# Patient Record
Sex: FEMALE | Race: BLACK OR AFRICAN AMERICAN | NOT HISPANIC OR LATINO | URBAN - METROPOLITAN AREA
[De-identification: names, ages, dates, MRNs, and addresses within clinical notes are randomized per-mention and may not be internally consistent; named-entity substitution may affect disease eponyms.]

---

## 2024-04-23 ENCOUNTER — INPATIENT (INPATIENT)
Facility: HOSPITAL | Age: 89
LOS: 8 days | Discharge: HOSPICE | End: 2024-05-02
Attending: STUDENT IN AN ORGANIZED HEALTH CARE EDUCATION/TRAINING PROGRAM | Admitting: STUDENT IN AN ORGANIZED HEALTH CARE EDUCATION/TRAINING PROGRAM
Payer: MEDICARE

## 2024-04-23 VITALS
SYSTOLIC BLOOD PRESSURE: 156 MMHG | TEMPERATURE: 98 F | WEIGHT: 147.05 LBS | OXYGEN SATURATION: 97 % | HEART RATE: 76 BPM | RESPIRATION RATE: 20 BRPM | DIASTOLIC BLOOD PRESSURE: 77 MMHG

## 2024-04-23 DIAGNOSIS — C25.9 MALIGNANT NEOPLASM OF PANCREAS, UNSPECIFIED: ICD-10-CM

## 2024-04-23 LAB
ADD ON TEST-SPECIMEN IN LAB: SIGNIFICANT CHANGE UP
ADD ON TEST-SPECIMEN IN LAB: SIGNIFICANT CHANGE UP
ALBUMIN SERPL ELPH-MCNC: 3.5 G/DL — SIGNIFICANT CHANGE UP (ref 3.3–5)
ALP SERPL-CCNC: 363 U/L — HIGH (ref 40–120)
ALT FLD-CCNC: 32 U/L — SIGNIFICANT CHANGE UP (ref 4–33)
ANION GAP SERPL CALC-SCNC: 14 MMOL/L — SIGNIFICANT CHANGE UP (ref 7–14)
AST SERPL-CCNC: 54 U/L — HIGH (ref 4–32)
BASE EXCESS BLDV CALC-SCNC: 5.8 MMOL/L — HIGH (ref -2–3)
BASOPHILS # BLD AUTO: 0.06 K/UL — SIGNIFICANT CHANGE UP (ref 0–0.2)
BASOPHILS NFR BLD AUTO: 1.1 % — SIGNIFICANT CHANGE UP (ref 0–2)
BILIRUB SERPL-MCNC: 21.1 MG/DL — HIGH (ref 0.2–1.2)
BLOOD GAS VENOUS COMPREHENSIVE RESULT: SIGNIFICANT CHANGE UP
BUN SERPL-MCNC: 12 MG/DL — SIGNIFICANT CHANGE UP (ref 7–23)
CALCIUM SERPL-MCNC: 9.2 MG/DL — SIGNIFICANT CHANGE UP (ref 8.4–10.5)
CHLORIDE BLDV-SCNC: 92 MMOL/L — LOW (ref 96–108)
CHLORIDE SERPL-SCNC: 89 MMOL/L — LOW (ref 98–107)
CO2 BLDV-SCNC: 33.2 MMOL/L — HIGH (ref 22–26)
CO2 SERPL-SCNC: 26 MMOL/L — SIGNIFICANT CHANGE UP (ref 22–31)
CREAT SERPL-MCNC: 0.47 MG/DL — LOW (ref 0.5–1.3)
EGFR: 89 ML/MIN/1.73M2 — SIGNIFICANT CHANGE UP
EOSINOPHIL # BLD AUTO: 0.09 K/UL — SIGNIFICANT CHANGE UP (ref 0–0.5)
EOSINOPHIL NFR BLD AUTO: 1.6 % — SIGNIFICANT CHANGE UP (ref 0–6)
GAS PNL BLDV: 127 MMOL/L — LOW (ref 136–145)
GLUCOSE BLDV-MCNC: 191 MG/DL — HIGH (ref 70–99)
GLUCOSE SERPL-MCNC: 187 MG/DL — HIGH (ref 70–99)
HCO3 BLDV-SCNC: 32 MMOL/L — HIGH (ref 22–29)
HCT VFR BLD CALC: 27.9 % — LOW (ref 34.5–45)
HCT VFR BLDA CALC: 32 % — LOW (ref 34.5–46.5)
HGB BLD CALC-MCNC: 10.8 G/DL — LOW (ref 11.7–16.1)
HGB BLD-MCNC: 10.2 G/DL — LOW (ref 11.5–15.5)
IANC: 3.12 K/UL — SIGNIFICANT CHANGE UP (ref 1.8–7.4)
IMM GRANULOCYTES NFR BLD AUTO: 0.5 % — SIGNIFICANT CHANGE UP (ref 0–0.9)
LACTATE BLDV-MCNC: 0.8 MMOL/L — SIGNIFICANT CHANGE UP (ref 0.5–2)
LIDOCAIN IGE QN: 371 U/L — HIGH (ref 7–60)
LYMPHOCYTES # BLD AUTO: 1.67 K/UL — SIGNIFICANT CHANGE UP (ref 1–3.3)
LYMPHOCYTES # BLD AUTO: 30.1 % — SIGNIFICANT CHANGE UP (ref 13–44)
MCHC RBC-ENTMCNC: 29.1 PG — SIGNIFICANT CHANGE UP (ref 27–34)
MCHC RBC-ENTMCNC: 36.6 GM/DL — HIGH (ref 32–36)
MCV RBC AUTO: 79.5 FL — LOW (ref 80–100)
MONOCYTES # BLD AUTO: 0.58 K/UL — SIGNIFICANT CHANGE UP (ref 0–0.9)
MONOCYTES NFR BLD AUTO: 10.5 % — SIGNIFICANT CHANGE UP (ref 2–14)
NEUTROPHILS # BLD AUTO: 3.12 K/UL — SIGNIFICANT CHANGE UP (ref 1.8–7.4)
NEUTROPHILS NFR BLD AUTO: 56.2 % — SIGNIFICANT CHANGE UP (ref 43–77)
NRBC # BLD: 0 /100 WBCS — SIGNIFICANT CHANGE UP (ref 0–0)
NRBC # FLD: 0 K/UL — SIGNIFICANT CHANGE UP (ref 0–0)
PCO2 BLDV: 51 MMHG — SIGNIFICANT CHANGE UP (ref 39–52)
PH BLDV: 7.4 — SIGNIFICANT CHANGE UP (ref 7.32–7.43)
PLATELET # BLD AUTO: 275 K/UL — SIGNIFICANT CHANGE UP (ref 150–400)
PO2 BLDV: 24 MMHG — LOW (ref 25–45)
POTASSIUM BLDV-SCNC: 3.3 MMOL/L — LOW (ref 3.5–5.1)
POTASSIUM SERPL-MCNC: 3.2 MMOL/L — LOW (ref 3.5–5.3)
POTASSIUM SERPL-SCNC: 3.2 MMOL/L — LOW (ref 3.5–5.3)
PROT SERPL-MCNC: 7.7 G/DL — SIGNIFICANT CHANGE UP (ref 6–8.3)
RBC # BLD: 3.51 M/UL — LOW (ref 3.8–5.2)
RBC # FLD: 17.5 % — HIGH (ref 10.3–14.5)
SAO2 % BLDV: 27 % — LOW (ref 67–88)
SODIUM SERPL-SCNC: 129 MMOL/L — LOW (ref 135–145)
WBC # BLD: 5.55 K/UL — SIGNIFICANT CHANGE UP (ref 3.8–10.5)
WBC # FLD AUTO: 5.55 K/UL — SIGNIFICANT CHANGE UP (ref 3.8–10.5)

## 2024-04-23 PROCEDURE — 99498 ADVNCD CARE PLAN ADDL 30 MIN: CPT | Mod: 25

## 2024-04-23 PROCEDURE — 99223 1ST HOSP IP/OBS HIGH 75: CPT

## 2024-04-23 PROCEDURE — 99497 ADVNCD CARE PLAN 30 MIN: CPT | Mod: 25

## 2024-04-23 PROCEDURE — 99285 EMERGENCY DEPT VISIT HI MDM: CPT

## 2024-04-23 RX ORDER — LANOLIN ALCOHOL/MO/W.PET/CERES
3 CREAM (GRAM) TOPICAL AT BEDTIME
Refills: 0 | Status: DISCONTINUED | OUTPATIENT
Start: 2024-04-23 | End: 2024-05-02

## 2024-04-23 RX ORDER — DIPHENHYDRAMINE HCL 50 MG
25 CAPSULE ORAL ONCE
Refills: 0 | Status: COMPLETED | OUTPATIENT
Start: 2024-04-23 | End: 2024-04-23

## 2024-04-23 RX ORDER — POTASSIUM CHLORIDE 20 MEQ
40 PACKET (EA) ORAL ONCE
Refills: 0 | Status: COMPLETED | OUTPATIENT
Start: 2024-04-23 | End: 2024-04-23

## 2024-04-23 RX ORDER — SODIUM CHLORIDE 9 MG/ML
500 INJECTION INTRAMUSCULAR; INTRAVENOUS; SUBCUTANEOUS ONCE
Refills: 0 | Status: COMPLETED | OUTPATIENT
Start: 2024-04-23 | End: 2024-04-23

## 2024-04-23 RX ADMIN — SODIUM CHLORIDE 500 MILLILITER(S): 9 INJECTION INTRAMUSCULAR; INTRAVENOUS; SUBCUTANEOUS at 15:56

## 2024-04-23 RX ADMIN — Medication 40 MILLIEQUIVALENT(S): at 15:57

## 2024-04-23 RX ADMIN — Medication 25 MILLIGRAM(S): at 14:18

## 2024-04-23 NOTE — H&P ADULT - PROBLEM SELECTOR PLAN 5
K 3.2 on admission as well as mild hyponatremia on admission, likely iso poor PO intake given poor appetite and weight loss  - cont to trend electrolytes and replete PRN

## 2024-04-23 NOTE — ED PROVIDER NOTE - CLINICAL SUMMARY MEDICAL DECISION MAKING FREE TEXT BOX
[place]on, PGY3 -  89-year-old woman presenting with painless jaundice, abdomen soft and nontender, no nausea no vomiting no diarrhea.  No gallbladder surgeries or diagnoses in the past.  Consider possible pancreatic cancer versus obstructing mass versus hepatitis, labs, hepatitis panel, CT, reassess.   History and exam not consistent with cholecystitis versus choledocholithiasis versus pancreatitis. disposition pending based on workup results. *The above represents an initial assessment/impression. Please refer to progress notes for potential changes in patient clinical course*

## 2024-04-23 NOTE — H&P ADULT - PROBLEM SELECTOR PLAN 2
Presents with pruritis, jaundice, and elevated Bilirubin, Alk Phos, and AST, likely in setting of obstruction/compression by pancreatic mass  - CT imaging findings as above  - R factor 0.3, cholestatic  - No leukocytosis, no abdominal pain nor tenderness, no fevers/chills lately, no diarrhea nor signs/symptoms of possible colitis mentioned on CT  - sequelae of chronic cholecystitis likely iso above CT findings  - f/u hepatitis panel in ED  - f/u GI recs regarding possible stent

## 2024-04-23 NOTE — ED ADULT NURSE NOTE - OBJECTIVE STATEMENT
Pt received to room 23. Pt is a 89 year old female, A&Ox3, Hx of HTN, HLD. Pt presents to ED c/o jaundice. also endorses itchiness. abdomen soft, nontender, nondistended. denies chest pain, SOB, headache, dizziness, abdominal pain, n/v/d, urinary symptoms, fevers/chills, numbness/tingling. breathing is even and unlabored. VS as noted. skin is intact. right AC 20G IV placed, +blood return, flushes without difficulty. labs collected and sent. medications administered as ordered. awaiting imaging. stretcher set in lowest position, call bell within reach, safety maintained. Pt received to room 23. Pt is a 89 year old female, A&Ox3, Hx of HTN, HLD. Pt presents to ED c/o jaundice. also endorses itchiness. abdomen soft, nontender, nondistended. denies chest pain, SOB, headache, dizziness, abdominal pain, n/v/d, urinary symptoms, fevers/chills, numbness/tingling. breathing is even and unlabored. VS as noted. skin is jaundiced. right AC 20G IV placed, +blood return, flushes without difficulty. labs collected and sent. medications administered as ordered. awaiting imaging. stretcher set in lowest position, call bell within reach, safety maintained.

## 2024-04-23 NOTE — H&P ADULT - PROBLEM SELECTOR PLAN 8
DVT ppx: Lovenox  Diet: DASH/TLC DVT ppx: Lovenox  Diet: DASH/TLC, no coffee/fish  PT c/s    GOC: DNR/intubate per discussion overnight (unclear if time of night affected discussion). Was DNR/DNI per family members. Would benefit from re-addressing in the morning regarding code status and updating order and MOLST if changed.

## 2024-04-23 NOTE — H&P ADULT - HISTORY OF PRESENT ILLNESS
Patient is a 89 year-old female with history of HTN, HLD, and peripheral vascular disease presenting to the ED for elevated LFTs over the past 3 weeks with symptoms of painless jaundice and itchiness. Patient recently returned this morning from [facility] after spending around a year outside the country. Prior to her return, she went to a doctor due to general malaise, itchiness, and just overall generally "feeling sick". She was found to have elevated LFTs and bilirubin on those labs (unclear on exact values). She then also noticed jaundice as well as itchiness. Friend at bedside reports that patient appears to have lost weight compared to before. She otherwise denies fevers/chills, headaches, chest pain, difficulty breathing, abdominal pain, constipation/diarrhea, dysuria, recent sick contacts.     In the ED, patient vitals T 98.5F, HR 88, /91, and O2 saturation of 99% on RA with RR 18. Labs notable for elevated lipase, elevated Tbili, elevated Alk Phos and AST, and mild electrolyte abnormalities. CXR with clear lungs. CT abd/pelvis with main findings of biliary dilatation as well as pancreatic mass. Received Benadryl, potassium supplementation, and 500cc NS bolus in the ED.  Patient is a 89 year-old female with history of HTN, HLD, and peripheral vascular disease presenting to the ED for elevated LFTs over the past 3 weeks with symptoms of painless jaundice and itchiness. Patient recently returned this morning from [facility] after spending around a year outside the country. Prior to her return, she went to a doctor due to general malaise, itchiness, and just overall generally "feeling sick". She was found to have elevated LFTs and bilirubin on those labs (unclear on exact values). She then also noticed jaundice as well as itchiness. Friend at bedside reports that patient appears to have lost weight compared to before and patient states she just doesn't feel like eating as much. She otherwise denies fevers/chills, headaches, chest pain, difficulty breathing, abdominal pain, constipation/diarrhea, dysuria, recent sick contacts.     In the ED, patient vitals T 98.5F, HR 88, /91, and O2 saturation of 99% on RA with RR 18. Labs notable for elevated lipase, elevated Tbili, elevated Alk Phos and AST, and mild electrolyte abnormalities. CXR with clear lungs. CT abd/pelvis with main findings of biliary dilatation as well as pancreatic mass. Received Benadryl, potassium supplementation, and 500cc NS bolus in the ED.  Patient is a 89 year-old female with history of HTN, HLD, [place], and peripheral vascular disease presenting to the ED for elevated LFTs over the past 3 weeks with symptoms of painless jaundice and itchiness. Patient recently returned this morning from [facility] after spending around a year outside the country. Prior to her return, she went to a doctor due to general malaise, itchiness, and just overall generally "feeling sick". She was found to have elevated LFTs and bilirubin on those labs (unclear on exact values). She then also noticed jaundice as well as itchiness. Friend at bedside reports that patient appears to have lost weight compared to before and patient states she just doesn't feel like eating as much. She otherwise denies fevers/chills, headaches, chest pain, difficulty breathing, abdominal pain, constipation/diarrhea, dysuria, recent sick contacts.     In the ED, patient vitals T 98.5F, HR 88, /91, and O2 saturation of 99% on RA with RR 18. Labs notable for elevated lipase, elevated Tbili, elevated Alk Phos and AST, and mild electrolyte abnormalities. CXR with clear lungs. CT abd/pelvis with main findings of biliary dilatation as well as pancreatic mass. Received Benadryl, potassium supplementation, and 500cc NS bolus in the ED.

## 2024-04-23 NOTE — ED ADULT TRIAGE NOTE - CHIEF COMPLAINT QUOTE
Patient recently traveled from [facility] last night and was told her liver enzymes were elevated, presents to ED with complains of jaundice of eyes and generalized itchiness. Patient denies any CP or SOB, patient able to speak in clear sentences, respirations equal and unlabored on room air.  phx: HTN, HLD

## 2024-04-23 NOTE — H&P ADULT - PROBLEM SELECTOR PLAN 1
Presents with generalized malaise, weight loss, jaundice and itchiness. Found to have pancreatic  mass on CT imaging with concern for adenocarcinoma  - CT imaging with 1.4 cm mass within uncinate process of pancreas w/ marked pancreatic and biliary ductal dilatation, suspicious for adenocarcinoma. Abuts portosplenic confluence but otherwise no vascular involvement identified. Further evaluation with endoscopic US is recommended. No evidence of metastatic disease  - also mass-like nonspecific fullness of pancreatic head  - patient denies pulmonary symptoms regarding chest involvement  - lipase elevated 371 however patient without significant pain currently. Although CT does show pancreatic nonspecific findings, will be conservative with fluids in setting of extensively calcified aortic valve as well as systolic murmur on exam and patient's lack of symptoms. Unclear if representing pancreatitis/mass on CT.  - GOC initiated with patient and family, amenable to symptom-relief and possible biopsy, family to discuss further in AM  - GI emailed for EUS and possible biopsy, f/u recs Presents with generalized malaise, weight loss, jaundice and itchiness. Found to have pancreatic  mass on CT imaging with concern for adenocarcinoma  - CT imaging with 1.4 cm mass within uncinate process of pancreas w/ marked pancreatic and biliary ductal dilatation, suspicious for adenocarcinoma. Abuts portosplenic confluence but otherwise no vascular involvement identified. Further evaluation with endoscopic US is recommended. No evidence of metastatic disease  - also mass-like nonspecific fullness of pancreatic head  - patient denies pulmonary symptoms regarding chest involvement  - lipase elevated 371 however patient without significant pain currently. Although CT does show pancreatic nonspecific findings, will be conservative with fluids in setting of extensively calcified aortic valve as well as systolic murmur on exam and patient's current lack of abdominal symptoms. Unclear if representing pancreatitis/mass on CT.  - GOC initiated with patient and family, amenable to symptom-relief and possible biopsy, family to discuss further in AM  - GI emailed for EUS and possible biopsy, f/u recs

## 2024-04-23 NOTE — H&P ADULT - NSHPPHYSICALEXAM_GEN_ALL_CORE
VITALS:   T(C): 36.6 (04-24-24 @ 01:35), Max: 36.9 (04-23-24 @ 13:50)  HR: 61 (04-24-24 @ 01:35) (59 - 88)  BP: 148/68 (04-24-24 @ 01:35) (129/73 - 171/85)  RR: 17 (04-24-24 @ 01:35) (17 - 20)  SpO2: 100% (04-24-24 @ 01:35) (97% - 100%)    GENERAL: no acute distress, frail  EYES: EOMI, PERRLA, +scleral icterus  ENMT: +dry oral mucosa  NECK: supple, no palpable masses  RESPIRATORY: lungs clear to ascultation b/l, unlabored respirations  CARDIOVASCULAR: regular rate and rhythm, +systolic murmur  ABDOMEN: soft, normal bowel sounds, nondistended, nontender to palpation  EXTREMITIES: no joint swelling or tenderness to palpation  PSYCH: affect appropriate  NEUROLOGY: AAOx4 ("[place]", "hospital", "2024", "itching and liver tests"), CNs grossly intact  SKIN: no palpable lesions

## 2024-04-23 NOTE — H&P ADULT - PROBLEM SELECTOR PLAN 3
Likely iso cholestasis given above. No rashes or lesions visualized.   - can cont with antihistamine to assess if improvement  - trial Cholestyramine BID

## 2024-04-23 NOTE — ED ADULT NURSE NOTE - NSFALLHARMRISKINTERV_ED_ALL_ED

## 2024-04-23 NOTE — H&P ADULT - PROBLEM SELECTOR PLAN 4
UA with WBC and many bacteria. Endorses burning with urination  - CT abd/pelvis with findings of mild thickening of the bladder base with prominence of the proximal urethra  - Ceftriaxone for simple cystitis  - f/u UCx, tailor antibiotics

## 2024-04-23 NOTE — ED PROVIDER NOTE - ATTENDING CONTRIBUTION TO CARE
DR. FOSS, ATTENDING MD-  I performed a face to face bedside interview with the patient regarding history of present illness, review of symptoms and past medical history. I completed an independent physical exam.  I have discussed the patient's plan of care with the resident.   Documentation as above in the note.    89 y/o female with jaundice, pruritis over past three weeks.  Feels generally fatigued.  No abd pain.  Concern for intra-abd mass with resultant cholestasis.  Obtain cbc cmp lipase ct a/p give ivf bolus antihistamine likely admission.

## 2024-04-23 NOTE — H&P ADULT - NSHPREVIEWOFSYSTEMS_GEN_ALL_CORE
REVIEW OF SYSTEMS:    CONSTITUTIONAL: No fevers. No chills. +Malaise, +Weight loss, +Pruritis.  HEENT: No change in vision. No change in hearing. No sore throat.  CARDIOVASCULAR: No chest pain. No palpitations  RESPIRATORY: No shortness of breath. No cough.  GASTROINTESTINAL: No nausea. No vomiting. No abdominal pain. No change in bowel movements.  GENITOURINARY:  No hematuria. No dysuria. No change in urination.   EXTREMITIES/SKIN: No rashes. No itching. No swelling.  MUSCULOSKELETAL: No muscle aches. No stiffness.  PSYCHIATRIC: No history of depression or anxiety.  NEUROLOGICAL: No headaches, dizziness. No change in bowel or bladder control.  ENDOCRINOLOGIC: No reports of cold or heat intolerance. No polydipsia.  ALLERGIES: +Allergy to fish and coffee (added to system)

## 2024-04-23 NOTE — ED PROVIDER NOTE - NS ED ROS FT
GENERAL: No fever, no chills  EYES: No change in vision  HEENT: No trouble swallowing or speaking  CARDIAC: No chest pain  PULMONARY: No cough, no SOB  GI: No abdominal pain, no nausea, no vomiting, no diarrhea, no constipation  : No changes in urination  SKIN: No rashes, +jaundice   NEURO: No headache, no numbness  MSK: No joint pain  Otherwise as HPI or negative.

## 2024-04-23 NOTE — ED PROVIDER NOTE - OBJECTIVE STATEMENT
89-year-old woman with PMH HTN, HLD, presenting due to elevated LFTs over the last 3 weeks, painless jaundice and itchiness.  [place] and was unable to continue follow-up with the primary care doctor that initially told her about her elevated LFTs.  Denies fevers, chills, nausea, vomiting, chest pain, SOB, abdominal pain, diarrhea.  Denies history of hepatitis or gallbladder issues.  Only surgical history is appendicitis and fibroid removals.

## 2024-04-23 NOTE — H&P ADULT - NSHPLABSRESULTS_GEN_ALL_CORE
LABS:                          10.2   5.55  )-----------( 275      ( 2024 14:16 )             27.9         129<L>  |  89<L>  |  12  ----------------------------<  187<H>  3.2<L>   |  26  |  0.47<L>    Ca    9.2      2024 14:16  Mg     1.70         TPro  7.7  /  Alb  3.5  /  TBili  21.1<H>  /  DBili  16.0<H>  /  AST  54<H>  /  ALT  32  /  AlkPhos  363<H>      LIVER FUNCTIONS - ( 2024 14:16 )  Alb: 3.5 g/dL / Pro: 7.7 g/dL / ALK PHOS: 363 U/L / ALT: 32 U/L / AST: 54 U/L / GGT: x             Urinalysis Basic - ( 2024 01:03 )    Color: Dark Yellow / Appearance: Cloudy / S.023 / pH: x  Gluc: x / Ketone: Negative mg/dL  / Bili: Moderate / Urobili: 0.2 mg/dL   Blood: x / Protein: Negative mg/dL / Nitrite: Negative   Leuk Esterase: Small / RBC: 6 /HPF / WBC 14 /HPF   Sq Epi: x / Non Sq Epi: 0 /HPF / Bacteria: Many /HPF    Lipase 371    VBG pH 7.40 / pCO2 51 / pO2 24 / HCO3 32 / 27.0%  Lactate 0.8      IMAGING:      < from: Xray Chest 1 View- PORTABLE-Urgent (24 @ 14:52) >  FINDINGS:  The heart is not accurately assessed in this AP projection.  The lungs are clear.  There is no pleural effusion.  There is no pneumothorax.  No acute bony abnormality.  IMPRESSION:  Clear lungs.  --- End of Report ---      < from: CT Abdomen and Pelvis w/ IV Cont (24 @ 17:36) >    IMPRESSION:  There is a very subtle approximately 1.4 cm mass within the uncinate   process of the pancreas with marked pancreatic and biliary ductal   dilatation, suspicious for adenocarcinoma. There is approximately 90   degrees abutment of the portosplenic confluence. Otherwise no significant   vascular involvement identified. Further evaluation with endoscopic   ultrasound is recommended.    There is also masslike fullness of the pancreatic head, which is   nonspecific and may simply represent normal residual pancreatic   parenchyma, there also appears to be focal wall thickening of the   adjacent duodenum. This could also be assessed with upper   endoscopy/endoscopic ultrasound recommended above.    No evidence of metastatic disease within the abdomen or pelvis.    Cholelithiasis.    There is extensive diverticulosis of the entirety of the large bowel and   a long segment of wall thickening of the right hemicolon, which could be   due to underdistention or an underlying colitis in the appropriate   clinical setting.    Extensive aortic valve leaflet calcifications are noted. Correlation for   aortic stenosis is suggested.    --- End of Report ---  < end of copied text >

## 2024-04-23 NOTE — H&P ADULT - CONVERSATION DETAILS
Discussed with patient and friend, Galina Waterman, at bedside regarding diagnosis and treatment options. Patient has capacity and understanding for findings and decisions. Explained findings regarding pancreatic mass and biliary ductal dilatation likely due to compression/obstruction from the mass and proposed next steps of EUS with possible biopsy or stent placement or both. Also discussed the risks of treatment regarding surgery or chemotherapy at her age and whether patient would prefer diagnosis and treatment or more of a palliative approach. Patient is not entirely sure on her decision at this stage, but believes she would want at least a biopsy and symptom relief.    Asked patient regarding resuscitation and code status. Patient replies that she is currently the longest-living person in her family and has had a good life. She jokes that "I'm 89. If my heart stopped, what would you be bringing back?" She has discussed this with her family before and would not resuscitation with chest compressions or CPR. However, when asked about intubation, she replies that intubation would be okay. When asked about proxies, she states that her son is her proxy and her niece is secondary decision-maker.    Called son, Ganesh, to update and confirm. Son confirms that he is the healthcare proxy and also states that DNR/DNI would be consistent with her past discussions. Expressed for son to find and bring in his healthcare proxy form.     Also called patient's niece at request to update and confirm. Niece confirms that patient would likely want aggressive interventions, and is not sure if patient would even want a biopsy. Both niece and son to visit tomorrow morning and will discuss with patient.     MOLST form filled out for tonight, will need updating tomorrow after HCP and further discussion. Discussed with patient and friend, Galina Waterman, at bedside regarding diagnosis and treatment options. Patient has capacity and understanding for findings and decisions. Explained findings regarding pancreatic mass and biliary ductal dilatation likely due to compression/obstruction from the mass and proposed next steps of EUS with possible biopsy or stent placement or both. Also discussed the risks of treatment regarding surgery or chemotherapy at her age and whether patient would prefer diagnosis and treatment or more of a palliative approach. Patient is not entirely sure on her decision at this stage, but believes she would want at least a biopsy and symptom relief.    Asked patient regarding resuscitation and code status. Patient replies that she is currently the longest-living person in her family and has had a good life. She jokes that "I'm 89. If my heart stopped, what would you be bringing back?" She has discussed this with her family before and would not resuscitation with chest compressions or CPR. However, when asked about intubation, she replies that intubation would be okay. When asked about proxies, she states that her son is her proxy and her niece is secondary decision-maker.    Called son, Ganesh, to update and confirm. Son confirms that he is the healthcare proxy and also states that DNR/DNI would be consistent with her past discussions. Expressed for son to find and bring in his healthcare proxy form.     Also called patient's niece at request to update and confirm. Niece confirms that patient would likely not want aggressive interventions such as surgery. Both niece and son to visit tomorrow morning and will discuss with patient.     MOLST form filled out for tonight, will need updating tomorrow after HCP and further discussion. Discussed with patient and friend, Galina Waterman, at bedside regarding diagnosis and treatment options. Patient has capacity and understanding for findings and decisions. Explained findings regarding pancreatic mass and biliary ductal dilatation likely due to compression/obstruction from the mass and proposed next steps of EUS with possible biopsy or stent placement or both. Also discussed the risks of treatment regarding surgery or chemotherapy at her age and whether patient would prefer diagnosis and treatment or more of a palliative approach. Patient is not entirely sure on her decision at this stage, but believes she would want at least a biopsy and symptom relief.    Asked patient regarding resuscitation and code status. Patient replies that she is currently the longest-living person in her family and has had a good life. She jokes that "I'm 89. If my heart stopped, what would you be bringing back?" She has discussed this with her family before and would not resuscitation with chest compressions or CPR. However, when asked about intubation, she replies that intubation would be okay. When asked about proxies, she states that her son is her proxy and her niece is secondary decision-maker.    Called son, Ganesh, to update and confirm. Son confirms that he is the healthcare proxy and also states that DNR/DNI would be consistent with her past discussions. Expressed for son to find and bring in his healthcare proxy form.     Also called patient's niece at request to update and confirm. Niece confirms that patient would likely not want aggressive interventions such as surgery. Both niece and son to visit tomorrow morning and will discuss with patient.     MOLST form filled out for tonight, will possibly need updating tomorrow after HCP and further discussion. Discussed with patient and friend, Galina Waterman, at bedside regarding diagnosis and treatment options. Patient has capacity and understanding for findings and decisions. Explained findings regarding pancreatic mass and biliary ductal dilatation likely due to compression/obstruction from the mass and next steps of likely EUS with possible biopsy or stent placement or both. Also discussed the risks of further treatment regarding surgery or chemotherapy at her age and whether patient would prefer diagnosis and treatment or more of a palliative approach. Patient is not entirely sure on her decision at this stage, but believes she would want at least a biopsy and symptom relief.    Asked patient regarding resuscitation and code status. Patient replies that she is currently the longest-living person in her family and has had a good life. She jokes that "I'm 89. If my heart stopped, what would you be bringing back?" She has discussed this with her family before and would not resuscitation with chest compressions or CPR. However, when asked about intubation, she replies that intubation would be okay. When asked about proxies, she states that her son is her proxy and her niece is secondary decision-maker.    Called son, Ganesh, to update and confirm. Son confirms that he is the healthcare proxy and also states that DNR/DNI would be consistent with her past discussions. Expressed for son to find and bring in his healthcare proxy form.     Also called patient's niece at pt's request to update and confirm. Niece confirms that patient would likely not want aggressive interventions such as surgery. Both niece and son to visit tomorrow morning and will discuss with patient.     MOLST form filled out for tonight, will possibly need updating tomorrow after HCP arrives and further discussion.

## 2024-04-23 NOTE — H&P ADULT - ASSESSMENT
89F with PMH of HTN, HLD, and peripheral vascular disease presenting to the ED for elevated LFTs over the past 3 weeks with symptoms of painless jaundice and itchiness, found to have elevated Bilirubin and Alk Phos with findings of CBD dilatation iso pancreatic mass 89F with PMH of HTN, HLD, and peripheral vascular disease presenting to the ED for elevated LFTs over the past 3 weeks with symptoms of painless jaundice and itchiness, found to have elevated Bilirubin and Alk Phos with CT findings of CBD dilatation iso pancreatic mass, admitted for further work-up and management 89F with PMH of HTN, HLD, [place], and peripheral vascular disease presenting to the ED for elevated LFTs over the past 3 weeks with symptoms of painless jaundice and itchiness, found to have elevated Bilirubin and Alk Phos with CT findings of CBD dilatation iso pancreatic mass, admitted for further work-up and management

## 2024-04-23 NOTE — H&P ADULT - NSICDXPASTSURGICALHX_GEN_ALL_CORE_FT
SNF Follow-up Note    Skilled Nursing Facility Discharge Assessment 6/13/2019   Acute Facility Discharged From [facility]   Acute Discharge Date 5/29/2019   Name of the Skilled [facility] Facility? [facility]   Estimated length of stay for the patient? No d/c date set at this time   Who is the insurance provider or payor of patient stay? Medicare   Progression of Patient? Medicare status but potential for LTC placement.       Dania Hughes RN    6/13/2019, 3:20 PM   PAST SURGICAL HISTORY:  History of myomectomy

## 2024-04-23 NOTE — H&P ADULT - NSICDXPASTMEDICALHX_GEN_ALL_CORE_FT
PAST MEDICAL HISTORY:  HLD (hyperlipidemia)     HTN (hypertension)     Peripheral vascular disease

## 2024-04-23 NOTE — ED ADULT NURSE NOTE - CHIEF COMPLAINT QUOTE
Patient recently traveled from [facility] last night and was told her liver enzymes were elevated, presents to ED with complains of jaundice of eyes and generalized itchiness. Patient denies any CP or SOB, patient able to speak in clear sentences, respirations equal and unlabored on room air.  phx: HTN, HLD

## 2024-04-23 NOTE — ED ADULT NURSE REASSESSMENT NOTE - NS ED NURSE REASSESS COMMENT FT1
Report received from break RENARD Lezama. Pt is A&Ox3, appears comfortable in stretcher, offers no new complaints at this time. breathing is even and unlabored. Denies pain or SOB. NSR on cardiac monitor. Plan of care ongoing. Stretcher set in lowest position, call bell within reach, safety maintained.

## 2024-04-23 NOTE — ED ADULT NURSE NOTE - SUICIDE SCREENING DEPRESSION
Negative Aklief Pregnancy And Lactation Text: It is unknown if this medication is safe to use during pregnancy.  It is unknown if this medication is excreted in breast milk.  Breastfeeding women should use the topical cream on the smallest area of the skin for the shortest time needed while breastfeeding.  Do not apply to nipple and areola.

## 2024-04-23 NOTE — ED PROVIDER NOTE - PROGRESS NOTE DETAILS
Isaiah PGY1: received patient at signout.  painless jaundice for 3 weeks; bilib>20 here, hypokalemia repleted; ct abd/pel with pancreatic mass concerning for adenocarcinoma. Isaiah PGY1: patient and family updated on findings of pancreatic mass concerning for adenocarcinoma.  discussed plans for admission for further evaluation and management.  patient and family understand and agree.

## 2024-04-24 DIAGNOSIS — Z98.890 OTHER SPECIFIED POSTPROCEDURAL STATES: Chronic | ICD-10-CM

## 2024-04-24 DIAGNOSIS — L29.9 PRURITUS, UNSPECIFIED: ICD-10-CM

## 2024-04-24 DIAGNOSIS — I10 ESSENTIAL (PRIMARY) HYPERTENSION: ICD-10-CM

## 2024-04-24 DIAGNOSIS — Z29.9 ENCOUNTER FOR PROPHYLACTIC MEASURES, UNSPECIFIED: ICD-10-CM

## 2024-04-24 DIAGNOSIS — E87.6 HYPOKALEMIA: ICD-10-CM

## 2024-04-24 DIAGNOSIS — N39.0 URINARY TRACT INFECTION, SITE NOT SPECIFIED: ICD-10-CM

## 2024-04-24 DIAGNOSIS — I35.0 NONRHEUMATIC AORTIC (VALVE) STENOSIS: ICD-10-CM

## 2024-04-24 DIAGNOSIS — K86.89 OTHER SPECIFIED DISEASES OF PANCREAS: ICD-10-CM

## 2024-04-24 DIAGNOSIS — Z51.5 ENCOUNTER FOR PALLIATIVE CARE: ICD-10-CM

## 2024-04-24 DIAGNOSIS — Z71.89 OTHER SPECIFIED COUNSELING: ICD-10-CM

## 2024-04-24 DIAGNOSIS — K83.1 OBSTRUCTION OF BILE DUCT: ICD-10-CM

## 2024-04-24 LAB
ADD ON TEST-SPECIMEN IN LAB: SIGNIFICANT CHANGE UP
ADD ON TEST-SPECIMEN IN LAB: SIGNIFICANT CHANGE UP
ALBUMIN SERPL ELPH-MCNC: 3.3 G/DL — SIGNIFICANT CHANGE UP (ref 3.3–5)
ALP SERPL-CCNC: 338 U/L — HIGH (ref 40–120)
ALT FLD-CCNC: 29 U/L — SIGNIFICANT CHANGE UP (ref 4–33)
ANION GAP SERPL CALC-SCNC: 14 MMOL/L — SIGNIFICANT CHANGE UP (ref 7–14)
APPEARANCE UR: ABNORMAL
APTT BLD: 34.5 SEC — SIGNIFICANT CHANGE UP (ref 24.5–35.6)
AST SERPL-CCNC: 53 U/L — HIGH (ref 4–32)
BACTERIA # UR AUTO: ABNORMAL /HPF
BASOPHILS # BLD AUTO: 0.06 K/UL — SIGNIFICANT CHANGE UP (ref 0–0.2)
BASOPHILS NFR BLD AUTO: 1 % — SIGNIFICANT CHANGE UP (ref 0–2)
BILIRUB SERPL-MCNC: 19.3 MG/DL — HIGH (ref 0.2–1.2)
BILIRUB UR-MCNC: ABNORMAL
BUN SERPL-MCNC: 9 MG/DL — SIGNIFICANT CHANGE UP (ref 7–23)
CALCIUM SERPL-MCNC: 8.9 MG/DL — SIGNIFICANT CHANGE UP (ref 8.4–10.5)
CANCER AG19-9 SERPL-ACNC: 217 U/ML — HIGH
CAST: 0 /LPF — SIGNIFICANT CHANGE UP (ref 0–4)
CHLORIDE SERPL-SCNC: 93 MMOL/L — LOW (ref 98–107)
CO2 SERPL-SCNC: 20 MMOL/L — LOW (ref 22–31)
COLOR SPEC: SIGNIFICANT CHANGE UP
CREAT SERPL-MCNC: 0.35 MG/DL — LOW (ref 0.5–1.3)
DIFF PNL FLD: ABNORMAL
EGFR: 96 ML/MIN/1.73M2 — SIGNIFICANT CHANGE UP
EOSINOPHIL # BLD AUTO: 0.11 K/UL — SIGNIFICANT CHANGE UP (ref 0–0.5)
EOSINOPHIL NFR BLD AUTO: 1.8 % — SIGNIFICANT CHANGE UP (ref 0–6)
GLUCOSE SERPL-MCNC: 110 MG/DL — HIGH (ref 70–99)
GLUCOSE UR QL: NEGATIVE MG/DL — SIGNIFICANT CHANGE UP
HAV IGM SER-ACNC: SIGNIFICANT CHANGE UP
HBV CORE IGM SER-ACNC: SIGNIFICANT CHANGE UP
HBV SURFACE AG SER-ACNC: SIGNIFICANT CHANGE UP
HCT VFR BLD CALC: 28.1 % — LOW (ref 34.5–45)
HCV AB S/CO SERPL IA: 0.14 S/CO — SIGNIFICANT CHANGE UP (ref 0–0.99)
HCV AB SERPL-IMP: SIGNIFICANT CHANGE UP
HGB BLD-MCNC: 10.4 G/DL — LOW (ref 11.5–15.5)
IANC: 3.82 K/UL — SIGNIFICANT CHANGE UP (ref 1.8–7.4)
IMM GRANULOCYTES NFR BLD AUTO: 0.3 % — SIGNIFICANT CHANGE UP (ref 0–0.9)
INR BLD: 1.33 RATIO — HIGH (ref 0.85–1.18)
KETONES UR-MCNC: NEGATIVE MG/DL — SIGNIFICANT CHANGE UP
LEUKOCYTE ESTERASE UR-ACNC: ABNORMAL
LYMPHOCYTES # BLD AUTO: 1.66 K/UL — SIGNIFICANT CHANGE UP (ref 1–3.3)
LYMPHOCYTES # BLD AUTO: 26.8 % — SIGNIFICANT CHANGE UP (ref 13–44)
MAGNESIUM SERPL-MCNC: 1.7 MG/DL — SIGNIFICANT CHANGE UP (ref 1.6–2.6)
MCHC RBC-ENTMCNC: 29.8 PG — SIGNIFICANT CHANGE UP (ref 27–34)
MCHC RBC-ENTMCNC: 37 GM/DL — HIGH (ref 32–36)
MCV RBC AUTO: 80.5 FL — SIGNIFICANT CHANGE UP (ref 80–100)
MONOCYTES # BLD AUTO: 0.53 K/UL — SIGNIFICANT CHANGE UP (ref 0–0.9)
MONOCYTES NFR BLD AUTO: 8.5 % — SIGNIFICANT CHANGE UP (ref 2–14)
MRSA PCR RESULT.: SIGNIFICANT CHANGE UP
NEUTROPHILS # BLD AUTO: 3.82 K/UL — SIGNIFICANT CHANGE UP (ref 1.8–7.4)
NEUTROPHILS NFR BLD AUTO: 61.6 % — SIGNIFICANT CHANGE UP (ref 43–77)
NITRITE UR-MCNC: NEGATIVE — SIGNIFICANT CHANGE UP
NRBC # BLD: 0 /100 WBCS — SIGNIFICANT CHANGE UP (ref 0–0)
NRBC # FLD: 0 K/UL — SIGNIFICANT CHANGE UP (ref 0–0)
OSMOLALITY SERPL: 272 MOSM/KG — LOW (ref 275–295)
OSMOLALITY UR: 340 MOSM/KG — SIGNIFICANT CHANGE UP (ref 50–1200)
PH UR: 8 — SIGNIFICANT CHANGE UP (ref 5–8)
PHOSPHATE SERPL-MCNC: 2.8 MG/DL — SIGNIFICANT CHANGE UP (ref 2.5–4.5)
PLATELET # BLD AUTO: 297 K/UL — SIGNIFICANT CHANGE UP (ref 150–400)
POTASSIUM SERPL-MCNC: 3.4 MMOL/L — LOW (ref 3.5–5.3)
POTASSIUM SERPL-SCNC: 3.4 MMOL/L — LOW (ref 3.5–5.3)
PROT SERPL-MCNC: 7.3 G/DL — SIGNIFICANT CHANGE UP (ref 6–8.3)
PROT UR-MCNC: NEGATIVE MG/DL — SIGNIFICANT CHANGE UP
PROTHROM AB SERPL-ACNC: 14.9 SEC — HIGH (ref 9.5–13)
RBC # BLD: 3.49 M/UL — LOW (ref 3.8–5.2)
RBC # FLD: 17.7 % — HIGH (ref 10.3–14.5)
RBC CASTS # UR COMP ASSIST: 6 /HPF — HIGH (ref 0–4)
S AUREUS DNA NOSE QL NAA+PROBE: SIGNIFICANT CHANGE UP
SODIUM SERPL-SCNC: 127 MMOL/L — LOW (ref 135–145)
SP GR SPEC: 1.02 — SIGNIFICANT CHANGE UP (ref 1–1.03)
SQUAMOUS # UR AUTO: 0 /HPF — SIGNIFICANT CHANGE UP (ref 0–5)
UROBILINOGEN FLD QL: 0.2 MG/DL — SIGNIFICANT CHANGE UP (ref 0.2–1)
WBC # BLD: 6.2 K/UL — SIGNIFICANT CHANGE UP (ref 3.8–10.5)
WBC # FLD AUTO: 6.2 K/UL — SIGNIFICANT CHANGE UP (ref 3.8–10.5)
WBC UR QL: 14 /HPF — HIGH (ref 0–5)

## 2024-04-24 PROCEDURE — 71275 CT ANGIOGRAPHY CHEST: CPT | Mod: 26

## 2024-04-24 PROCEDURE — 99497 ADVNCD CARE PLAN 30 MIN: CPT | Mod: 25

## 2024-04-24 PROCEDURE — 99223 1ST HOSP IP/OBS HIGH 75: CPT | Mod: GC

## 2024-04-24 PROCEDURE — 99232 SBSQ HOSP IP/OBS MODERATE 35: CPT | Mod: GC

## 2024-04-24 PROCEDURE — 99223 1ST HOSP IP/OBS HIGH 75: CPT

## 2024-04-24 RX ORDER — POTASSIUM CHLORIDE 20 MEQ
40 PACKET (EA) ORAL ONCE
Refills: 0 | Status: COMPLETED | OUTPATIENT
Start: 2024-04-24 | End: 2024-04-24

## 2024-04-24 RX ORDER — CEFTRIAXONE 500 MG/1
1000 INJECTION, POWDER, FOR SOLUTION INTRAMUSCULAR; INTRAVENOUS EVERY 24 HOURS
Refills: 0 | Status: COMPLETED | OUTPATIENT
Start: 2024-04-24 | End: 2024-04-26

## 2024-04-24 RX ORDER — ACETAMINOPHEN 500 MG
650 TABLET ORAL ONCE
Refills: 0 | Status: COMPLETED | OUTPATIENT
Start: 2024-04-24 | End: 2024-04-24

## 2024-04-24 RX ORDER — CHOLESTYRAMINE 4 G/9G
4 POWDER, FOR SUSPENSION ORAL
Refills: 0 | Status: DISCONTINUED | OUTPATIENT
Start: 2024-04-24 | End: 2024-05-02

## 2024-04-24 RX ORDER — VALSARTAN 80 MG/1
160 TABLET ORAL DAILY
Refills: 0 | Status: DISCONTINUED | OUTPATIENT
Start: 2024-04-24 | End: 2024-04-26

## 2024-04-24 RX ORDER — SODIUM CHLORIDE 9 MG/ML
500 INJECTION INTRAMUSCULAR; INTRAVENOUS; SUBCUTANEOUS
Refills: 0 | Status: DISCONTINUED | OUTPATIENT
Start: 2024-04-24 | End: 2024-04-26

## 2024-04-24 RX ORDER — CHLORHEXIDINE GLUCONATE 213 G/1000ML
1 SOLUTION TOPICAL
Refills: 0 | Status: DISCONTINUED | OUTPATIENT
Start: 2024-04-24 | End: 2024-05-02

## 2024-04-24 RX ORDER — DIPHENHYDRAMINE HCL 50 MG
25 CAPSULE ORAL EVERY 6 HOURS
Refills: 0 | Status: DISCONTINUED | OUTPATIENT
Start: 2024-04-24 | End: 2024-04-24

## 2024-04-24 RX ORDER — CALAMINE AND ZINC OXIDE AND PHENOL 160; 10 MG/ML; MG/ML
1 LOTION TOPICAL DAILY
Refills: 0 | Status: DISCONTINUED | OUTPATIENT
Start: 2024-04-24 | End: 2024-05-02

## 2024-04-24 RX ORDER — ENOXAPARIN SODIUM 100 MG/ML
40 INJECTION SUBCUTANEOUS EVERY 24 HOURS
Refills: 0 | Status: DISCONTINUED | OUTPATIENT
Start: 2024-04-24 | End: 2024-04-26

## 2024-04-24 RX ADMIN — ENOXAPARIN SODIUM 40 MILLIGRAM(S): 100 INJECTION SUBCUTANEOUS at 06:27

## 2024-04-24 RX ADMIN — SODIUM CHLORIDE 75 MILLILITER(S): 9 INJECTION INTRAMUSCULAR; INTRAVENOUS; SUBCUTANEOUS at 04:10

## 2024-04-24 RX ADMIN — CALAMINE AND ZINC OXIDE AND PHENOL 1 APPLICATION(S): 160; 10 LOTION TOPICAL at 11:57

## 2024-04-24 RX ADMIN — CEFTRIAXONE 100 MILLIGRAM(S): 500 INJECTION, POWDER, FOR SOLUTION INTRAMUSCULAR; INTRAVENOUS at 06:41

## 2024-04-24 RX ADMIN — Medication 40 MILLIEQUIVALENT(S): at 10:28

## 2024-04-24 RX ADMIN — VALSARTAN 160 MILLIGRAM(S): 80 TABLET ORAL at 06:26

## 2024-04-24 RX ADMIN — CHOLESTYRAMINE 4 GRAM(S): 4 POWDER, FOR SUSPENSION ORAL at 11:55

## 2024-04-24 RX ADMIN — Medication 650 MILLIGRAM(S): at 06:56

## 2024-04-24 RX ADMIN — CHLORHEXIDINE GLUCONATE 1 APPLICATION(S): 213 SOLUTION TOPICAL at 13:55

## 2024-04-24 RX ADMIN — Medication 650 MILLIGRAM(S): at 06:26

## 2024-04-24 NOTE — PATIENT PROFILE ADULT - ..
24-Apr-2024 02:00:56 Azithromycin Pregnancy And Lactation Text: This medication is considered safe during pregnancy and is also secreted in breast milk.

## 2024-04-24 NOTE — CONSULT NOTE ADULT - SUBJECTIVE AND OBJECTIVE BOX
CC: Patient is a 89y old  Female who presents with a chief complaint of Pancreatic mass, abnormal lab-work (24 Apr 2024 10:20)    HPI:  Patient is a 89 year-old female with history of HTN, HLD, [place], and peripheral vascular disease presenting to the ED for elevated LFTs over the past 3 weeks with symptoms of painless jaundice and itchiness. Patient recently returned this morning from [facility] after spending around a year outside the country. Prior to her return, she went to a doctor due to general malaise, itchiness, and just overall generally "feeling sick". She was found to have elevated LFTs and bilirubin on those labs (unclear on exact values). She then also noticed jaundice as well as itchiness. Friend at bedside reports that patient appears to have lost weight compared to before and patient states she just doesn't feel like eating as much. She otherwise denies fevers/chills, headaches, chest pain, difficulty breathing, abdominal pain, constipation/diarrhea, dysuria, recent sick contacts.     In the ED, patient vitals T 98.5F, HR 88, /91, and O2 saturation of 99% on RA with RR 18. Labs notable for elevated lipase, elevated Tbili, elevated Alk Phos and AST, and mild electrolyte abnormalities. CXR with clear lungs. CT abd/pelvis with main findings of biliary dilatation as well as pancreatic mass. Received Benadryl, potassium supplementation, and 500cc NS bolus in the ED.  (23 Apr 2024 22:50)    PAST MEDICAL & SURGICAL HISTORY:  HTN (hypertension)      HLD (hyperlipidemia)      Peripheral vascular disease      History of myomectomy        SOCIAL HISTORY:  Tobacco Usage:  (   ) never smoked   (   ) former smoker   (   ) current smoker  (     ) pack years    Tobacco Quit Date:  Substance Use (Street drugs): (  ) never used  (  ) other:  Alcohol Usage:    Family history reviewed and otherwise non-contributory  ALLERGIES:  fish (Anaphylaxis)  No Known Drug Allergies  coffee (Unknown)    MEDICATIONS:  calamine/zinc oxide Lotion 1 Application(s) Topical daily  cefTRIAXone   IVPB 1000 milliGRAM(s) IV Intermittent every 24 hours  cholestyramine Powder (Sugar-Free) 4 Gram(s) Oral two times a day  diphenhydrAMINE 25 milliGRAM(s) Oral every 6 hours PRN  enoxaparin Injectable 40 milliGRAM(s) SubCutaneous every 24 hours  melatonin 3 milliGRAM(s) Oral at bedtime PRN  sodium chloride 0.9%. 500 milliLiter(s) IV Continuous <Continuous>  valsartan 160 milliGRAM(s) Oral daily      REVIEW OF SYSTEMS:  Negative except as above in HPI.    Vital Signs Last 24 Hrs  T(F): 98 (24 Apr 2024 05:30), Max: 98.5 (23 Apr 2024 13:50)  HR: 61 (24 Apr 2024 05:30) (59 - 88)  BP: 126/69 (24 Apr 2024 05:30) (126/69 - 171/85)  RR: 16 (24 Apr 2024 05:30) (16 - 20)  SpO2: 100% (24 Apr 2024 05:30) (97% - 100%)  I&O's Summary    PHYSICAL EXAM:  GENERAL: NAD, well-groomed  HEAD:  Atraumatic, Normocephalic  EYES: PERRLA, normal conjunctiva, anicteric  ENMT: Moist mucous membranes, no mucositis  NECK: Supple, No JVD  CHEST/LUNG: Clear to auscultation bilaterally; No rales, rhonchi, wheezing, or rubs  HEART: Regular rate and rhythm; S1/S2, No murmurs, rubs, or gallops  ABDOMEN: Soft, Nontender, Nondistended; Bowel sounds present  VASCULAR: Normal pulses, Normal capillary refill  EXTREMITIES:  2+ Peripheral Pulses, No cyanosis, No edema  LYMPH: No lymphadenopathy noted  SKIN: Warm, Intact  PSYCH: Normal mood and affect  NERVOUS SYSTEM:  A/O x3, CN 2-12 intact, No focal deficits    LABS:                        10.4   6.20  )-----------( 297      ( 24 Apr 2024 06:06 )             28.1     04-24    127  |  93  |  9   ----------------------------<  110  3.4   |  20  |  0.35    Ca    8.9      24 Apr 2024 06:06  Phos  2.8     04-24  Mg     1.70     04-24    TPro  7.3  /  Alb  3.3  /  TBili  19.3  /  DBili  x   /  AST  53  /  ALT  29  /  AlkPhos  338  04-24    PT/INR - ( 24 Apr 2024 06:06 )   PT: 14.9 sec;   INR: 1.33 ratio     PTT - ( 24 Apr 2024 06:06 )  PTT:34.5 sec    14:16 - VBG - pH: 7.40  | pCO2: 51    | pO2: 24    | Lactate: 0.8        RADIOLOGY & ADDITIONAL TESTS:  < from: CT Abdomen and Pelvis w/ IV Cont (04.23.24 @ 17:36) >  FINDINGS:  LOWER CHEST: Mild bibasilar atelectasis. Aortic valve calcifications.    LIVER: Within normal limits.  BILE DUCTS: Intrahepatic and extrahepatic biliary dilation. Common bile   duct measures up to 1.4 cm.  GALLBLADDER: Cholelithiasis with a gallstone noted within the contracted   fundus. This could represent sequelae of chronic cholecystitis.  SPLEEN: Within normal limits.  PANCREAS: Poorly defined uncinate process hypodense mass measuring 1.4 cm   (304-45) versus a portion of the dilated pancreatic duct. There is also a   masslike appearance of the pancreatic head measuring approximately 3.2 x   3.4 cm (304-51) with corresponding duodenal wall thickening, though this   area enhances similar to the remainingnormal pancreatic parenchyma. The   low-attenuation mass is confined to the pancreatic parenchyma without   evidence of arterial encasement. There is approximately 90 degree contact   with the superior mesenteric vein near the portal splenic confluence and  ADRENALS: There is mild nodular thickening of the left adrenal gland   centrally. Normal right adrenal gland.  KIDNEYS/URETERS: Within normal limits.    BLADDER: There is mild thickening of the bladder base with prominence of   the proximal urethra. No discrete bladder lesion identified.  REPRODUCTIVE ORGANS: Uterus and adnexa within normal limits.    BOWEL: No bowel obstruction. Appendix is not visualized. No evidence of   inflammation in the pericecal region. Colonic diverticulosis. There is   apparent wall thickening in the right hemicolon, favored to be due to   underdistention.  PERITONEUM: No ascites.  VESSELS: Atherosclerotic changes. The right hepatic artery is replaced   off of the superior mesenteric artery.  RETROPERITONEUM/LYMPH NODES: No lymphadenopathy.  ABDOMINAL WALL: Postsurgical changes.  BONES: Degenerative changes.    IMPRESSION:  There is a very subtle approximately 1.4 cm mass within the uncinate   process of the pancreas with marked pancreatic and biliary ductal   dilatation, suspicious for adenocarcinoma. There is approximately 90   degrees abutment of the portosplenic confluence. Otherwise no significant   vascular involvement identified. Further evaluation with endoscopic   ultrasound is recommended.    There is also masslike fullness of the pancreatic head, which is   nonspecific and may simply represent normal residual pancreatic   parenchyma, there also appears to be focal wall thickening of the   adjacent duodenum. This could also be assessed with upper   endoscopy/endoscopic ultrasound recommended above.    No evidence of metastatic disease within the abdomen or pelvis.    Cholelithiasis.    There is extensive diverticulosis of the entirety of the large bowel and   a long segment of wall thickening of the right hemicolon, which could be   due to underdistention or an underlying colitis in the appropriate   clinical setting.    Extensive aortic valve leaflet calcifications are noted. Correlation for   aortic stenosis is suggested.    < end of copied text >   CC: Patient is a 89y old  Female who presents with a chief complaint of Pancreatic mass, abnormal lab-work (24 Apr 2024 10:20)    HPI:  Patient is a 89 year-old female with history of HTN, HLD, [place], and peripheral vascular disease presenting to the ED for elevated LFTs over the past 3 weeks with symptoms of painless jaundice and itchiness. Patient recently returned this morning from [facility] after spending around a year outside the country. Prior to her return, she went to a doctor due to general malaise, itchiness, and just overall generally "feeling sick". She was found to have elevated LFTs and bilirubin on those labs (unclear on exact values). She then also noticed jaundice as well as itchiness. Patient's son at bedside reports endorses that she has lost weight over the last several months and patient states she just doesn't feel like eating as much. She otherwise denies fevers/chills, headaches, chest pain, difficulty breathing, abdominal pain, constipation/diarrhea, dysuria, recent sick contacts. Endorses stools have been lighter in color.     In the ED, patient vitals T 98.5F, HR 88, /91, and O2 saturation of 99% on RA with RR 18. Labs notable for elevated lipase, elevated Tbili, elevated Alk Phos and AST, and mild electrolyte abnormalities. CXR with clear lungs. CT abd/pelvis with main findings of biliary dilatation as well as pancreatic mass. Received Benadryl, potassium supplementation, and 500cc NS bolus in the ED.     Oncology consulted to help assist with further management as in line with patient's wishes. Patient clearly states she does not care about a cancer diagnosis but does wish for her symptoms to improve. She wishes to make whatever time she has left comfortable and is at peace. She does not want to receive anything that will decrease her ability to care for herself or walk. Also of note, patient is a [employer] day.    PAST MEDICAL & SURGICAL HISTORY:  HTN (hypertension)  HLD (hyperlipidemia)  Peripheral vascular disease  History of myomectomy    SOCIAL HISTORY:  Denies tobacco, EtOH, illicit substances.  Lives with son while in the  but also spends a significant amount of her time at home in [facility].  Ambulates with walker and cane.     Family history reviewed and otherwise non-contributory  ALLERGIES:  fish (Anaphylaxis)  No Known Drug Allergies  coffee (Unknown)    MEDICATIONS:  calamine/zinc oxide Lotion 1 Application(s) Topical daily  cefTRIAXone   IVPB 1000 milliGRAM(s) IV Intermittent every 24 hours  cholestyramine Powder (Sugar-Free) 4 Gram(s) Oral two times a day  diphenhydrAMINE 25 milliGRAM(s) Oral every 6 hours PRN  enoxaparin Injectable 40 milliGRAM(s) SubCutaneous every 24 hours  melatonin 3 milliGRAM(s) Oral at bedtime PRN  sodium chloride 0.9%. 500 milliLiter(s) IV Continuous <Continuous>  valsartan 160 milliGRAM(s) Oral daily      REVIEW OF SYSTEMS:  Negative except as above in HPI.    Vital Signs Last 24 Hrs  T(F): 98 (24 Apr 2024 05:30), Max: 98.5 (23 Apr 2024 13:50)  HR: 61 (24 Apr 2024 05:30) (59 - 88)  BP: 126/69 (24 Apr 2024 05:30) (126/69 - 171/85)  RR: 16 (24 Apr 2024 05:30) (16 - 20)  SpO2: 100% (24 Apr 2024 05:30) (97% - 100%)  I&O's Summary    PHYSICAL EXAM:  GENERAL: NAD, well-groomed  HEAD:  Atraumatic, Normocephalic  EYES: b/l marked scleral icterus  ENMT: Moist mucous membranes  NECK: Supple, No JVD  CHEST/LUNG: Clear to auscultation bilaterally; No rales, rhonchi, wheezing, or rubs  HEART: Regular rate and rhythm; +2 systolic murmur, rubs, or gallops  ABDOMEN: Soft, Nontender, Nondistended; Bowel sounds present  VASCULAR: Normal pulses, Normal capillary refill  EXTREMITIES:  2+ Peripheral Pulses, No cyanosis, No edema  LYMPH: No lymphadenopathy noted  SKIN: dry and with lotion for pruritis  PSYCH: Normal mood and affect  NERVOUS SYSTEM:  A/O x4, No focal deficits    LABS:                        10.4   6.20  )-----------( 297      ( 24 Apr 2024 06:06 )             28.1     04-24    127  |  93  |  9   ----------------------------<  110  3.4   |  20  |  0.35    Ca    8.9      24 Apr 2024 06:06  Phos  2.8     04-24  Mg     1.70     04-24    TPro  7.3  /  Alb  3.3  /  TBili  19.3  /  DBili  x   /  AST  53  /  ALT  29  /  AlkPhos  338  04-24    PT/INR - ( 24 Apr 2024 06:06 )   PT: 14.9 sec;   INR: 1.33 ratio     PTT - ( 24 Apr 2024 06:06 )  PTT:34.5 sec    14:16 - VBG - pH: 7.40  | pCO2: 51    | pO2: 24    | Lactate: 0.8      RADIOLOGY & ADDITIONAL TESTS:  < from: CT Abdomen and Pelvis w/ IV Cont (04.23.24 @ 17:36) >  FINDINGS:  LOWER CHEST: Mild bibasilar atelectasis. Aortic valve calcifications.    LIVER: Within normal limits.  BILE DUCTS: Intrahepatic and extrahepatic biliary dilation. Common bile   duct measures up to 1.4 cm.  GALLBLADDER: Cholelithiasis with a gallstone noted within the contracted   fundus. This could represent sequelae of chronic cholecystitis.  SPLEEN: Within normal limits.  PANCREAS: Poorly defined uncinate process hypodense mass measuring 1.4 cm   (304-45) versus a portion of the dilated pancreatic duct. There is also a   masslike appearance of the pancreatic head measuring approximately 3.2 x   3.4 cm (304-51) with corresponding duodenal wall thickening, though this   area enhances similar to the remainingnormal pancreatic parenchyma. The   low-attenuation mass is confined to the pancreatic parenchyma without   evidence of arterial encasement. There is approximately 90 degree contact   with the superior mesenteric vein near the portal splenic confluence and  ADRENALS: There is mild nodular thickening of the left adrenal gland   centrally. Normal right adrenal gland.  KIDNEYS/URETERS: Within normal limits.    BLADDER: There is mild thickening of the bladder base with prominence of   the proximal urethra. No discrete bladder lesion identified.  REPRODUCTIVE ORGANS: Uterus and adnexa within normal limits.    BOWEL: No bowel obstruction. Appendix is not visualized. No evidence of   inflammation in the pericecal region. Colonic diverticulosis. There is   apparent wall thickening in the right hemicolon, favored to be due to   underdistention.  PERITONEUM: No ascites.  VESSELS: Atherosclerotic changes. The right hepatic artery is replaced   off of the superior mesenteric artery.  RETROPERITONEUM/LYMPH NODES: No lymphadenopathy.  ABDOMINAL WALL: Postsurgical changes.  BONES: Degenerative changes.    IMPRESSION:  There is a very subtle approximately 1.4 cm mass within the uncinate   process of the pancreas with marked pancreatic and biliary ductal   dilatation, suspicious for adenocarcinoma. There is approximately 90   degrees abutment of the portosplenic confluence. Otherwise no significant   vascular involvement identified. Further evaluation with endoscopic   ultrasound is recommended.    There is also masslike fullness of the pancreatic head, which is   nonspecific and may simply represent normal residual pancreatic   parenchyma, there also appears to be focal wall thickening of the   adjacent duodenum. This could also be assessed with upper   endoscopy/endoscopic ultrasound recommended above.    No evidence of metastatic disease within the abdomen or pelvis.    Cholelithiasis.    There is extensive diverticulosis of the entirety of the large bowel and   a long segment of wall thickening of the right hemicolon, which could be   due to underdistention or an underlying colitis in the appropriate   clinical setting.    Extensive aortic valve leaflet calcifications are noted. Correlation for   aortic stenosis is suggested.    < end of copied text >

## 2024-04-24 NOTE — CONSULT NOTE ADULT - CONVERSATION DETAILS
Palliative consulted for complex medical decision making and symptom management in the setting of likely pancreatic malignancy seen on imaging. [facility], patient expressed wanting limited intervention and considering whether or not to pursue diagnostic and therapeautic interventions for presumed malignancy.     Palliative team met with pt and family. Pt was sleepy after benadryl during our initial conversation. Her son Ganesh was present in person and niece Yu joined via telephone. Ganesh defers to Yu. Yu shared that they understand that patient has a pancreatic mass concerning for cancer and that EGD may be one option. The family would like to hear options before making decisions regarding biopsy, treatments etc as they shared that patient's qol important and she would not want aggressive measures. We encouraged them to speak to oncology to find out what options they may have. Discussed that EGD and stenting likely would be beneficial for symptoms relief and may also be an opportunity to get biospy to confirm diagnosis.  Shared that if patient does not wish to undergo diagnostic workup or treatment than home hospice service would be appropriate.   Patient currently lives at home with her son Ganesh and is still functional of ADLs.    Discussed advanced directives including CPR and mechanical ventilation in setting of possible malignancy. Reviewed the MOLST form patient completed yesterday for DNR and trial of intubation. We discussed that intubation in her age with likely malignancy is likely to be more harmful than beneficial. Yu shared that they want to honor pt's wishes at this time and want to speak to her further. They also confirmed that she's a [employer] witness and would not want blood transfusions.    Pt later woke up, was orientated and shared that her rachel was very important to her and that she cares about her overall well being. Family asked for time to speak to her by themselves with the information that was shared with them today.

## 2024-04-24 NOTE — MEDICAL STUDENT PROGRESS NOTE(EDUCATION) - ASSESSMENT
89F with PMH of HTN, HLD, [place], and peripheral vascular disease presenting to the ED for elevated LFTs over the past 3 weeks with symptoms of painless jaundice and itchiness, found to have elevated Bilirubin and Alk Phos with CT findings of CBD dilatation iso pancreatic mass, admitted for further work-up and management       Problem/Plan - 1:  ·  Problem: Pancreatic mass.   ·  Plan: Presents with generalized malaise, weight loss, jaundice and itchiness. Found to have pancreatic  mass on CT imaging with concern for adenocarcinoma  - CT imaging with 1.4 cm mass within uncinate process of pancreas w/ marked pancreatic and biliary ductal dilatation, suspicious for adenocarcinoma. Abuts portosplenic confluence but otherwise no vascular involvement identified. Further evaluation with endoscopic US is recommended. No evidence of metastatic disease  - also mass-like nonspecific fullness of pancreatic head  - patient denies pulmonary symptoms regarding chest involvement  - lipase elevated 371 however patient without significant pain currently. Although CT does show pancreatic nonspecific findings, will be conservative with fluids in setting of extensively calcified aortic valve as well as systolic murmur on exam and patient's current lack of abdominal symptoms. Unclear if representing pancreatitis/mass on CT.  - GOC initiated with patient and family, amenable to symptom-relief and possible biopsy, family to discuss further in AM  - GI emailed for EUS and possible biopsy, f/u recs.     Problem/Plan - 2:  ·  Problem: Cholestasis.   ·  Plan: Presents with pruritis, jaundice, and elevated Bilirubin, Alk Phos, and AST, likely in setting of obstruction/compression by pancreatic mass  - CT imaging findings as above  - R factor 0.3, cholestatic  - No leukocytosis, no abdominal pain nor tenderness, no fevers/chills lately, no diarrhea nor signs/symptoms of possible colitis mentioned on CT  - sequelae of chronic cholecystitis likely iso above CT findings  - f/u hepatitis panel in ED  - f/u GI recs regarding possible stent.     Problem/Plan - 3:  ·  Problem: Pruritus.   ·  Plan: Likely iso cholestasis given above. No rashes or lesions visualized.   - can cont with antihistamine to assess if improvement  - trial Cholestyramine BID.     Problem/Plan - 4:  ·  Problem: UTI (urinary tract infection).   ·  Plan: UA with WBC and many bacteria. Endorses burning with urination  - CT abd/pelvis with findings of mild thickening of the bladder base with prominence of the proximal urethra  - Ceftriaxone for simple cystitis  - f/u UCx, tailor antibiotics.     Problem/Plan - 5:  ·  Problem: Hypokalemia.   ·  Plan: K 3.2 on admission as well as mild hyponatremia on admission, likely iso poor PO intake given poor appetite and weight loss  - cont to trend electrolytes and replete PRN.     Problem/Plan - 6:  ·  Problem: Aortic stenosis.   ·  Plan: CT findings extensive aortic valve leaflet calcifications, systolic murmur appreciated on exam  - denies any significant change or decrease in functional status but baseline functional status is low given her age  - no significant swelling nor SOB at rest  - f/u TTE.     Problem/Plan - 7:  ·  Problem: Hypertension.   ·  Plan: On Diovan and Natrilix at home  - cont with home valsartan.     Problem/Plan - 8:  ·  Problem: Need for prophylactic measure.   ·  Plan: DVT ppx: Lovenox  Diet: DASH/TLC, no coffee/fish  PT c/s     89F with PMH of HTN, HLD, [place], and peripheral vascular disease presenting to the ED for elevated LFTs over the past 3 weeks with symptoms of painless jaundice and itchiness, found to have elevated Bilirubin and Alk Phos with CT findings of CBD dilatation iso pancreatic mass, admitted for further work-up and management       Problem/Plan - 1:  ·  Problem: Pancreatic mass.   ·  Plan: Presents with generalized malaise, weight loss, jaundice and itchiness. Found to have pancreatic  mass on CT imaging with concern for adenocarcinoma  - CT imaging with 1.4 cm mass within uncinate process of pancreas w/ marked pancreatic and biliary ductal dilatation, suspicious for adenocarcinoma. Abuts portosplenic confluence but otherwise no vascular involvement identified. Further evaluation with endoscopic US is recommended. No evidence of metastatic disease  - also mass-like nonspecific fullness of pancreatic head  - patient denies pulmonary symptoms regarding chest involvement  - lipase elevated 371 however patient without significant pain currently. Although CT does show pancreatic nonspecific findings, will be conservative with fluids in setting of extensively calcified aortic valve as well as systolic murmur on exam and patient's current lack of abdominal symptoms. Unclear if representing pancreatitis/mass on CT.  - GOC initiated with patient and family, amenable to symptom-relief and possible biopsy, family to discuss further in AM  - GI emailed for EUS and possible biopsy, f/u recs.     Problem/Plan - 2:  ·  Problem: Cholestasis.   ·  Plan: Presents with pruritis, jaundice, and elevated Bilirubin, Alk Phos, and AST, likely in setting of obstruction/compression by pancreatic mass  - CT imaging findings as above  - R factor 0.3, cholestatic  - No leukocytosis, no abdominal pain nor tenderness, no fevers/chills lately, no diarrhea nor signs/symptoms of possible colitis mentioned on CT  - sequelae of chronic cholecystitis likely iso above CT findings  - f/u hepatitis panel in ED  - f/u GI recs regarding possible stent.     Problem/Plan - 3:  ·  Problem: Pruritus.   ·  Plan: Likely iso cholestasis given above. No rashes or lesions visualized.   - can cont with antihistamine to assess if improvement  - trial Cholestyramine BID.     Problem/Plan - 4:  ·  Problem: UTI (urinary tract infection).   ·  Plan: UA with WBC and many bacteria. Endorses burning with urination  - CT abd/pelvis with findings of mild thickening of the bladder base with prominence of the proximal urethra  - Ceftriaxone for simple cystitis  - f/u UCx, tailor antibiotics.     Problem/Plan - 5:  ·  Problem: Hypokalemia.   ·  Plan: K 3.2 on admission as well as mild hyponatremia on admission, likely iso poor PO intake given poor appetite and weight loss  - cont to trend electrolytes and replete PRN.    Problem/Plan - 6:  ·  Problem: Hyponatremia.   ·  Plan: Na 129 on admission downtrending, likely iso poor PO intake given poor appetite and weight loss  - cont to trend electrolytes.  - Obtain urine osmolarity and Na     Problem/Plan - 7:  ·  Problem: Aortic stenosis.   ·  Plan: CT findings extensive aortic valve leaflet calcifications, systolic murmur appreciated on exam  - denies any significant change or decrease in functional status but baseline functional status is low given her age  - no significant swelling nor SOB at rest  - f/u TTE.     Problem/Plan - 8:  ·  Problem: Hypertension.   ·  Plan: On Diovan and Natrilix at home  - cont with home valsartan.     Problem/Plan - 9:  ·  Problem: Need for prophylactic measure.   ·  Plan: DVT ppx: Lovenox  Diet: DASH/TLC, no coffee/fish  PT c/s     89F with PMH of HTN, HLD, [place], and peripheral vascular disease presenting to the ED for elevated LFTs over the past 3 weeks with symptoms of painless jaundice and itchiness, found to have elevated Bilirubin and Alk Phos with CT findings of CBD dilatation iso pancreatic mass, admitted for further work-up and management       Problem/Plan - 1:  ·  Problem: Pancreatic mass.   ·  Plan: Presents with generalized malaise, weight loss, jaundice and itchiness. Found to have pancreatic  mass on CT imaging with concern for adenocarcinoma  - CT imaging with 1.4 cm mass within uncinate process of pancreas w/ marked pancreatic and biliary ductal dilatation, suspicious for adenocarcinoma. Abuts portosplenic confluence but otherwise no vascular involvement identified. Further evaluation with endoscopic US is recommended. No evidence of metastatic disease  - also mass-like nonspecific fullness of pancreatic head  - patient denies pulmonary symptoms regarding chest involvement  - lipase elevated 371 however patient without significant pain currently. Although CT does show pancreatic nonspecific findings, will be conservative with fluids in setting of extensively calcified aortic valve as well as systolic murmur on exam and patient's current lack of abdominal symptoms. Unclear if representing pancreatitis/mass on CT.  - GOC initiated with patient and family, amenable to symptom-relief and possible biopsy.  - GI following, Biopsy pending family discussion with oncology. Family would like to discuss possible treatment options and outcomes in the event of malignancy prior to biopsy.  - Palliative care following, pending decision by family.     Problem/Plan - 2:  ·  Problem: Cholestasis.   ·  Plan: Presents with pruritis, jaundice, and elevated Bilirubin, Alk Phos, and AST, likely in setting of obstruction/compression by pancreatic mass  - CT imaging findings as above  - R factor 0.3, cholestatic  - No leukocytosis, no abdominal pain nor tenderness, no fevers/chills lately, no diarrhea nor signs/symptoms of possible colitis mentioned on CT  - sequelae of chronic cholecystitis likely iso above CT findings  - f/u hepatitis panel in ED  - f/u GI recs regarding possible stent.     Problem/Plan - 3:  ·  Problem: Pruritus.   ·  Plan: Likely iso cholestasis given above. No rashes or lesions visualized.   - can cont with antihistamine to assess if improvement  - trial Cholestyramine BID.     Problem/Plan - 4:  ·  Problem: UTI (urinary tract infection).   ·  Plan: UA with WBC and many bacteria. Endorses burning with urination  - CT abd/pelvis with findings of mild thickening of the bladder base with prominence of the proximal urethra  - Ceftriaxone for simple cystitis  - f/u UCx, tailor antibiotics.     Problem/Plan - 5:  ·  Problem: Hypokalemia.   ·  Plan: K 3.2 on admission as well as mild hyponatremia on admission, likely iso poor PO intake given poor appetite and weight loss  - cont to trend electrolytes and replete PRN.    Problem/Plan - 6:  ·  Problem: Hyponatremia.   ·  Plan: Na 129 on admission downtrending, likely iso poor PO intake given poor appetite and weight loss  - cont to trend electrolytes.  - Obtain TSH     Problem/Plan - 7:  ·  Problem: Aortic stenosis.   ·  Plan: CT findings extensive aortic valve leaflet calcifications, systolic murmur appreciated on exam  - denies any significant change or decrease in functional status but baseline functional status is low given her age  - no significant swelling nor SOB at rest  - f/u TTE.  - Consider cardiology consult for severe AS prior to possible procedure.     Problem/Plan - 8:  ·  Problem: Hypertension.   ·  Plan: On Diovan and Natrilix at home  - cont with home valsartan.     Problem/Plan - 9:  ·  Problem: Need for prophylactic measure.   ·  Plan: DVT ppx: Lovenox  Diet: DASH/TLC, no coffee/fish  PT c/s

## 2024-04-24 NOTE — PHYSICAL THERAPY INITIAL EVALUATION ADULT - ADDITIONAL COMMENTS
Patient lives with son in apartment, no steps to negotiate. patient was independent in all ADL prior and was using Patient lives with son in apartment, no steps to negotiate. patient was independent in all ADL prior and was using a cane and walker prior to admission for ambulation.

## 2024-04-24 NOTE — DIETITIAN INITIAL EVALUATION ADULT - PERTINENT MEDS FT
MEDICATIONS  (STANDING):  calamine/zinc oxide Lotion 1 Application(s) Topical daily  cefTRIAXone   IVPB 1000 milliGRAM(s) IV Intermittent every 24 hours  chlorhexidine 2% Cloths 1 Application(s) Topical <User Schedule>  cholestyramine Powder (Sugar-Free) 4 Gram(s) Oral two times a day  enoxaparin Injectable 40 milliGRAM(s) SubCutaneous every 24 hours  sodium chloride 0.9%. 500 milliLiter(s) (75 mL/Hr) IV Continuous <Continuous>  valsartan 160 milliGRAM(s) Oral daily    MEDICATIONS  (PRN):  melatonin 3 milliGRAM(s) Oral at bedtime PRN Insomnia

## 2024-04-24 NOTE — PHYSICAL THERAPY INITIAL EVALUATION ADULT - PERTINENT HX OF CURRENT PROBLEM, REHAB EVAL
89 year old female presenting to the ED for elevated LFTs over the past 3 weeks with symptoms of painless jaundice and itchiness, found to have elevated Bilirubin and Alk Phos with CT findings of CBD dilatation iso pancreatic mass, admitted for further work-up and management

## 2024-04-24 NOTE — CONSULT NOTE ADULT - TIME BILLING
Time spent for extensive review of the physical chart, electronic medical record, and documentation to obtain collateral information including but not limited to:  [x ] Inpatient records (ED, H&P, primary team, and consultants if applicable, care coordination)  [x] Inpatient values/results (biomarkers, immunoassays, imaging, and microbiology results)  [x] Current or proposed treatment plans  [x] Discussion with the primary team  [x] Discussion with the patient, surrogate decision maker, or family    Time spent: >75

## 2024-04-24 NOTE — CONSULT NOTE ADULT - SUBJECTIVE AND OBJECTIVE BOX
Chief Complaint:  Patient is a 89y old  Female who presents with a chief complaint of Pancreatic mass, abnormal lab-work (24 Apr 2024 07:53)      HPI:ALYSIA JOY is a 89y Female with HTN, HLD, PVD who initially presented to outside doctor after returning from [facility] with complaints of severe, diffuse itching and yellowing of her eyes w/o abdominal pain x 2-3 weeks, where she was noted to have elevated liver chemistries and instructed to go to the ED, where she was noted to have labs significant for bili  21.1 (direct 16), , AST 54, ALT 32, hgb 10, INR 1.3, Na 1.3, lipase 371 and CTAP revealing intra/extrahep biliary dilatation with a CBD measuring up to 1.4 cm, a poorly defined unicnate process hypodense mass measuring 1.4 cm vs a portion of the dilated PD but suspicious for adenocarcinoma as well as a masslike appearance of the pancreatic head measuring approximately 3.2 x 3.4 cm corresponding with duodenal wall thickening though reporting enhances similarly t the remaining normal pancreatic parenchyma. There was mention of ~90 degree contact with the SMV near the portal splenic confluence and nodular thickening of the L adrenal gland central, cholelithiasis with possible sequelae of chronic cholecystitis, colonic diverticulosis, apparent wall thickening of the rt hemicolon thoug hfavored to be due to underdistention as per report; otherwise no evidence of mets in the abdomen or pelvis; mention of findings suggestive of aortic stenosis. Advanced GI consulted possible EUS of pancreatic mass.    Patient and family at bedside/on the phone; also reporting unknown amount of weight loss, no n/v/d or constipation, no bloody red/black stools. Decreased po intake and feeling weak, but denies abdominal pain f/c. Denies fhx of malignancy. Non smoker, no illicit/IVUD, or alcohol. No prior EGD/colonoscopy.     PMHX/PSHX:  HTN (hypertension)    HLD (hyperlipidemia)    Peripheral vascular disease    History of myomectomy      Allergies:  fish (Anaphylaxis)  No Known Drug Allergies  coffee (Unknown)      Home Medications: reviewed  Hospital Medications:  cefTRIAXone   IVPB 1000 milliGRAM(s) IV Intermittent every 24 hours  cholestyramine Powder (Sugar-Free) 4 Gram(s) Oral two times a day  enoxaparin Injectable 40 milliGRAM(s) SubCutaneous every 24 hours  melatonin 3 milliGRAM(s) Oral at bedtime PRN  potassium chloride    Tablet ER 40 milliEquivalent(s) Oral once  sodium chloride 0.9%. 500 milliLiter(s) IV Continuous <Continuous>  valsartan 160 milliGRAM(s) Oral daily      Social History:   Tob: Denies  EtOH: Denies  Illicit Drugs: Denies    Family history:  Denies fhx of malignancy.    ROS:   Complete and normal except as mentioned above.    PHYSICAL EXAM:   Vital Signs:  Vital Signs Last 24 Hrs  T(C): 36.7 (24 Apr 2024 05:30), Max: 36.9 (23 Apr 2024 13:50)  T(F): 98 (24 Apr 2024 05:30), Max: 98.5 (23 Apr 2024 13:50)  HR: 61 (24 Apr 2024 05:30) (59 - 88)  BP: 126/69 (24 Apr 2024 05:30) (126/69 - 171/85)  BP(mean): --  RR: 16 (24 Apr 2024 05:30) (16 - 20)  SpO2: 100% (24 Apr 2024 05:30) (97% - 100%)    Parameters below as of 24 Apr 2024 05:30  Patient On (Oxygen Delivery Method): room air      Daily Height in cm: 172.72 (24 Apr 2024 01:35)    Daily     GENERAL: no acute distress  NEURO: alert and oriented  HEENT: scleral icterus  CHEST: no respiratory distress, no accessory muscle use  CARDIAC: regular rate   ABDOMEN: soft, non-tender, non-distended, no rebound or guarding  EXTREMITIES: warm, well perfused   SKIN: no lesions noted    LABS:                          10.4   6.20  )-----------( 297      ( 24 Apr 2024 06:06 )             28.1     04-24    127<L>  |  93<L>  |  9   ----------------------------<  110<H>  3.4<L>   |  20<L>  |  0.35<L>    Ca    8.9      24 Apr 2024 06:06  Phos  2.8     04-24  Mg     1.70     04-24    TPro  7.3  /  Alb  3.3  /  TBili  19.3<H>  /  DBili  x   /  AST  53<H>  /  ALT  29  /  AlkPhos  338<H>  04-24    LIVER FUNCTIONS - ( 24 Apr 2024 06:06 )  Alb: 3.3 g/dL / Pro: 7.3 g/dL / ALK PHOS: 338 U/L / ALT: 29 U/L / AST: 53 U/L / GGT: x               Diagnostic Studies: see sunrise for full report   CTAP 4/23 revealing intra/extrahep biliary dilatation with a CBD measuring up to 1.4 cm, a poorly defined unicnate process hypodense mass measuring 1.4 cm vs a portion of the dilated PD but suspicious for adenocarcinoma as well as a masslike appearance of the pancreatic head measuring approximately 3.2 x 3.4 cm corresponding with duodenal wall thickening though reporting enhances similarly t the remaining normal pancreatic parenchyma. There was mention of ~90 degree contact with the SMV near the portal splenic confluence and nodular thickening of the L adrenal gland central, cholelithiasis with possible sequelae of chronic cholecystitis, colonic diverticulosis, apparent wall thickening of the rt hemicolon thoug hfavored to be due to underdistention as per report; otherwise no evidence of mets in the abdomen or pelvis; mention of

## 2024-04-24 NOTE — CONSULT NOTE ADULT - ASSESSMENT
89 year-old female with history of HTN, HLD, [place], and peripheral vascular disease presenting to the ED for elevated LFTs over the past 3 weeks with symptoms of painless jaundice and itchiness. Patient recently returned this morning from [facility] [place] [facility]"" after spending around a year outside the country.  Labs notable for elevated lipase, elevated Tbili, elevated Alk Phos and AST, and mild electrolyte abnormalities. CT abd/pelvis with main findings of biliary dilatation as well as pancreatic mass.   Palliative consulted for complex medical decision making and symptom management in the setting of serious illness. Pt with findings concerning for pancreatic cancer.

## 2024-04-24 NOTE — DIETITIAN INITIAL EVALUATION ADULT - OTHER INFO
Medical Course: Per H&P 4/23 - Patient is a 89 year-old female with history of HTN, HLD, [place], and peripheral vascular disease presenting to the ED for elevated LFTs over the past 3 weeks with symptoms of painless jaundice and itchiness. Patient recently returned this morning from [facility] after spending around a year outside the country. Prior to her return, she went to a doctor due to general malaise, itchiness, and just overall generally "feeling sick". She was found to have elevated LFTs and bilirubin on those labs (unclear on exact values). She then also noticed jaundice as well as itchiness.     Nutrition interview: No recent episodes of nausea, vomiting, diarrhea or constipation. Last BM noted on 4/23 per RN flowsheets. Denies any chewing/swallowing difficulties with food provided, currently receiving soft and bite sized diet. Intake is % per RN flowsheets and per pt. Food preferences explored and noted. Stated UBW: 135 lbs. Stated she feels she lost about 10 pounds in the last year from her condition of pancreatic cancer. No HIE history available. Noted hyponatremia, is receiving sodium chloride IVF. Noted low potassium, is receiving potassium chloride.

## 2024-04-24 NOTE — CONSULT NOTE ADULT - ASSESSMENT
89 year-old female with history of HTN, HLD, [place], and peripheral vascular disease admitted for work up of jaundice, pruritis and pancreatic mass concerning for malignancy. Oncology consulted to help with guidance regarding work up.    #Obstructive pancreatic mass  #Hyperbilirubinemia  - High suspicion of pancreatic cancer based on imaging  - Had in depth discussion with patient regarding goals of care. She clearly stated that she just wants symptom relief at this time and does not care whether or not she has cancer.  - Given that pancreatic lesion causing biliary obstruction, believe that EUS with possible stent placement is in lines with patient's goals of care for symptom relief. It is not unreasonable to obtain a pancreatic mass biopsy at the same time if this does not increase the risk of the procedure.  - In general, pancreatic cancer has a very poor prognosis and given patient's age, though her ECOG is 1-2, she is unlikely to gain any significant benefit from chemotherapy.   - If in line with patient's goals of care, can consider CT Chest to evaluate for metastatic disease  - Recommend palliative care consult for symptom management and to discuss hospice    Rest of care as per primary team.  Thank you for the consult and will continue to follow along.    **Please be aware note/recommendations not finalized until attending addendum complete.**    Helena Winn DO, MPH  Medical Oncology Fellow, PGY-8  *Can be reached via Teams, but please contact the on-call fellow after 5pm-8am on weekdays and on weekends. 89 year-old female with history of HTN, HLD, [place], and peripheral vascular disease admitted for work up of jaundice, pruritis and pancreatic mass concerning for malignancy. Oncology consulted to help with guidance regarding work up.    #Obstructive pancreatic mass  #Hyperbilirubinemia  - High suspicion of pancreatic cancer based on imaging  - Had in depth discussion with patient regarding goals of care. She clearly stated that she just wants symptom relief at this time and does not care whether or not she has cancer.  - Given that pancreatic lesion causing biliary obstruction, believe that EUS with possible stent placement is in lines with patient's goals of care for symptom relief. It is not unreasonable to obtain a pancreatic mass biopsy at the same time if this does not increase the risk of the procedure. The patient has agreed to this and wants to focus on symptom relief but says she is not concerned about the cancer at her age.  - In general, pancreatic cancer has a very poor prognosis and given patient's age, though her ECOG is 1-2, she is unlikely to gain any significant benefit from chemotherapy.   - Recommend palliative care consult for symptom management and to discuss hospice.    Rest of care as per primary team.  Thank you for the consult and will continue to follow along.      Helena Winn DO, MPH  Medical Oncology Fellow, PGY-8  *Can be reached via Teams, but please contact the on-call fellow after 5pm-8am on weekdays and on weekends.

## 2024-04-24 NOTE — CONSULT NOTE ADULT - PROBLEM SELECTOR RECOMMENDATION 2
Pt with it elevated bilirubin >20  CT:  Intrahepatic and extrahepatic biliary dilation. Common bile duct measures up to 1.4 cm.  GI consulted, discussed option for EGD/stenting/biopsy, pt/family requesting speaking to oncology and other providers prior to making a decision

## 2024-04-24 NOTE — PROGRESS NOTE ADULT - ASSESSMENT
89F with PMH of HTN, HLD, [place], and peripheral vascular disease presenting to the ED for elevated LFTs over the past 3 weeks with symptoms of painless jaundice and itchiness, found to have elevated Bilirubin and Alk Phos with CT findings of CBD dilatation iso pancreatic mass, admitted for further work-up and management 89F with PMH of HTN, HLD, [place], and peripheral vascular disease presenting to the ED for elevated LFTs over the past 3 weeks with symptoms of painless jaundice and itchiness, found to have elevated Bilirubin and Alk Phos with CT findings of CBD dilatation iso pancreatic mass, admitted for further work-up and management.

## 2024-04-24 NOTE — DIETITIAN INITIAL EVALUATION ADULT - ORAL INTAKE PTA/DIET HISTORY
Spoke to pt at bedside, friend was present and assisted in diet history. Pt with allergies for fish and coffee. Does not have any food intolerances or follow any specific diet at home. Pt with good intake PTA 2-3 meals a day. Pt did not take any protein supplements PTA. Took Vit D, C, B12 PTA.

## 2024-04-24 NOTE — PROGRESS NOTE ADULT - PROBLEM SELECTOR PLAN 8
DVT ppx: Lovenox  Diet: DASH/TLC, no coffee/fish  PT c/s    GOC: DNR/intubate per discussion overnight (unclear if time of night affected discussion). Was DNR/DNI per family members. Would benefit from re-addressing in the morning regarding code status and updating order and MOLST if changed.

## 2024-04-24 NOTE — CONSULT NOTE ADULT - ATTENDING COMMENTS
Agree with above  Please notify us If EUS-FNB and Endoscopic decompression are within GOC
89 year-old female with history of HTN, HLD, [place], and peripheral vascular disease admitted for work up of jaundice, pruritis and pancreatic mass concerning for malignancy. High suspicion of pancreatic cancer based on imaging. Had in depth discussion with patient regarding goals of care. She clearly stated that she just wants symptom relief at this time so is willing to proceed with EUS and stent placement. She is not concerned about the cancer at her age but cares about quality of life. She will be hospice appropriate after stent placement if she is interested in palliative care.

## 2024-04-24 NOTE — CONSULT NOTE ADULT - PROBLEM SELECTOR RECOMMENDATION 9
Pt presented with jaundice and pruritus found to have elevated bilirubin >20  CT: 1.4 cm mass within the uncinate process of the pancreas with marked pancreatic and biliary ductal   dilatation, suspicious for adenocarcinoma.  Also   Per conversation with patient and family, both on admission and today, they're interested in hearing what options she may have and then make decision about next steps as pt has expressed that given her age she would not like aggressive interventions and family confirms.   Appreciate oncology evaluation: from their conversation, recommend EGD with stents for symptomatic relief and then hospice as she expressed that she would not want to undergo treatment for cancer.

## 2024-04-24 NOTE — PROGRESS NOTE ADULT - PROBLEM SELECTOR PLAN 3
Likely iso cholestasis given above. No rashes or lesions visualized.   - can cont with antihistamine to assess if improvement  - trial Cholestyramine BID Likely iso cholestasis given above. No rashes or lesions visualized.   - can cont with antihistamine to assess if improvement  - trial Cholestyramine BID  - Limit benadryl due to sedating effects

## 2024-04-24 NOTE — CONSULT NOTE ADULT - PROBLEM SELECTOR RECOMMENDATION 5
Thank you for allowing us to participate in your patient's care. We will continue to follow with you. Please page [phone] for any q's or c's. The [facility] and Palliative Medicine service has coverage 24 hours a day/ 7 days a week to provide medical recommendations regarding symptom management needs via telephone.    Paola Ellis MD  Palliative Medicine

## 2024-04-24 NOTE — DIETITIAN INITIAL EVALUATION ADULT - NS FNS DIET ORDER
Diet, Regular:   DASH/TLC {Sodium & Cholesterol Restricted} (DASH)  Soft and Bite Sized ([id number])  No Caffeine  No Fish (04-24-24 @ 02:04)

## 2024-04-24 NOTE — DIETITIAN INITIAL EVALUATION ADULT - ADD RECOMMEND
1) Encourage PO intake and honor food preferences as able.   2) Monitor PO intake, labs, weights, BMs, and skin integrity.

## 2024-04-24 NOTE — CONSULT NOTE ADULT - PROBLEM SELECTOR RECOMMENDATION 4
GOC as documented above: Pt was lethargic after benadryl during our initial conversation. per conversation with pt's niece marco and son Gnaesh, family would like to hear options before making decisions regarding biopsy, treatments etc as they shared that patient's qol important and she would not want aggressive measures. We encouraged them to speak to oncology and shared that if patient does not wish to undergo diagnostic workup or treatment than home hospice service would be appropriate. Discussed that EGD and stenting likely would be beneficial for symptoms relief.     MOLST completed by primary team 4/23: DNR/Intubate -- discussed with niece and son recommending against intubation as patient doesn't want invasive procedures however they prefer to speak to her further before changing anything.  Updated molst today to include no blood transfusion as pt is [place]'s witness

## 2024-04-24 NOTE — DIETITIAN INITIAL EVALUATION ADULT - PERTINENT LABORATORY DATA
04-24    127<L>  |  93<L>  |  9   ----------------------------<  110<H>  3.4<L>   |  20<L>  |  0.35<L>    Ca    8.9      24 Apr 2024 06:06  Phos  2.8     04-24  Mg     1.70     04-24    TPro  7.3  /  Alb  3.3  /  TBili  19.3<H>  /  DBili  x   /  AST  53<H>  /  ALT  29  /  AlkPhos  338<H>  04-24

## 2024-04-24 NOTE — CONSULT NOTE ADULT - SUBJECTIVE AND OBJECTIVE BOX
Date of [id number] @ 19:44  HPI:  Patient is a 89 year-old female with history of HTN, HLD, [place], and peripheral vascular disease presenting to the ED for elevated LFTs over the past 3 weeks with symptoms of painless jaundice and itchiness. Patient recently returned this morning from [facility] after spending around a year outside the country. Prior to her return, she went to a doctor due to general malaise, itchiness, and just overall generally "feeling sick". She was found to have elevated LFTs and bilirubin on those labs (unclear on exact values). She then also noticed jaundice as well as itchiness. Friend at bedside reports that patient appears to have lost weight compared to before and patient states she just doesn't feel like eating as much. She otherwise denies fevers/chills, headaches, chest pain, difficulty breathing, abdominal pain, constipation/diarrhea, dysuria, recent sick contacts.     In the ED, patient vitals T 98.5F, HR 88, /91, and O2 saturation of 99% on RA with RR 18. Labs notable for elevated lipase, elevated Tbili, elevated Alk Phos and AST, and mild electrolyte abnormalities. CXR with clear lungs. CT abd/pelvis with main findings of biliary dilatation as well as pancreatic mass. Received Benadryl, potassium supplementation, and 500cc NS bolus in the ED.  (23 Apr 2024 22:50)      PERTINENT PM/SXH:   HTN (hypertension)    HLD (hyperlipidemia)    Peripheral vascular disease      History of myomectomy      FAMILY HISTORY:  Family history of GI malignancy      Family Hx substance abuse [ ]yes [ ]no    ITEMS NOT CHECKED ARE NOT PRESENT    SOCIAL HISTORY:   Significant other/partner[ ]  Children[ ]  Hoahaoism/Spirituality:  Substance hx:  [ ]   Tobacco hx:  [ ]   Alcohol hx: [ ]   Home Opioid hx:  [ ] I-Stop Reference No:  Living Situation: [ ]Home  [ ]Long term care  [ ]Rehab [ ]Other    ADVANCE DIRECTIVES:    DNR/MOLST  [ ]  Living Will  [ ]   DECISION MAKER(s):  [ ] Health Care Proxy(s)  [ ] Surrogate(s)  [ ] Guardian           Name(s): Phone Number(s):    BASELINE (I)ADL(s) (prior to admission):  [place]: [ ]Total  [ ] Moderate [ ]Dependent    Allergies    fish (Anaphylaxis)  No Known Drug Allergies  coffee (Unknown)    Intolerances    MEDICATIONS  (STANDING):  calamine/zinc oxide Lotion 1 Application(s) Topical daily  cefTRIAXone   IVPB 1000 milliGRAM(s) IV Intermittent every 24 hours  chlorhexidine 2% Cloths 1 Application(s) Topical <User Schedule>  cholestyramine Powder (Sugar-Free) 4 Gram(s) Oral two times a day  enoxaparin Injectable 40 milliGRAM(s) SubCutaneous every 24 hours  sodium chloride 0.9%. 500 milliLiter(s) (75 mL/Hr) IV Continuous <Continuous>  valsartan 160 milliGRAM(s) Oral daily    MEDICATIONS  (PRN):  melatonin 3 milliGRAM(s) Oral at bedtime PRN Insomnia    PRESENT SYMPTOMS: [ ]Unable to self-report  [ ] CPOT [ ] PAINADs [ ] RDOS  Source if other than patient:  [ ]Family   [ ]Team     Pain: [ ]yes [ ]no  QOL impact -   Location -                    Aggravating factors -  Quality -  Radiation -  Timing-  Severity (0-10 scale):  Minimal acceptable level/pain goal (0-10 scale):     CPOT:    https://www.sccm.org/getattachment/vvc66l61-4u6d-0c8j-0q7r-3346b1946g8s/Critical-Care-Pain-Observation-Tool-(CPOT)    PAIN AD Score:   http://geriatrictoolkit.[place].[email or web address]/cog/painad.pdf (press ctrl +  left click to view)    Dyspnea:                           [ ]Mild [ ]Moderate [ ]Severe    RDOS:  0 to 2  minimal or no respiratory distress   3  mild distress  4 to 6 moderate distress  >7 severe distress  https://[email or web address].net/handouts/hen/Respiratory_Distress_Observation_Scale.pdf (Ctrl +  left click to view)     Anxiety:                             [ ]Mild [ ]Moderate [ ]Severe  Fatigue:                             [ ]Mild [ ]Moderate [ ]Severe  Nausea:                             [ ]Mild [ ]Moderate [ ]Severe  Loss of appetite:              [ ]Mild [ ]Moderate [ ]Severe  Constipation:                    [ ]Mild [ ]Moderate [ ]Severe    PCSSQ[Palliative Care Spiritual Screening Question]   Severity (0-10): deferred  Score of 4 or > indicate consideration of Chaplaincy referral.  Chaplaincy Referral: [ ] yes [ ] refused [ ] following [ ] Deferred     Caregiver [place]? : [ ] yes [ ] no [ ] Deferred [ ] Declined             Social work referral [ ] Patient & Family Centered Care Referral [ ]     Anticipatory Grief present?:  [ ] yes [ ] no  [ ] Deferred                  Social work referral [ ] Chaplaincy Referral[ ]    Other Symptoms:  [ x]All other review of systems negative   [ ] Unable to obtain ROS due to poor mental status     Palliative Performance Status Version 2:         %    http://[email or web address].org/files/news/palliative_performance_scale_ppsv2.pdf    PHYSICAL EXAM:  Vital Signs Last 24 Hrs  T(C): 36.5 (24 Apr 2024 14:30), Max: 36.7 (23 Apr 2024 21:45)  T(F): 97.7 (24 Apr 2024 14:30), Max: 98.1 (23 Apr 2024 21:45)  HR: 63 (24 Apr 2024 14:30) (59 - 63)  BP: 143/63 (24 Apr 2024 14:30) (126/69 - 148/68)  BP(mean): --  RR: 18 (24 Apr 2024 14:30) (16 - 18)  SpO2: 100% (24 Apr 2024 14:30) (98% - 100%)    Parameters below as of 24 Apr 2024 14:30  Patient On (Oxygen Delivery Method): room air     I&O's Summary    24 Apr 2024 07:01  -  24 Apr 2024 19:44  --------------------------------------------------------  IN: 980 mL / OUT: 500 mL / NET: 480 mL      GENERAL: [ ]Cachexia    [ x]Alert  [ ]Oriented x   [ ]Lethargic  [ ]Unarousable  [x ]Verbal  [ ]Non-Verbal  Behavioral:   [ ] Anxiety  [ ] Delirium [ ] Agitation [ ] Other  HEENT:  [ ]Normal   [x ]Dry mouth   [ ]ET Tube/Trach  [ ]Oral lesions  PULMONARY:   [ x]Clear [ ]Tachypnea  [ ]Audible excessive secretions   [ ]Rhonchi        [ ]Right [ ]Left [ ]Bilateral  [ ]Crackles        [ ]Right [ ]Left [ ]Bilateral  [ ]Wheezing     [ ]Right [ ]Left [ ]Bilateral  [ ]Diminished breath sounds [ ]right [ ]left [ ]bilateral  CARDIOVASCULAR:    [x ]Regular [ ]Irregular [ ]Tachy  [ ]Serjio [ ]Murmur [ ]Other  GASTROINTESTINAL:  [x ]Soft  [ ]Distended   [ ]+BS  [x ]Non tender [ ]Tender  [ ]Other [ ]PEG [ ]OGT/ NGT  Last BM:  GENITOURINARY:  [ x]Normal [ ] Incontinent   [ ]Oliguria/Anuria   [ ]Conrad  MUSCULOSKELETAL:   [ ]Normal   [ x]Weakness  [ ]Bed/Wheelchair bound [ ]Edema  NEUROLOGIC:   [ ]No focal deficits  [ x]Cognitive impairment  [ ]Dysphagia [ ]Dysarthria [ ]Paresis [ ]Other   SKIN:   [ ]Normal  [ ]Rash  [ ]Other  [ ]Pressure ulcer(s)       Present on admission [ ]y [ ]n      CRITICAL CARE:  [ ] Shock Present  [ ]Septic [ ]Cardiogenic [ ]Neurologic [ ]Hypovolemic  [ ]  Vasopressors [ ]  Inotropes   [ ]Respiratory failure present [ ]Mechanical ventilation [ ]Non-invasive ventilatory support [ ]High flow    [ ]Acute  [ ]Chronic [ ]Hypoxic  [ ]Hypercarbic [ ]Other  [ ]Other organ failure     LABS:                        10.4   6.20  )-----------( 297      ( 24 Apr 2024 06:06 )             28.1   04-24    127<L>  |  93<L>  |  9   ----------------------------<  110<H>  3.4<L>   |  20<L>  |  0.35<L>    Ca    8.9      24 Apr 2024 06:06  Phos  2.8     04-24  Mg     1.70     04-24    TPro  7.3  /  Alb  3.3  /  TBili  19.3<H>  /  DBili  x   /  AST  53<H>  /  ALT  29  /  AlkPhos  338<H>  04-24  PT/INR - ( 24 Apr 2024 06:06 )   PT: 14.9 sec;   INR: 1.33 ratio         PTT - ( 24 Apr 2024 06:06 )  PTT:34.5 sec    Urinalysis Basic - ( 24 Apr 2024 06:06 )    Color: x / Appearance: x / SG: x / pH: x  Gluc: 110 mg/dL / Ketone: x  / Bili: x / Urobili: x   Blood: x / Protein: x / Nitrite: x   Leuk Esterase: x / RBC: x / WBC x   Sq Epi: x / Non Sq Epi: x / Bacteria: x      RADIOLOGY & ADDITIONAL STUDIES:    PROTEIN CALORIE MALNUTRITION PRESENT: [ ]mild [ ]moderate [ ]severe [ ]underweight [ ]morbid obesity  https://www.andeal.org/vault/2440/web/files/ONC/Table_Clinical%20Characteristics%20to%20Document%20Malnutrition-White%20JV%20et%20al%583623.pdf    Height (cm): 172.7 (04-24-24 @ 01:35)  Weight (kg): 60.3 (04-24-24 @ 01:35)  BMI (kg/m2): 20.2 (04-24-24 @ 01:35)    [ ]PPSV2 < or = to 30% [ ]significant weight loss  [ ]poor nutritional intake  [ ]anasarca[ ]Artificial Nutrition      Other REFERRALS:  [ ]Hospice  [ ]Child Life  [ ]Social Work  [ ]Case management [ ]Holistic Therapy     Goals of Care Document:  Date of [id number] @ 19:44  HPI:  Patient is a 89 year-old female with history of HTN, HLD, [place], and peripheral vascular disease presenting to the ED for elevated LFTs over the past 3 weeks with symptoms of painless jaundice and itchiness. Patient recently returned this morning from [facility] after spending around a year outside the country. Prior to her return, she went to a doctor due to general malaise, itchiness, and just overall generally "feeling sick". She was found to have elevated LFTs and bilirubin on those labs (unclear on exact values). She then also noticed jaundice as well as itchiness. Friend at bedside reports that patient appears to have lost weight compared to before and patient states she just doesn't feel like eating as much. She otherwise denies fevers/chills, headaches, chest pain, difficulty breathing, abdominal pain, constipation/diarrhea, dysuria, recent sick contacts.     In the ED, patient vitals T 98.5F, HR 88, /91, and O2 saturation of 99% on RA with RR 18. Labs notable for elevated lipase, elevated Tbili, elevated Alk Phos and AST, and mild electrolyte abnormalities. CXR with clear lungs. CT abd/pelvis with main findings of biliary dilatation as well as pancreatic mass. Received Benadryl, potassium supplementation, and 500cc NS bolus in the ED.  (23 Apr 2024 22:50)    Palliative consulted for complex medical decision making and symptom management in the setting of serious illness. Pt with findings concerning for pancreatic cancer.    PERTINENT PM/SXH:   HTN (hypertension)    HLD (hyperlipidemia)    Peripheral vascular disease      History of myomectomy      FAMILY HISTORY:  Family history of GI malignancy      Family Hx substance abuse [ ]yes [ ]no    ITEMS NOT CHECKED ARE NOT PRESENT    SOCIAL HISTORY:   Significant other/partner[ ]  Children[x ] son E[place] Faith/Spirituality:  Substance hx:  [ ]   Tobacco hx:  [ ]   Alcohol hx: [ ]   Home Opioid hx:  [ ] I-Stop Reference No:  Living Situation: [x ]Home  [ ]Long term care  [ ]Rehab [ ]Other    ADVANCE DIRECTIVES:    DNR/MOLST  [ ]  Living Will  [ ]   DECISION MAKER(s):  [ ] Health Care Proxy(s)  [ ] Surrogate(s)  [ ] Guardian           Name(s): Phone Number(s):    BASELINE (I)ADL(s) (prior to admission):  [place]: [x ]Total  [ ] Moderate [ ]Dependent    Allergies    fish (Anaphylaxis)  No Known Drug Allergies  coffee (Unknown)    Intolerances    MEDICATIONS  (STANDING):  calamine/zinc oxide Lotion 1 Application(s) Topical daily  cefTRIAXone   IVPB 1000 milliGRAM(s) IV Intermittent every 24 hours  chlorhexidine 2% Cloths 1 Application(s) Topical <User Schedule>  cholestyramine Powder (Sugar-Free) 4 Gram(s) Oral two times a day  enoxaparin Injectable 40 milliGRAM(s) SubCutaneous every 24 hours  sodium chloride 0.9%. 500 milliLiter(s) (75 mL/Hr) IV Continuous <Continuous>  valsartan 160 milliGRAM(s) Oral daily    MEDICATIONS  (PRN):  melatonin 3 milliGRAM(s) Oral at bedtime PRN Insomnia    PRESENT SYMPTOMS: [ ]Unable to self-report  [ ] CPOT [ ] PAINADs [ ] RDOS  Source if other than patient:  [ ]Family   [ ]Team     Pain: [ ]yes [x ]no  QOL impact -   Location -                    Aggravating factors -  Quality -  Radiation -  Timing-  Severity (0-10 scale):  Minimal acceptable level/pain goal (0-10 scale):     CPOT:    https://www.sccm.org/getattachment/yll57e74-9o2j-8j1m-1f8k-3348o7370x0j/Critical-Care-Pain-Observation-Tool-(CPOT)    PAIN AD Score:   http://geriatrictoolkit.[facility]/cog/painad.pdf (press ctrl +  left click to view)    Dyspnea:                           [ ]Mild [ ]Moderate [ ]Severe    RDOS:  0 to 2  minimal or no respiratory distress   3  mild distress  4 to 6 moderate distress  >7 severe distress  https://homecareinformation.net/handouts/hen/Respiratory_Distress_Observation_Scale.pdf (Ctrl +  left click to view)     Anxiety:                             [ ]Mild [ ]Moderate [ ]Severe  Fatigue:                             [ ]Mild [ ]Moderate [ ]Severe  Nausea:                             [ ]Mild [ ]Moderate [ ]Severe  Loss of appetite:              [ ]Mild [ ]Moderate [ ]Severe  Constipation:                    [ ]Mild [ ]Moderate [ ]Severe    PCSSQ[Palliative Care Spiritual Screening Question]   Severity (0-10): deferred  Score of 4 or > indicate consideration of Chaplaincy referral.  Chaplaincy Referral: [ ] yes [ ] refused [ ] following [ ] Deferred     Caregiver [place]? : [ ] yes [ ] no [ ] Deferred [ ] Declined             Social work referral [ ] Patient & Family Centered Care Referral [ ]     Anticipatory Grief present?:  [ ] yes [ ] no  [ ] Deferred                  Social work referral [ ] Chaplaincy Referral[ ]    Other Symptoms:  [ x]All other review of systems negative   Palliative Performance Status Version 2:         %    http://npcrc.org/files/news/palliative_performance_scale_ppsv2.pdf    PHYSICAL EXAM:  Vital Signs Last 24 Hrs  T(C): 36.5 (24 Apr 2024 14:30), Max: 36.7 (23 Apr 2024 21:45)  T(F): 97.7 (24 Apr 2024 14:30), Max: 98.1 (23 Apr 2024 21:45)  HR: 63 (24 Apr 2024 14:30) (59 - 63)  BP: 143/63 (24 Apr 2024 14:30) (126/69 - 148/68)  BP(mean): --  RR: 18 (24 Apr 2024 14:30) (16 - 18)  SpO2: 100% (24 Apr 2024 14:30) (98% - 100%)    Parameters below as of 24 Apr 2024 14:30  Patient On (Oxygen Delivery Method): room air     I&O's Summary    24 Apr 2024 07:01  -  24 Apr 2024 19:44  --------------------------------------------------------  IN: 980 mL / OUT: 500 mL / NET: 480 mL      GENERAL: [ ]Cachexia    [ x]Alert  [x ]Oriented x3   [ ]Lethargic  [ ]Unarousable  [x ]Verbal  [ ]Non-Verbal  Behavioral:   [ ] Anxiety  [ ] Delirium [ ] Agitation [ ] Other  HEENT:  [ ]Normal   [x ]Dry mouth   [ ]ET Tube/Trach  [ ]Oral lesions  PULMONARY:   [ x]Clear [ ]Tachypnea  [ ]Audible excessive secretions   [ ]Rhonchi        [ ]Right [ ]Left [ ]Bilateral  [ ]Crackles        [ ]Right [ ]Left [ ]Bilateral  [ ]Wheezing     [ ]Right [ ]Left [ ]Bilateral  [ ]Diminished breath sounds [ ]right [ ]left [ ]bilateral  CARDIOVASCULAR:    [x ]Regular [ ]Irregular [ ]Tachy  [ ]Serjio [ ]Murmur [ ]Other  GASTROINTESTINAL:   [x ]Soft  [ ]Distended   [ ]+BS  [x ]Non tender [ ]Tender  [ ]Other [ ]PEG [ ]OGT/ NGT  Last BM:  GENITOURINARY:  [ x]Normal [ ] Incontinent   [ ]Oliguria/Anuria   [ ]Conrad  MUSCULOSKELETAL:   [ ]Normal   [ x]Weakness  [ ]Bed/Wheelchair bound [ ]Edema  NEUROLOGIC:   [ ]No focal deficits  [ x]Cognitive impairment  [ ]Dysphagia [ ]Dysarthria [ ]Paresis [ ]Other   SKIN: +pruritus   [ ]Normal  [ ]Rash  [ ]Other  [ ]Pressure ulcer(s)       Present on admission [ ]y [ ]n      CRITICAL CARE:  [ ] Shock Present  [ ]Septic [ ]Cardiogenic [ ]Neurologic [ ]Hypovolemic  [ ]  Vasopressors [ ]  Inotropes   [ ]Respiratory failure present [ ]Mechanical ventilation [ ]Non-invasive ventilatory support [ ]High flow    [ ]Acute  [ ]Chronic [ ]Hypoxic  [ ]Hypercarbic [ ]Other  [ ]Other organ failure     LABS:                        10.4   6.20  )-----------( 297      ( 24 Apr 2024 06:06 )             28.1   04-24    127<L>  |  93<L>  |  9   ----------------------------<  110<H>  3.4<L>   |  20<L>  |  0.35<L>    Ca    8.9      24 Apr 2024 06:06  Phos  2.8     04-24  Mg     1.70     04-24    TPro  7.3  /  Alb  3.3  /  TBili  19.3<H>  /  DBili  x   /  AST  53<H>  /  ALT  29  /  AlkPhos  338<H>  04-24  PT/INR - ( 24 Apr 2024 06:06 )   PT: 14.9 sec;   INR: 1.33 ratio         PTT - ( 24 Apr 2024 06:06 )  PTT:34.5 sec    Urinalysis Basic - ( 24 Apr 2024 06:06 )    Color: x / Appearance: x / SG: x / pH: x  Gluc: 110 mg/dL / Ketone: x  / Bili: x / Urobili: x   Blood: x / Protein: x / Nitrite: x   Leuk Esterase: x / RBC: x / WBC x   Sq Epi: x / Non Sq Epi: x / Bacteria: x      RADIOLOGY & ADDITIONAL STUDIES:  < from: CT Angio Chest PE Protocol w/ IV Cont (04.24.24 @ 18:57) >      Impression:  *  No acute pulmonary embolism in the main as well as the bilateral lobar   and segmental pulmonary arteries.  *  No focal consolidations.  *  Hypodense pancreatic lesion in the uncinate process, better   characterized on CT abdomen/pelvis from 4/23/2024  *  Lobulated bilateral thyroid lobes with multiple nodules.    < end of copied text >  < from: CT Abdomen and Pelvis w/ IV Cont (04.23.24 @ 17:36) >  FINDINGS:  LOWER CHEST: Mild bibasilar atelectasis. Aortic valve calcifications.    LIVER: Within normal limits.  BILE DUCTS: Intrahepatic and extrahepatic biliary dilation. Common bile   duct measures up to 1.4 cm.  GALLBLADDER: Cholelithiasis with a gallstone noted within the contracted   fundus. This could represent sequelae of chronic cholecystitis.  SPLEEN: Within normal limits.  PANCREAS: Poorly defined uncinate process hypodense mass measuring 1.4 cm   (304-45) versus a portion of the dilated pancreatic duct. There is also a   masslike appearance of the pancreatic head measuring approximately 3.2 x   3.4 cm (304-51) with corresponding duodenal wall thickening, though this   area enhances similar to the remainingnormal pancreatic parenchyma. The   low-attenuation mass is confined to the pancreatic parenchyma without   evidence of arterial encasement. There is approximately 90 degree contact   with the superior mesenteric vein near the portal splenic confluence and  ADRENALS: There is mild nodular thickening of the left adrenal gland   centrally. Normal right adrenal gland.  KIDNEYS/URETERS: Within normal limits.    BLADDER: There is mild thickening of the bladder base with prominence of   the proximal urethra. No discrete bladder lesion identified.  REPRODUCTIVE ORGANS: Uterus and adnexa within normal limits.    BOWEL: No bowel obstruction. Appendix is not visualized. No evidence of   inflammation in the pericecal region. Colonic diverticulosis. There is   apparent wall thickening in the right hemicolon, favored to be due to   underdistention.  PERITONEUM: No ascites.  VESSELS: Atherosclerotic changes. The right hepatic artery is replaced   off of the superior mesenteric artery.  RETROPERITONEUM/LYMPH NODES: No lymphadenopathy.  ABDOMINAL WALL: Postsurgical changes.  BONES: Degenerative changes.    < end of copied text >    < from: CT Abdomen and Pelvis w/ IV Cont (04.23.24 @ 17:36) >    IMPRESSION:  There is a very subtle approximately 1.4 cm mass within the uncinate   process of the pancreas with marked pancreatic and biliary ductal   dilatation, suspicious for adenocarcinoma. There is approximately 90   degrees abutment of the portosplenic confluence. Otherwise no significant   vascular involvement identified. Further evaluation with endoscopic   ultrasound is recommended.    There is also masslike fullness of the pancreatic head, which is   nonspecific and may simply represent normal residual pancreatic   parenchyma, there also appears to be focal wall thickening of the   adjacent duodenum. This could also be assessed with upper   endoscopy/endoscopic ultrasound recommended above.    No evidence of metastatic disease within the abdomen or pelvis.    Cholelithiasis.    There is extensive diverticulosis of the entirety of the large bowel and   a long segment of wall thickening of the right hemicolon, which could be   due to underdistention or an underlying colitis in the appropriate   clinical setting.    Extensive aortic valve leaflet calcifications are noted. Correlation for   aortic stenosis is suggested.    < end of copied text >    PROTEIN CALORIE MALNUTRITION PRESENT: [ ]mild [ ]moderate [ ]severe [ ]underweight [ ]morbid obesity  https://www.andeal.org/vault/2440/web/files/ONC/Table_Clinical%20Characteristics%20to%20Document%20Malnutrition-White%20JV%20et%20al%451131.pdf    Height (cm): 172.7 (04-24-24 @ 01:35)  Weight (kg): 60.3 (04-24-24 @ 01:35)  BMI (kg/m2): 20.2 (04-24-24 @ 01:35)    [ ]PPSV2 < or = to 30% [ ]significant weight loss  [ ]poor nutritional intake  [ ]anasarca[ ]Artificial Nutrition      Other REFERRALS:  [ ]Hospice  [ ]Child Life  [ ]Social Work  [ ]Case management [ ]Holistic Therapy     Goals of Care Document:

## 2024-04-24 NOTE — PROVIDER CONTACT NOTE (OTHER) - ASSESSMENT
pt complaining of right forearm pain; pt said it hurts 8/10; new pain pt complaining of left forearm pain; pt said it hurts 8/10; new pain

## 2024-04-24 NOTE — PROGRESS NOTE ADULT - SUBJECTIVE AND OBJECTIVE BOX
Patient is a 89y old  Female who presents with a chief complaint of Pancreatic mass, abnormal lab-work    SUBJECTIVE / OVERNIGHT EVENTS: Patient seen and examined at bedside. Admitted to medicine overnight.    MEDICATIONS  (STANDING):  cefTRIAXone   IVPB 1000 milliGRAM(s) IV Intermittent every 24 hours  cholestyramine Powder (Sugar-Free) 4 Gram(s) Oral two times a day  enoxaparin Injectable 40 milliGRAM(s) SubCutaneous every 24 hours  sodium chloride 0.9%. 500 milliLiter(s) (75 mL/Hr) IV Continuous <Continuous>  valsartan 160 milliGRAM(s) Oral daily    MEDICATIONS  (PRN):  melatonin 3 milliGRAM(s) Oral at bedtime PRN Insomnia    Vital Signs Last 24 Hrs  T(C): 36.7 (2024 05:30), Max: 36.9 (2024 13:50)  T(F): 98 (2024 05:30), Max: 98.5 (2024 13:50)  HR: 61 (2024 05:30) (59 - 88)  BP: 126/69 (2024 05:30) (126/69 - 171/85)  BP(mean): --  RR: 16 (2024 05:30) (16 - 20)  SpO2: 100% (2024 05:30) (97% - 100%)    Parameters below as of 2024 05:30  Patient On (Oxygen Delivery Method): room air    CAPILLARY BLOOD GLUCOSE    I&O's Summary    PHYSICAL EXAM:  GENERAL: no acute distress, frail  EYES: EOMI, PERRLA, +scleral icterus  ENMT: +dry oral mucosa  NECK: supple, no palpable masses  RESPIRATORY: lungs clear to ascultation b/l, unlabored respirations  CARDIOVASCULAR: regular rate and rhythm, +systolic murmur  ABDOMEN: soft, normal bowel sounds, nondistended, nontender to palpation  EXTREMITIES: no joint swelling or tenderness to palpation  PSYCH: affect appropriate  NEUROLOGY: AAOx4 ("[facility]", "hospital", "", "itching and liver tests"), CNs grossly intact  SKIN: no palpable lesions    LABS:                        10.2   5.55  )-----------( 275      ( 2024 14:16 )             27.9     04-23    129<L>  |  89<L>  |  12  ----------------------------<  187<H>  3.2<L>   |  26  |  0.47<L>    Ca    9.2      2024 14:16  Mg     1.70         TPro  7.7  /  Alb  3.5  /  TBili  21.1<H>  /  DBili  16.0<H>  /  AST  54<H>  /  ALT  32  /  AlkPhos  363<H>      Urinalysis Basic - ( 2024 01:03 )    Color: Dark Yellow / Appearance: Cloudy / S.023 / pH: x  Gluc: x / Ketone: Negative mg/dL  / Bili: Moderate / Urobili: 0.2 mg/dL   Blood: x / Protein: Negative mg/dL / Nitrite: Negative   Leuk Esterase: Small / RBC: 6 /HPF / WBC 14 /HPF   Sq Epi: x / Non Sq Epi: 0 /HPF / Bacteria: Many /HPF    RADIOLOGY & ADDITIONAL TESTS:    Imaging Personally Reviewed:    Consultant(s) Notes Reviewed:      Care Discussed with Consultants/Other Providers:    Josep Patel MD, Internal Medicine Resident Patient is a 89y old  Female who presents with a chief complaint of Pancreatic mass, abnormal lab-work    SUBJECTIVE / OVERNIGHT EVENTS: Patient seen and examined at bedside. Admitted to medicine overnight. Patient without acute complaints this AM.  Denies CP, SOB, subjective fever, chills, n/v/d.    MEDICATIONS  (STANDING):  cefTRIAXone   IVPB 1000 milliGRAM(s) IV Intermittent every 24 hours  cholestyramine Powder (Sugar-Free) 4 Gram(s) Oral two times a day  enoxaparin Injectable 40 milliGRAM(s) SubCutaneous every 24 hours  sodium chloride 0.9%. 500 milliLiter(s) (75 mL/Hr) IV Continuous <Continuous>  valsartan 160 milliGRAM(s) Oral daily    MEDICATIONS  (PRN):  melatonin 3 milliGRAM(s) Oral at bedtime PRN Insomnia    Vital Signs Last 24 Hrs  T(C): 36.7 (2024 05:30), Max: 36.9 (2024 13:50)  T(F): 98 (2024 05:30), Max: 98.5 (2024 13:50)  HR: 61 (2024 05:30) (59 - 88)  BP: 126/69 (2024 05:30) (126/69 - 171/85)  BP(mean): --  RR: 16 (2024 05:30) (16 - 20)  SpO2: 100% (2024 05:30) (97% - 100%)    Parameters below as of 2024 05:30  Patient On (Oxygen Delivery Method): room air    CAPILLARY BLOOD GLUCOSE    I&O's Summary    PHYSICAL EXAM:  GENERAL: no acute distress, frail  EYES: EOMI, PERRLA, +scleral icterus  ENMT: +dry oral mucosa  NECK: supple, no palpable masses  RESPIRATORY: lungs clear to ascultation b/l, unlabored respirations  CARDIOVASCULAR: regular rate and rhythm, +systolic murmur  ABDOMEN: soft, normal bowel sounds, nondistended, nontender to palpation  EXTREMITIES: no joint swelling or tenderness to palpation  PSYCH: affect appropriate  NEUROLOGY: AAOx4 ("[facility]", "hospital", "", "itching and liver tests"), CNs grossly intact  SKIN: no palpable lesions    LABS:                        10.2   5.55  )-----------( 275      ( 2024 14:16 )             27.9         129<L>  |  89<L>  |  12  ----------------------------<  187<H>  3.2<L>   |  26  |  0.47<L>    Ca    9.2      2024 14:16  Mg     1.70         TPro  7.7  /  Alb  3.5  /  TBili  21.1<H>  /  DBili  16.0<H>  /  AST  54<H>  /  ALT  32  /  AlkPhos  363<H>      Urinalysis Basic - ( 2024 01:03 )    Color: Dark Yellow / Appearance: Cloudy / S.023 / pH: x  Gluc: x / Ketone: Negative mg/dL  / Bili: Moderate / Urobili: 0.2 mg/dL   Blood: x / Protein: Negative mg/dL / Nitrite: Negative   Leuk Esterase: Small / RBC: 6 /HPF / WBC 14 /HPF   Sq Epi: x / Non Sq Epi: 0 /HPF / Bacteria: Many /HPF    RADIOLOGY & ADDITIONAL TESTS:    Imaging Personally Reviewed:    Consultant(s) Notes Reviewed:      Care Discussed with Consultants/Other Providers:    Josep Patel MD, Internal Medicine Resident

## 2024-04-24 NOTE — CONSULT NOTE ADULT - ASSESSMENT
# Painless jaundice c/f pancreatic mass and PD dilatation on CT  - CTAP 4/23 revealing intra/extrahep biliary dilatation with a CBD measuring up to 1.4 cm, a poorly defined unicnate process hypodense mass measuring 1.4 cm vs a portion of the dilated PD but suspicious for adenocarcinoma as well as a masslike appearance of the pancreatic head measuring approximately 3.2 x 3.4 cm corresponding with duodenal wall thickening though reporting enhances similarly t the remaining normal pancreatic parenchyma. There was mention of ~90 degree contact with the SMV near the portal splenic confluence and nodular thickening of the L adrenal gland central, cholelithiasis with possible sequelae of chronic cholecystitis, colonic diverticulosis, apparent wall thickening of the rt hemicolon thoug hfavored to be due to underdistention as per report; otherwise no evidence of mets in the abdomen or pelvis; mention of findings suggestive of aortic stenosis.      Discussed findings with patient/family and endoscopic diagnostic/therapeutic options and risks vs benefits; they would like to hold off and discuss amongst each other as well as with Oncology what potential options would be as well as GOC before proceeding with the procedure as they are not sure whether they would proceed with treatment.     Recommendations:  - Will hold off on endoscopic evaluation in accordance with patient's and family's wishes   - Palliative consult for GOC  - Oncology consult  - Please reach out to [facility] when patient/family has decided how they would like to proceed  - Continue cholestyramine for pruritis  - Daily CBC, CMP, coags  - Ensure adequate hydration  - Maintain working IV  - Serial abdominal exams  - If becomes febrile, check BCx and start Abx  - If becomes hemodynamically unstable, consult MICU and inform GI  - Rest of care per primary    Preliminary note until signed by Attending.    Thank you for involving us in this patient's care. Please reach out if any further questions or concerns.    Sahra Veloz MD  Gastroenterology/Hepatology Fellow, PGY-7    NON-URGENT CONSULTS:  Please email [email or web address]  [email or web address]  AT NIGHT AND ON WEEKENDS:  Contact on-call GI fellow via answering service ([phone]) from 5pm-8am and on weekends/holidays  MONDAY-FRIDAY 8AM-5PM:  Pager: [phone] ([facility])  Pager: [phone] ([facility])

## 2024-04-24 NOTE — DIETITIAN INITIAL EVALUATION ADULT - PROBLEM SELECTOR PLAN 1
Presents with generalized malaise, weight loss, jaundice and itchiness. Found to have pancreatic  mass on CT imaging with concern for adenocarcinoma  - CT imaging with 1.4 cm mass within uncinate process of pancreas w/ marked pancreatic and biliary ductal dilatation, suspicious for adenocarcinoma. Abuts portosplenic confluence but otherwise no vascular involvement identified. Further evaluation with endoscopic US is recommended. No evidence of metastatic disease  - also mass-like nonspecific fullness of pancreatic head  - patient denies pulmonary symptoms regarding chest involvement  - lipase elevated 371 however patient without significant pain currently. Although CT does show pancreatic nonspecific findings, will be conservative with fluids in setting of extensively calcified aortic valve as well as systolic murmur on exam and patient's current lack of abdominal symptoms. Unclear if representing pancreatitis/mass on CT.  - GOC initiated with patient and family, amenable to symptom-relief and possible biopsy, family to discuss further in AM  - GI emailed for EUS and possible biopsy, f/u recs

## 2024-04-24 NOTE — PROGRESS NOTE ADULT - PROBLEM SELECTOR PLAN 6
CT findings extensive aortic valve leaflet calcifications, systolic murmur appreciated on exam  - denies any significant change or decrease in functional status but baseline functional status is low given her age  - no significant swelling nor SOB at rest  - f/u TTE

## 2024-04-25 ENCOUNTER — RESULT REVIEW (OUTPATIENT)
Age: 89
End: 2024-04-25

## 2024-04-25 LAB
ALBUMIN SERPL ELPH-MCNC: 3.1 G/DL — LOW (ref 3.3–5)
ALP SERPL-CCNC: 345 U/L — HIGH (ref 40–120)
ALT FLD-CCNC: 29 U/L — SIGNIFICANT CHANGE UP (ref 4–33)
ANION GAP SERPL CALC-SCNC: 13 MMOL/L — SIGNIFICANT CHANGE UP (ref 7–14)
APTT BLD: 36.8 SEC — HIGH (ref 24.5–35.6)
AST SERPL-CCNC: 55 U/L — HIGH (ref 4–32)
BASOPHILS # BLD AUTO: 0.05 K/UL — SIGNIFICANT CHANGE UP (ref 0–0.2)
BASOPHILS NFR BLD AUTO: 0.9 % — SIGNIFICANT CHANGE UP (ref 0–2)
BILIRUB DIRECT SERPL-MCNC: 15.8 MG/DL — HIGH (ref 0–0.3)
BILIRUB INDIRECT FLD-MCNC: 3.2 MG/DL — HIGH (ref 0–1)
BILIRUB SERPL-MCNC: 19 MG/DL — HIGH (ref 0.2–1.2)
BUN SERPL-MCNC: 9 MG/DL — SIGNIFICANT CHANGE UP (ref 7–23)
CALCIUM SERPL-MCNC: 9.1 MG/DL — SIGNIFICANT CHANGE UP (ref 8.4–10.5)
CHLORIDE SERPL-SCNC: 96 MMOL/L — LOW (ref 98–107)
CO2 SERPL-SCNC: 20 MMOL/L — LOW (ref 22–31)
CREAT SERPL-MCNC: 0.39 MG/DL — LOW (ref 0.5–1.3)
EGFR: 93 ML/MIN/1.73M2 — SIGNIFICANT CHANGE UP
EOSINOPHIL # BLD AUTO: 0.14 K/UL — SIGNIFICANT CHANGE UP (ref 0–0.5)
EOSINOPHIL NFR BLD AUTO: 2.4 % — SIGNIFICANT CHANGE UP (ref 0–6)
GLUCOSE SERPL-MCNC: 114 MG/DL — HIGH (ref 70–99)
HCT VFR BLD CALC: 32 % — LOW (ref 34.5–45)
HCV AB S/CO SERPL IA: 0.14 S/CO — SIGNIFICANT CHANGE UP (ref 0–0.99)
HCV AB SERPL-IMP: SIGNIFICANT CHANGE UP
HGB BLD-MCNC: 10.4 G/DL — LOW (ref 11.5–15.5)
IANC: 3.51 K/UL — SIGNIFICANT CHANGE UP (ref 1.8–7.4)
IMM GRANULOCYTES NFR BLD AUTO: 0.5 % — SIGNIFICANT CHANGE UP (ref 0–0.9)
INR BLD: 1.44 RATIO — HIGH (ref 0.85–1.18)
LYMPHOCYTES # BLD AUTO: 1.52 K/UL — SIGNIFICANT CHANGE UP (ref 1–3.3)
LYMPHOCYTES # BLD AUTO: 26.2 % — SIGNIFICANT CHANGE UP (ref 13–44)
MAGNESIUM SERPL-MCNC: 1.7 MG/DL — SIGNIFICANT CHANGE UP (ref 1.6–2.6)
MCHC RBC-ENTMCNC: 30.3 PG — SIGNIFICANT CHANGE UP (ref 27–34)
MCHC RBC-ENTMCNC: 32.5 GM/DL — SIGNIFICANT CHANGE UP (ref 32–36)
MCV RBC AUTO: 81.3 FL — SIGNIFICANT CHANGE UP (ref 80–100)
MONOCYTES # BLD AUTO: 0.56 K/UL — SIGNIFICANT CHANGE UP (ref 0–0.9)
MONOCYTES NFR BLD AUTO: 9.6 % — SIGNIFICANT CHANGE UP (ref 2–14)
NEUTROPHILS # BLD AUTO: 3.51 K/UL — SIGNIFICANT CHANGE UP (ref 1.8–7.4)
NEUTROPHILS NFR BLD AUTO: 60.4 % — SIGNIFICANT CHANGE UP (ref 43–77)
NRBC # BLD: 0 /100 WBCS — SIGNIFICANT CHANGE UP (ref 0–0)
NRBC # FLD: 0 K/UL — SIGNIFICANT CHANGE UP (ref 0–0)
PHOSPHATE SERPL-MCNC: 2.7 MG/DL — SIGNIFICANT CHANGE UP (ref 2.5–4.5)
PLATELET # BLD AUTO: 316 K/UL — SIGNIFICANT CHANGE UP (ref 150–400)
POTASSIUM SERPL-MCNC: 4.4 MMOL/L — SIGNIFICANT CHANGE UP (ref 3.5–5.3)
POTASSIUM SERPL-SCNC: 4.4 MMOL/L — SIGNIFICANT CHANGE UP (ref 3.5–5.3)
PROT SERPL-MCNC: 7.4 G/DL — SIGNIFICANT CHANGE UP (ref 6–8.3)
PROTHROM AB SERPL-ACNC: 16 SEC — HIGH (ref 9.5–13)
RBC # BLD: 3.43 M/UL — LOW (ref 3.8–5.2)
RBC # FLD: 17.9 % — HIGH (ref 10.3–14.5)
SODIUM SERPL-SCNC: 129 MMOL/L — LOW (ref 135–145)
TSH SERPL-MCNC: 1.09 UIU/ML — SIGNIFICANT CHANGE UP (ref 0.27–4.2)
WBC # BLD: 5.81 K/UL — SIGNIFICANT CHANGE UP (ref 3.8–10.5)
WBC # FLD AUTO: 5.81 K/UL — SIGNIFICANT CHANGE UP (ref 3.8–10.5)

## 2024-04-25 PROCEDURE — 99232 SBSQ HOSP IP/OBS MODERATE 35: CPT | Mod: GC

## 2024-04-25 PROCEDURE — 93306 TTE W/DOPPLER COMPLETE: CPT | Mod: 26

## 2024-04-25 PROCEDURE — 99232 SBSQ HOSP IP/OBS MODERATE 35: CPT

## 2024-04-25 RX ADMIN — CHOLESTYRAMINE 4 GRAM(S): 4 POWDER, FOR SUSPENSION ORAL at 10:05

## 2024-04-25 RX ADMIN — CEFTRIAXONE 100 MILLIGRAM(S): 500 INJECTION, POWDER, FOR SOLUTION INTRAMUSCULAR; INTRAVENOUS at 02:13

## 2024-04-25 RX ADMIN — CHOLESTYRAMINE 4 GRAM(S): 4 POWDER, FOR SUSPENSION ORAL at 00:26

## 2024-04-25 RX ADMIN — ENOXAPARIN SODIUM 40 MILLIGRAM(S): 100 INJECTION SUBCUTANEOUS at 06:04

## 2024-04-25 RX ADMIN — CALAMINE AND ZINC OXIDE AND PHENOL 1 APPLICATION(S): 160; 10 LOTION TOPICAL at 11:14

## 2024-04-25 RX ADMIN — CHOLESTYRAMINE 4 GRAM(S): 4 POWDER, FOR SUSPENSION ORAL at 20:55

## 2024-04-25 RX ADMIN — CHLORHEXIDINE GLUCONATE 1 APPLICATION(S): 213 SOLUTION TOPICAL at 06:04

## 2024-04-25 RX ADMIN — VALSARTAN 160 MILLIGRAM(S): 80 TABLET ORAL at 06:03

## 2024-04-25 NOTE — PROGRESS NOTE ADULT - PROBLEM SELECTOR PLAN 3
Likely iso cholestasis given above. No rashes or lesions visualized.   - can cont with antihistamine to assess if improvement  - trial Cholestyramine BID  - Limit benadryl due to sedating effects Likely iso cholestasis given above. No rashes or lesions visualized.   - can cont with antihistamine to assess if improvement  - trial Cholestyramine BID  - Limit benadryl due to sedating effects  - calamine lotion

## 2024-04-25 NOTE — PROGRESS NOTE ADULT - ASSESSMENT
89 year-old female with history of HTN, HLD, [place], and peripheral vascular disease presenting to the ED for elevated LFTs over the past 3 weeks with symptoms of painless jaundice and itchiness. Patient recently returned this morning from [facility] after [place] the country.  Labs notable for elevated lipase, elevated Tbili, elevated Alk Phos and AST, and mild electrolyte abnormalities. CT abd/pelvis with main findings of biliary dilatation as well as pancreatic mass. Palliative consulted for complex medical decision making and symptom management in the setting of serious illness. Pt with findings concerning for pancreatic cancer.

## 2024-04-25 NOTE — PROGRESS NOTE ADULT - PROBLEM SELECTOR PLAN 4
[facility] 4/24: Pt was lethargic after benadryl during our initial conversation. per conversation with pt's niece marco and son Ganesh, family would like to hear options before making decisions regarding biopsy, treatments etc as they shared that patient's qol important and she would not want aggressive measures. We encouraged them to speak to oncology and shared that if patient does not wish to undergo diagnostic workup or treatment than home hospice service would be appropriate. Discussed that EGD and stenting likely would be beneficial for symptoms relief.     4/25: Pt confirmed that at this time she's amenable to GI stenting for symptoms relief and that she likely would not want treatment for presumed cancer.    MOLST completed by primary team 4/23: DNR/Intubate -- discussed with nikaitlyn and son recommending against intubation as patient doesn't want invasive procedures however they prefer to speak to her further before changing anything.  Updated molst today to include no blood transfusion as pt is Jehova's witness.    Surrogates: Raul Andersen Small [phone]; niece: Marco who helps with decisions -- [phone]

## 2024-04-25 NOTE — PROGRESS NOTE ADULT - PROBLEM SELECTOR PLAN 2
Presents with pruritis, jaundice, and elevated Bilirubin, Alk Phos, and AST, likely in setting of obstruction/compression by pancreatic mass  - CT imaging findings as above  - R factor 0.3, cholestatic  - No leukocytosis, no abdominal pain nor tenderness, no fevers/chills lately, no diarrhea nor signs/symptoms of possible colitis mentioned on CT  - sequelae of chronic cholecystitis likely iso above CT findings  - hepatitis panel negative   - f/u GI recs regarding possible stent

## 2024-04-25 NOTE — PROGRESS NOTE ADULT - PROBLEM SELECTOR PLAN 5
Thank you for allowing us to participate in your patient's care. We will continue to follow with you. Please page [phone] for any q's or c's. The Geriatric and Palliative Medicine service has coverage 24 hours a day/ 7 days a week to provide medical recommendations regarding symptom management needs via telephone.    Paola Ellis MD  Palliative Medicine

## 2024-04-25 NOTE — PROGRESS NOTE ADULT - PROBLEM SELECTOR PLAN 3
likely 2/2 cholestasis   Currently on cholestyramine bid and calamine lotion  benadryl with sedating effect  Likely would benefit with biliary stenting  RECOMMEND:  For pharmacologic options can consider mirtazapine 7.5 mg qhs

## 2024-04-25 NOTE — PROGRESS NOTE ADULT - PROBLEM SELECTOR PLAN 1
Presents with generalized malaise, weight loss, jaundice and itchiness. Found to have pancreatic mass on CT imaging with concern for adenocarcinoma  - CT imaging with 1.4 cm mass within uncinate process of pancreas w/ marked pancreatic and biliary ductal dilatation, suspicious for adenocarcinoma. Abuts portosplenic confluence but otherwise no vascular involvement identified. Further evaluation with endoscopic US is recommended. No evidence of metastatic disease  - also mass-like nonspecific fullness of pancreatic head  - patient denies pulmonary symptoms regarding chest involvement  - lipase elevated 371 however patient without significant pain currently. Although CT does show pancreatic nonspecific findings, will be conservative with fluids in setting of extensively calcified aortic valve as well as systolic murmur on exam and patient's current lack of abdominal symptoms. Unclear if representing pancreatitis/mass on CT.  - GOC initiated with patient and family, amenable to symptom-relief and possible biopsy, pt does not want treatment for cancer   - GI emailed for EUS and possible biopsy, will f/u regarding possible palliative stent for symptom relief

## 2024-04-25 NOTE — PROGRESS NOTE ADULT - PROBLEM SELECTOR PLAN 2
Pt with it elevated bilirubin >20  CT:  Intrahepatic and extrahepatic biliary dilation. Common bile duct measures up to 1.4 cm.  GI consulted, discussed option for EGD/stenting/biopsy  Pt now amenable to endoscopic stenting for symptoms relief; appreciate gi evaluation, plan for EGD this admission pending cardiac clearance

## 2024-04-25 NOTE — PROGRESS NOTE ADULT - PROBLEM SELECTOR PLAN 8
DVT ppx: Lovenox  Diet: DASH/TLC, no coffee/fish  PT c/s    GOC: DNR/intubate per discussion overnight (unclear if time of night affected discussion). Was DNR/DNI per family members. At this time family does not wish to change MOLST until speaking more w/ patient

## 2024-04-25 NOTE — PROGRESS NOTE ADULT - SUBJECTIVE AND OBJECTIVE BOX
[facility] Palliative [facility]  Paola Ellis MD, Palliative Care Attending  Contact Info: Page [phone] (including Nights/Weekends), message on Microsoft Teams (Paola Ellis MD), or leave VM at Palliative Office [phone] (non-urgent)   Date of [id number] @ 19:11    SUBJECTIVE AND OBJECTIVE:  Patient seen this morning at bedside. They are accompanied by their son Ganesh  Pt denies any pain, mainly endorsing itching   Reviewed her understanding of plan of care and she shared that in meeting with oncology and gi team, she is now amenable to endoscopic stent placement for relief of symptoms and that she likely will not undergo treatment for presumed cancer    Indication for Geriatrics and Palliative Care Services/INTERVAL HPI: goals of care     OVERNIGHT EVENTS:  no acute events    DNR on chart:Intubate  Intubate      Allergies    fish (Anaphylaxis)  No Known Drug Allergies  coffee (Unknown)    Intolerances    MEDICATIONS  (STANDING):  calamine/zinc oxide Lotion 1 Application(s) Topical daily  cefTRIAXone   IVPB 1000 milliGRAM(s) IV Intermittent every 24 hours  chlorhexidine 2% Cloths 1 Application(s) Topical <User Schedule>  cholestyramine Powder (Sugar-Free) 4 Gram(s) Oral two times a day  enoxaparin Injectable 40 milliGRAM(s) SubCutaneous every 24 hours  sodium chloride 0.9%. 500 milliLiter(s) (75 mL/Hr) IV Continuous <Continuous>  valsartan 160 milliGRAM(s) Oral daily    MEDICATIONS  (PRN):  melatonin 3 milliGRAM(s) Oral at bedtime PRN Insomnia      ITEMS UNCHECKED ARE NOT PRESENT    PRESENT SYMPTOMS: [ ]Unable to self-report - see [ ] CPOT [ ] PAINADS [ ] RDOS  Source if other than patient:  [ ]Family   [ ]Team     Pain:  [ ]yes [x ]no  QOL impact -   Location -                    Aggravating factors -  Quality -  Radiation -  Timing-  Severity (0-10 scale):  Minimal acceptable level/ pain goal (0-10 scale):     CPOT:    https://www.sccm.org/getattachment/iwc49r89-8w6o-8v6v-3b3j-0450o1390u6c/Critical-Care-Pain-Observation-Tool-(CPOT)    Dyspnea:                           [ ]Mild [ ]Moderate [ ]Severe  Anxiety:                             [ ]Mild [ ]Moderate [ ]Severe  Fatigue:                             [ ]Mild [ ]Moderate [ ]Severe  Nausea:                             [ ]Mild [ ]Moderate [ ]Severe  Loss of appetite:              [ x]Mild [ ]Moderate [ ]Severe  Constipation:                    [ ]Mild [ ]Moderate [ ]Severe  Other Symptoms:  [x ]All other review of systems negative     PCSSQ[Palliative Care Spiritual Screening Question]   Severity (0-10):  Score of 4 or > indicate consideration of Chaplaincy referral.  Chaplaincy Referral: [ ] yes [ ] refused [ ] following [ ] deferred    Caregiver [place]? : [ ] yes [ ] no [ ] Deferred [ ] Declined             Social work referral [ ] Patient & Family Centered Care Referral [ ]  Anticipatory Grief present?:  [ ] yes [ ] no  [ ] Deferred                  Social work referral [ ] Patient & Family Centered Care Referral [ ]      PHYSICAL EXAM:  Vital Signs Last 24 Hrs  T(C): 36.4 (25 Apr 2024 14:39), Max: 36.6 (25 Apr 2024 05:21)  T(F): 97.5 (25 Apr 2024 14:39), Max: 97.8 (25 Apr 2024 05:21)  HR: 77 (25 Apr 2024 14:39) (60 - 77)  BP: 151/81 (25 Apr 2024 14:39) (118/60 - 151/81)  BP(mean): --  RR: 18 (25 Apr 2024 14:39) (18 - 18)  SpO2: 100% (25 Apr 2024 14:39) (100% - 100%)    Parameters below as of 25 Apr 2024 14:39  Patient On (Oxygen Delivery Method): room air     I&O's Summary    24 Apr 2024 07:01  -  25 Apr 2024 07:00  --------------------------------------------------------  IN: 980 mL / OUT: 500 mL / NET: 480 mL    25 Apr 2024 07:01  -  25 Apr 2024 19:11  --------------------------------------------------------  IN: 1080 mL / OUT: 600 mL / NET: 480 mL         General: alert, NAD  HEENT: +scleral icterus; face relaxed  Chest: symmetric excursion, no accessory muscle use  Heart: peripheral pulse 2+ and regular  Lungs: no dyspnea with speech  Abdomen: soft, NT, ND  Ext: no cyanosis, clubbing, visible veins, ulcers, wounds  Neuro: alert, coherent speech, grossly non-focal  Psych: calm, rational, linear thought process  Skin: ]Normal  [ ]Rash  [x ]Other - itching  [ ]Pressure ulcer(s) [ ]y [ ]n present on admission    CRITICAL CARE:  [ ]Shock Present  [ ]Septic [ ]Cardiogenic [ ]Neurologic [ ]Hypovolemic  [ ]Vasopressors [ ]Inotropes  [ ]Respiratory failure present [ ]Mechanical Ventilation [ ]Non-invasive ventilatory support [ ]High-Flow   [ ]Acute  [ ]Chronic [ ]Hypoxic  [ ]Hypercarbic [ ]Other  [ ]Other organ failure     LABS:                        10.4   5.81  )-----------( 316      ( 25 Apr 2024 05:40 )             32.0   04-25    129<L>  |  96<L>  |  9   ----------------------------<  114<H>  4.4   |  20<L>  |  0.39<L>    Ca    9.1      25 Apr 2024 05:40  Phos  2.7     04-25  Mg     1.70     04-25    TPro  7.4  /  Alb  3.1<L>  /  TBili  19.0<H>  /  DBili  15.8<H>  /  AST  55<H>  /  ALT  29  /  AlkPhos  345<H>  04-25  PT/INR - ( 25 Apr 2024 05:40 )   PT: 16.0 sec;   INR: 1.44 ratio         PTT - ( 25 Apr 2024 05:40 )  PTT:36.8 sec    Urinalysis Basic - ( 25 Apr 2024 05:40 )    Color: x / Appearance: x / SG: x / pH: x  Gluc: 114 mg/dL / Ketone: x  / Bili: x / Urobili: x   Blood: x / Protein: x / Nitrite: x   Leuk Esterase: x / RBC: x / WBC x   Sq Epi: x / Non Sq Epi: x / Bacteria: x      RADIOLOGY & ADDITIONAL STUDIES:  < from: CT Angio Chest PE Protocol w/ IV Cont (04.24.24 @ 18:57) >    IMPRESSION:  No central pulmonary embolus.    No evidence of metastatic disease within the chest.    < end of copied text >    Protein Calorie Malnutrition Present: [ ]mild [ ]moderate [ ]severe [ ]underweight [ ]morbid obesity  https://www.andeal.org/vault/2440/web/files/ONC/Table_Clinical%20Characteristics%20to%20Document%20Malnutrition-White%20JV%20et%20al%014805.pdf    Height (cm): 172.7 (04-24-24 @ 01:35)  Weight (kg): 60.3 (04-24-24 @ 01:35)  BMI (kg/m2): 20.2 (04-24-24 @ 01:35)    [ ]PPSV2 < or = 30%  [ ]significant weight loss [ ]poor nutritional intake [ ]anasarca[ ]Artificial Nutrition    Other REFERRALS:  [ ]Hospice  [ ]Child Life  [ ]Social Work  [ ]Case management [ ]Holistic Therapy     Goals of Care Document:

## 2024-04-25 NOTE — MEDICAL STUDENT PROGRESS NOTE(EDUCATION) - ASSESSMENT
89F with PMH of HTN, HLD, [place], and peripheral vascular disease presenting to the ED for elevated LFTs over the past 3 weeks with symptoms of painless jaundice and itchiness, found to have elevated Bilirubin and Alk Phos with CT findings of CBD dilatation iso pancreatic mass, admitted for further work-up and management.       Problem/Plan - 1:  ·  Problem: Pancreatic mass.   ·  Plan: Presents with generalized malaise, weight loss, jaundice and itchiness. Found to have pancreatic mass on CT imaging with concern for adenocarcinoma  - CT imaging with 1.4 cm mass within uncinate process of pancreas w/ marked pancreatic and biliary ductal dilatation, suspicious for adenocarcinoma. Abuts portosplenic confluence but otherwise no vascular involvement identified. Further evaluation with endoscopic US is recommended. No evidence of metastatic disease  - also mass-like nonspecific fullness of pancreatic head  - patient denies pulmonary symptoms regarding chest involvement  - lipase elevated 371 however patient without significant pain currently. Although CT does show pancreatic nonspecific findings, will be conservative with fluids in setting of extensively calcified aortic valve as well as systolic murmur on exam and patient's current lack of abdominal symptoms. Unclear if representing pancreatitis/mass on CT.  - GOC initiated with patient and family, amenable to symptom-relief and possible biopsy, pt does not want treatment for cancer  - Per palliative care, patient appropriate for hospice following discharge   - GI emailed for EUS and possible biopsy, will f/u regarding possible palliative stent for symptom relief.     Problem/Plan - 2:  ·  Problem: Cholestasis.   ·  Plan: Presents with pruritis, jaundice, and elevated Bilirubin, Alk Phos, and AST, likely in setting of obstruction/compression by pancreatic mass  - CT imaging findings as above  - R factor 0.3, cholestatic  - No leukocytosis, no abdominal pain nor tenderness, no fevers/chills lately, no diarrhea nor signs/symptoms of possible colitis mentioned on CT  - sequelae of chronic cholecystitis likely iso above CT findings  - hepatitis panel negative   - f/u GI recs regarding possible stent.     Problem/Plan - 3:  ·  Problem: Pruritus.   ·  Plan: Likely iso cholestasis given above. No rashes or lesions visualized.   - can cont with antihistamine to assess if improvement  - trial Cholestyramine BID  - Limit benadryl due to sedating effects  - calamine lotion.     Problem/Plan - 4:  ·  Problem: UTI (urinary tract infection).   ·  Plan: UA with WBC and many bacteria. Endorses burning with urination  - CT abd/pelvis with findings of mild thickening of the bladder base with prominence of the proximal urethra  - Ceftriaxone for simple cystitis  - f/u UCx, tailor antibiotics.     Problem/Plan - 5:  ·  Problem: Hypokalemia.   ·  Plan: K 3.2 on admission as well as mild hyponatremia on admission, likely iso poor PO intake given poor appetite and weight loss  - cont to trend electrolytes and replete PRN.     Problem/Plan - 6:  ·  Problem: Aortic stenosis.   ·  Plan: CT findings extensive aortic valve leaflet calcifications, systolic murmur appreciated on exam  - denies any significant change or decrease in functional status but baseline functional status is low given her age  - no significant swelling nor SOB at rest  - f/u TTE  - likely aortic stenosis   - may require cardiology c/s pending GI recs re palliative stent.     Problem/Plan - 7:  ·  Problem: Hypertension.   ·  Plan: On Diovan and Natrilix at home  - cont with home valsartan.     Problem/Plan - 8:  ·  Problem: Need for prophylactic measure.   ·  Plan: DVT ppx: Lovenox  Diet: DASH/TLC, no coffee/fish  PT c/s    GOC: DNR/intubate per discussion overnight (unclear if time of night affected discussion). Was DNR/DNI per family members. At this time family does not wish to change MOLST until speaking more w/ patient.

## 2024-04-25 NOTE — CHART NOTE - NSCHARTNOTEFT_GEN_A_CORE
Brief Update Note    Contacted by primary who stated that patient/family leaning towards hospice, but would like symptom control first and palliative stenting, but do not wish to have biopsies taken. Also reporting patient has AS, likely severe.    Will tentatively plan for non-urgent ERCP/biliary stent placement Friday vs early next week, pending scheduling and cardiac clearance.  Please check echo and consult cardiology for cardiac clearance.  3 AM CBC, CMP, coags; correct electrolytes via IV.  Transfuse if Hgb < 8  Ensure has working IV access by 6 AM  NPO after midnight  Ensure adequate hydration  See prior note for full recs    Thank you for involving us in this patient's care. Please reach out if any further questions or concerns.    Sahra Veloz MD  Gastroenterology/Hepatology Fellow, PGY-7    NON-URGENT CONSULTS:  Please email [email or web address]  AT NIGHT AND ON WEEKENDS:  Contact on-call GI fellow via answering service ([phone]) from 5pm-8am and on weekends/holidays  MONDAY-FRIDAY 8AM-5PM:  Pager: [phone] ([facility])  Pager: [phone] ([place])

## 2024-04-25 NOTE — PROGRESS NOTE ADULT - ASSESSMENT
89F with PMH of HTN, HLD, [place], and peripheral vascular disease presenting to the ED for elevated LFTs over the past 3 weeks with symptoms of painless jaundice and itchiness, found to have elevated Bilirubin and Alk Phos with CT findings of CBD dilatation iso pancreatic mass, admitted for further work-up and management.

## 2024-04-25 NOTE — PROGRESS NOTE ADULT - SUBJECTIVE AND OBJECTIVE BOX
Alexus Christie MD   Internal Medicine, PGY 2  Contact via TEAMS.     SUBJECTIVE / OVERNIGHT EVENTS:  - Pt seen and examined at bedside  - LAUREN    MEDICATIONS  (STANDING):  calamine/zinc oxide Lotion 1 Application(s) Topical daily  cefTRIAXone   IVPB 1000 milliGRAM(s) IV Intermittent every 24 hours  chlorhexidine 2% Cloths 1 Application(s) Topical <User Schedule>  cholestyramine Powder (Sugar-Free) 4 Gram(s) Oral two times a day  enoxaparin Injectable 40 milliGRAM(s) SubCutaneous every 24 hours  sodium chloride 0.9%. 500 milliLiter(s) (75 mL/Hr) IV Continuous <Continuous>  valsartan 160 milliGRAM(s) Oral daily    MEDICATIONS  (PRN):  melatonin 3 milliGRAM(s) Oral at bedtime PRN Insomnia        04-24-24 @ 07:01  -  04-25-24 @ 07:00  --------------------------------------------------------  IN: 980 mL / OUT: 500 mL / NET: 480 mL        PHYSICAL EXAM:  Vital Signs Last 24 Hrs  T(C): 36.6 (25 Apr 2024 05:21), Max: 36.6 (25 Apr 2024 05:21)  T(F): 97.8 (25 Apr 2024 05:21), Max: 97.8 (25 Apr 2024 05:21)  HR: 60 (25 Apr 2024 05:21) (60 - 63)  BP: 118/60 (25 Apr 2024 05:21) (118/60 - 143/63)  BP(mean): --  RR: 18 (25 Apr 2024 05:21) (18 - 18)  SpO2: 100% (25 Apr 2024 05:21) (100% - 100%)    Parameters below as of 25 Apr 2024 05:21  Patient On (Oxygen Delivery Method): room air        CAPILLARY BLOOD GLUCOSE        I&O's Summary    24 Apr 2024 07:01  -  25 Apr 2024 07:00  --------------------------------------------------------  IN: 980 mL / OUT: 500 mL / NET: 480 mL        CONSTITUTIONAL: NAD  HEENT: NC/AT  RESPIRATORY: Normal respiratory effort; lungs are clear to auscultation bilaterally  CARDIOVASCULAR: Regular rate and rhythm, normal S1 and S2, no murmur/rub/gallop; No lower extremity edema; Peripheral pulses are 2+ bilaterally  ABDOMEN: Nontender to palpation, normoactive bowel sounds, no rebound/guarding; No hepatosplenomegaly  MUSCLOSKELETAL: no clubbing or cyanosis of digits; no joint swelling or tenderness to palpation  EXTREMITIES: no peripheral edema, distal pulses intact   NEURO: no focal deficits   PSYCH: A+O to person, place, and time; affect appropriate    LABS:                        10.   6.20  )-----------( 297      ( 24 Apr 2024 06:06 )             28.1     04-24    127<L>  |  93<L>  |  9   ----------------------------<  110<H>  3.4<L>   |  20<L>  |  0.35<L>    Ca    8.9      24 Apr 2024 06:06  Phos  2.8     04-24  Mg     1.70     04-24    TPro  7.3  /  Alb  3.3  /  TBili  19.3<H>  /  DBili  x   /  AST  53<H>  /  ALT  29  /  AlkPhos  338<H>  04-24    PT/INR - ( 24 Apr 2024 06:06 )   PT: 14.9 sec;   INR: 1.33 ratio         PTT - ( 24 Apr 2024 06:06 )  PTT:34.5 sec      Urinalysis Basic - ( 24 Apr 2024 06:06 )    Color: x / Appearance: x / SG: x / pH: x  Gluc: 110 mg/dL / Ketone: x  / Bili: x / Urobili: x   Blood: x / Protein: x / Nitrite: x   Leuk Esterase: x / RBC: x / WBC x   Sq Epi: x / Non Sq Epi: x / Bacteria: x          IMAGING:    [X] All pertinent imaging reviewed by me

## 2024-04-25 NOTE — PROGRESS NOTE ADULT - PROBLEM SELECTOR PLAN 6
CT findings extensive aortic valve leaflet calcifications, systolic murmur appreciated on exam  - denies any significant change or decrease in functional status but baseline functional status is low given her age  - no significant swelling nor SOB at rest  - f/u TTE  - likely aortic stenosis   - may require cardiology c/s pending GI recs re palliative stent

## 2024-04-26 DIAGNOSIS — E87.1 HYPO-OSMOLALITY AND HYPONATREMIA: ICD-10-CM

## 2024-04-26 DIAGNOSIS — E87.8 OTHER DISORDERS OF ELECTROLYTE AND FLUID BALANCE, NOT ELSEWHERE CLASSIFIED: ICD-10-CM

## 2024-04-26 DIAGNOSIS — D64.9 ANEMIA, UNSPECIFIED: ICD-10-CM

## 2024-04-26 LAB
ADD ON TEST-SPECIMEN IN LAB: SIGNIFICANT CHANGE UP
ALBUMIN SERPL ELPH-MCNC: 3 G/DL — LOW (ref 3.3–5)
ALP SERPL-CCNC: 337 U/L — HIGH (ref 40–120)
ALT FLD-CCNC: 33 U/L — SIGNIFICANT CHANGE UP (ref 4–33)
ANION GAP SERPL CALC-SCNC: 12 MMOL/L — SIGNIFICANT CHANGE UP (ref 7–14)
ANION GAP SERPL CALC-SCNC: 13 MMOL/L — SIGNIFICANT CHANGE UP (ref 7–14)
ANION GAP SERPL CALC-SCNC: 14 MMOL/L — SIGNIFICANT CHANGE UP (ref 7–14)
APTT BLD: 39 SEC — HIGH (ref 24.5–35.6)
AST SERPL-CCNC: 57 U/L — HIGH (ref 4–32)
BASOPHILS # BLD AUTO: 0.03 K/UL — SIGNIFICANT CHANGE UP (ref 0–0.2)
BASOPHILS NFR BLD AUTO: 0.5 % — SIGNIFICANT CHANGE UP (ref 0–2)
BILIRUB DIRECT SERPL-MCNC: 15.6 MG/DL — HIGH (ref 0–0.3)
BILIRUB INDIRECT FLD-MCNC: 3.3 MG/DL — HIGH (ref 0–1)
BILIRUB SERPL-MCNC: 18.9 MG/DL — HIGH (ref 0.2–1.2)
BLD GP AB SCN SERPL QL: NEGATIVE — SIGNIFICANT CHANGE UP
BUN SERPL-MCNC: 10 MG/DL — SIGNIFICANT CHANGE UP (ref 7–23)
BUN SERPL-MCNC: 8 MG/DL — SIGNIFICANT CHANGE UP (ref 7–23)
BUN SERPL-MCNC: 9 MG/DL — SIGNIFICANT CHANGE UP (ref 7–23)
CALCIUM SERPL-MCNC: 8.9 MG/DL — SIGNIFICANT CHANGE UP (ref 8.4–10.5)
CALCIUM SERPL-MCNC: 9.1 MG/DL — SIGNIFICANT CHANGE UP (ref 8.4–10.5)
CALCIUM SERPL-MCNC: 9.1 MG/DL — SIGNIFICANT CHANGE UP (ref 8.4–10.5)
CHLORIDE SERPL-SCNC: 92 MMOL/L — LOW (ref 98–107)
CHLORIDE SERPL-SCNC: 93 MMOL/L — LOW (ref 98–107)
CHLORIDE SERPL-SCNC: 95 MMOL/L — LOW (ref 98–107)
CO2 SERPL-SCNC: 18 MMOL/L — LOW (ref 22–31)
CO2 SERPL-SCNC: 19 MMOL/L — LOW (ref 22–31)
CO2 SERPL-SCNC: 20 MMOL/L — LOW (ref 22–31)
CREAT SERPL-MCNC: 0.33 MG/DL — LOW (ref 0.5–1.3)
CREAT SERPL-MCNC: 0.35 MG/DL — LOW (ref 0.5–1.3)
CREAT SERPL-MCNC: 0.41 MG/DL — LOW (ref 0.5–1.3)
EGFR: 92 ML/MIN/1.73M2 — SIGNIFICANT CHANGE UP
EGFR: 96 ML/MIN/1.73M2 — SIGNIFICANT CHANGE UP
EGFR: 97 ML/MIN/1.73M2 — SIGNIFICANT CHANGE UP
EOSINOPHIL # BLD AUTO: 0.16 K/UL — SIGNIFICANT CHANGE UP (ref 0–0.5)
EOSINOPHIL NFR BLD AUTO: 2.7 % — SIGNIFICANT CHANGE UP (ref 0–6)
GLUCOSE SERPL-MCNC: 118 MG/DL — HIGH (ref 70–99)
GLUCOSE SERPL-MCNC: 126 MG/DL — HIGH (ref 70–99)
GLUCOSE SERPL-MCNC: 160 MG/DL — HIGH (ref 70–99)
HCT VFR BLD CALC: 27 % — LOW (ref 34.5–45)
HCT VFR BLD CALC: 34 % — LOW (ref 34.5–45)
HGB BLD-MCNC: 10.3 G/DL — LOW (ref 11.5–15.5)
HGB BLD-MCNC: 8.8 G/DL — LOW (ref 11.5–15.5)
IANC: 3.71 K/UL — SIGNIFICANT CHANGE UP (ref 1.8–7.4)
IMM GRANULOCYTES NFR BLD AUTO: 0.5 % — SIGNIFICANT CHANGE UP (ref 0–0.9)
INR BLD: 1.43 RATIO — HIGH (ref 0.85–1.18)
LYMPHOCYTES # BLD AUTO: 1.46 K/UL — SIGNIFICANT CHANGE UP (ref 1–3.3)
LYMPHOCYTES # BLD AUTO: 25.1 % — SIGNIFICANT CHANGE UP (ref 13–44)
MAGNESIUM SERPL-MCNC: 1.6 MG/DL — SIGNIFICANT CHANGE UP (ref 1.6–2.6)
MCHC RBC-ENTMCNC: 26.7 PG — LOW (ref 27–34)
MCHC RBC-ENTMCNC: 30.3 GM/DL — LOW (ref 32–36)
MCHC RBC-ENTMCNC: 32.5 GM/DL — SIGNIFICANT CHANGE UP (ref 32–36)
MCHC RBC-ENTMCNC: 33.5 PG — SIGNIFICANT CHANGE UP (ref 27–34)
MCV RBC AUTO: 100 FL — SIGNIFICANT CHANGE UP (ref 80–100)
MCV RBC AUTO: 79.7 FL — LOW (ref 80–100)
MONOCYTES # BLD AUTO: 0.43 K/UL — SIGNIFICANT CHANGE UP (ref 0–0.9)
MONOCYTES NFR BLD AUTO: 7.4 % — SIGNIFICANT CHANGE UP (ref 2–14)
NEUTROPHILS # BLD AUTO: 3.71 K/UL — SIGNIFICANT CHANGE UP (ref 1.8–7.4)
NEUTROPHILS NFR BLD AUTO: 63.8 % — SIGNIFICANT CHANGE UP (ref 43–77)
NRBC # BLD: 0 /100 WBCS — SIGNIFICANT CHANGE UP (ref 0–0)
NRBC # BLD: 0 /100 WBCS — SIGNIFICANT CHANGE UP (ref 0–0)
NRBC # FLD: 0 K/UL — SIGNIFICANT CHANGE UP (ref 0–0)
NRBC # FLD: 0 K/UL — SIGNIFICANT CHANGE UP (ref 0–0)
OSMOLALITY SERPL: 266 MOSM/KG — LOW (ref 275–295)
OSMOLALITY UR: 637 MOSM/KG — SIGNIFICANT CHANGE UP (ref 50–1200)
PHOSPHATE SERPL-MCNC: 2.8 MG/DL — SIGNIFICANT CHANGE UP (ref 2.5–4.5)
PLATELET # BLD AUTO: 265 K/UL — SIGNIFICANT CHANGE UP (ref 150–400)
PLATELET # BLD AUTO: 286 K/UL — SIGNIFICANT CHANGE UP (ref 150–400)
POTASSIUM SERPL-MCNC: 3.4 MMOL/L — LOW (ref 3.5–5.3)
POTASSIUM SERPL-MCNC: 3.5 MMOL/L — SIGNIFICANT CHANGE UP (ref 3.5–5.3)
POTASSIUM SERPL-MCNC: 4.2 MMOL/L — SIGNIFICANT CHANGE UP (ref 3.5–5.3)
POTASSIUM SERPL-SCNC: 3.4 MMOL/L — LOW (ref 3.5–5.3)
POTASSIUM SERPL-SCNC: 3.5 MMOL/L — SIGNIFICANT CHANGE UP (ref 3.5–5.3)
POTASSIUM SERPL-SCNC: 4.2 MMOL/L — SIGNIFICANT CHANGE UP (ref 3.5–5.3)
PROT SERPL-MCNC: 7.2 G/DL — SIGNIFICANT CHANGE UP (ref 6–8.3)
PROTHROM AB SERPL-ACNC: 16 SEC — HIGH (ref 9.5–13)
RBC # BLD: 3.3 M/UL — LOW (ref 3.8–5.2)
RBC # BLD: 3.4 M/UL — LOW (ref 3.8–5.2)
RBC # FLD: 17.6 % — HIGH (ref 10.3–14.5)
RBC # FLD: 17.6 % — HIGH (ref 10.3–14.5)
RH IG SCN BLD-IMP: POSITIVE — SIGNIFICANT CHANGE UP
SODIUM SERPL-SCNC: 123 MMOL/L — LOW (ref 135–145)
SODIUM SERPL-SCNC: 125 MMOL/L — LOW (ref 135–145)
SODIUM SERPL-SCNC: 128 MMOL/L — LOW (ref 135–145)
SODIUM UR-SCNC: 147 MMOL/L — SIGNIFICANT CHANGE UP
TSH SERPL-MCNC: 1.26 UIU/ML — SIGNIFICANT CHANGE UP (ref 0.27–4.2)
WBC # BLD: 5.82 K/UL — SIGNIFICANT CHANGE UP (ref 3.8–10.5)
WBC # BLD: 5.97 K/UL — SIGNIFICANT CHANGE UP (ref 3.8–10.5)
WBC # FLD AUTO: 5.82 K/UL — SIGNIFICANT CHANGE UP (ref 3.8–10.5)
WBC # FLD AUTO: 5.97 K/UL — SIGNIFICANT CHANGE UP (ref 3.8–10.5)

## 2024-04-26 PROCEDURE — 99232 SBSQ HOSP IP/OBS MODERATE 35: CPT | Mod: GC

## 2024-04-26 PROCEDURE — 93010 ELECTROCARDIOGRAM REPORT: CPT

## 2024-04-26 RX ORDER — SODIUM CHLORIDE 9 MG/ML
1000 INJECTION INTRAMUSCULAR; INTRAVENOUS; SUBCUTANEOUS
Refills: 0 | Status: DISCONTINUED | OUTPATIENT
Start: 2024-04-26 | End: 2024-04-29

## 2024-04-26 RX ORDER — THIAMINE MONONITRATE (VIT B1) 100 MG
100 TABLET ORAL DAILY
Refills: 0 | Status: DISCONTINUED | OUTPATIENT
Start: 2024-04-26 | End: 2024-05-02

## 2024-04-26 RX ORDER — MAGNESIUM SULFATE 500 MG/ML
2 VIAL (ML) INJECTION ONCE
Refills: 0 | Status: COMPLETED | OUTPATIENT
Start: 2024-04-26 | End: 2024-04-26

## 2024-04-26 RX ORDER — SODIUM CHLORIDE 9 MG/ML
1 INJECTION INTRAMUSCULAR; INTRAVENOUS; SUBCUTANEOUS THREE TIMES A DAY
Refills: 0 | Status: DISCONTINUED | OUTPATIENT
Start: 2024-04-26 | End: 2024-04-28

## 2024-04-26 RX ORDER — PREGABALIN 225 MG/1
1000 CAPSULE ORAL DAILY
Refills: 0 | Status: DISCONTINUED | OUTPATIENT
Start: 2024-04-26 | End: 2024-05-02

## 2024-04-26 RX ORDER — FOLIC ACID 0.8 MG
1 TABLET ORAL DAILY
Refills: 0 | Status: DISCONTINUED | OUTPATIENT
Start: 2024-04-26 | End: 2024-05-02

## 2024-04-26 RX ORDER — POTASSIUM CHLORIDE 20 MEQ
40 PACKET (EA) ORAL ONCE
Refills: 0 | Status: COMPLETED | OUTPATIENT
Start: 2024-04-26 | End: 2024-04-26

## 2024-04-26 RX ADMIN — CALAMINE AND ZINC OXIDE AND PHENOL 1 APPLICATION(S): 160; 10 LOTION TOPICAL at 12:30

## 2024-04-26 RX ADMIN — Medication 1 MILLIGRAM(S): at 17:29

## 2024-04-26 RX ADMIN — Medication 25 GRAM(S): at 05:56

## 2024-04-26 RX ADMIN — VALSARTAN 160 MILLIGRAM(S): 80 TABLET ORAL at 05:56

## 2024-04-26 RX ADMIN — SODIUM CHLORIDE 100 MILLILITER(S): 9 INJECTION INTRAMUSCULAR; INTRAVENOUS; SUBCUTANEOUS at 05:56

## 2024-04-26 RX ADMIN — CHLORHEXIDINE GLUCONATE 1 APPLICATION(S): 213 SOLUTION TOPICAL at 05:56

## 2024-04-26 RX ADMIN — SODIUM CHLORIDE 1 GRAM(S): 9 INJECTION INTRAMUSCULAR; INTRAVENOUS; SUBCUTANEOUS at 13:16

## 2024-04-26 RX ADMIN — CEFTRIAXONE 100 MILLIGRAM(S): 500 INJECTION, POWDER, FOR SOLUTION INTRAMUSCULAR; INTRAVENOUS at 02:41

## 2024-04-26 RX ADMIN — Medication 40 MILLIEQUIVALENT(S): at 11:38

## 2024-04-26 RX ADMIN — CHOLESTYRAMINE 4 GRAM(S): 4 POWDER, FOR SUSPENSION ORAL at 21:48

## 2024-04-26 RX ADMIN — SODIUM CHLORIDE 1 GRAM(S): 9 INJECTION INTRAMUSCULAR; INTRAVENOUS; SUBCUTANEOUS at 21:48

## 2024-04-26 RX ADMIN — ENOXAPARIN SODIUM 40 MILLIGRAM(S): 100 INJECTION SUBCUTANEOUS at 05:56

## 2024-04-26 NOTE — PROGRESS NOTE ADULT - PROBLEM SELECTOR PLAN 10
DVT ppx: SCDs  Diet: CLD, no coffee/fish  PT c/s    GOC: DNR/intubate per discussion overnight (unclear if time of night affected discussion). Was DNR/DNI per family members. At this time family does not wish to change MOLST until speaking more w/ patient

## 2024-04-26 NOTE — PROGRESS NOTE ADULT - PROBLEM SELECTOR PLAN 7
On Diovan and Natrilix at home  - cont with home valsartan CT findings extensive aortic valve leaflet calcifications, systolic murmur appreciated on exam  - denies any significant change or decrease in functional status but baseline functional status is low given her age  - no significant swelling nor SOB at rest  - f/u TTE - mod to severe AS, EF of 63%  - Cardiology consult for cardiac clearance, recs appreciated.

## 2024-04-26 NOTE — PROGRESS NOTE ADULT - PROBLEM SELECTOR PLAN 2
Presents with pruritis, jaundice, and elevated Bilirubin, Alk Phos, and AST, likely in setting of obstruction/compression by pancreatic mass  - CT imaging findings as above  - R factor 0.3, cholestatic  - No leukocytosis, no abdominal pain nor tenderness, no fevers/chills lately, no diarrhea nor signs/symptoms of possible colitis mentioned on CT  - sequelae of chronic cholecystitis likely iso above CT findings  - hepatitis panel negative   - f/u GI recs regarding possible stent Patient with downtrending hgb, likely in the setting of chronic disease. Patient with 2 bright red BM on 4/26.  - No blood products; patient is [place].  - GI following, recs appreciated  - Heme consulted, recs appreciated  - F/u iron studies, B12, folate, retic count  - Daily folate supplementation, consider Epo  - D/c lovenox, anti-HTNs  - Stool count

## 2024-04-26 NOTE — PROGRESS NOTE ADULT - SUBJECTIVE AND OBJECTIVE BOX
Chief Complaint:  Patient is a 89y old  Female who presents with a chief complaint of Pancreatic mass, abnormal lab-work (26 Apr 2024 13:03)       Interval Events:   Afebrile and stable  However, patient with further drop in Na to 123 then 125.   ERCP cancelled given hyponatremia and was pending cardiac clearance.  Primary reached out to inform GI that patient had 2 bloody red BMs, remained stable, but did not transfuse as patient is a [employer].   Hgb drop from 10 to 8. Repeat Hgb now 10  Patient assessed at bedside by GI, denying n/v/abdominal pain diarrhea or constipation.       Hospital Medications:  calamine/zinc oxide Lotion 1 Application(s) Topical daily  chlorhexidine 2% Cloths 1 Application(s) Topical <User Schedule>  cholestyramine Powder (Sugar-Free) 4 Gram(s) Oral two times a day  folic acid 1 milliGRAM(s) Oral daily  melatonin 3 milliGRAM(s) Oral at bedtime PRN  sodium chloride 1 Gram(s) Oral three times a day  sodium chloride 0.9%. 1000 milliLiter(s) IV Continuous <Continuous>      ROS:   Complete and normal except as mentioned above.    PHYSICAL EXAM:   Vital Signs:  Vital Signs Last 24 Hrs  T(C): 37.1 (26 Apr 2024 13:06), Max: 37.1 (26 Apr 2024 12:10)  T(F): 98.8 (26 Apr 2024 13:06), Max: 98.8 (26 Apr 2024 13:06)  HR: 76 (26 Apr 2024 13:06) (63 - 89)  BP: 103/59 (26 Apr 2024 13:06) (103/59 - 159/65)  BP(mean): --  RR: 18 (26 Apr 2024 13:06) (18 - 18)  SpO2: 100% (26 Apr 2024 13:06) (97% - 100%)    Parameters below as of 26 Apr 2024 13:06  Patient On (Oxygen Delivery Method): room air      Daily     Daily     GENERAL: no acute distress  NEURO: alert  HEENT: anicteric sclera, no conjunctival pallor appreciated  CHEST: no respiratory distress, no accessory muscle use  CARDIAC: regular rate, rhythm  ABDOMEN: soft, non-tender, non-distended, no rebound or guarding  EXTREMITIES: warm, well perfused, no edema  EDIE: patient deferred  SKIN: no lesions noted    LABS:                          10.3   5.97  )-----------( 286      ( 26 Apr 2024 11:21 )             34.0     04-26    125<L>  |  92<L>  |  8   ----------------------------<  118<H>  3.4<L>   |  19<L>  |  0.33<L>    Ca    9.1      26 Apr 2024 09:06  Phos  2.8     04-26  Mg     1.60     04-26    TPro  7.2  /  Alb  3.0<L>  /  TBili  18.9<H>  /  DBili  15.6<H>  /  AST  57<H>  /  ALT  33  /  AlkPhos  337<H>  04-26    LIVER FUNCTIONS - ( 26 Apr 2024 03:00 )  Alb: 3.0 g/dL / Pro: 7.2 g/dL / ALK PHOS: 337 U/L / ALT: 33 U/L / AST: 57 U/L / GGT: x             Interval Diagnostic Studies:  No new imaging.

## 2024-04-26 NOTE — PROGRESS NOTE ADULT - PROBLEM SELECTOR PLAN 6
CT findings extensive aortic valve leaflet calcifications, systolic murmur appreciated on exam  - denies any significant change or decrease in functional status but baseline functional status is low given her age  - no significant swelling nor SOB at rest  - f/u TTE  - likely aortic stenosis   - may require cardiology c/s pending GI recs re palliative stent K 3.2 on admission as well as mild hyponatremia on admission, likely iso poor PO intake given poor appetite and weight loss  - cont to trend electrolytes and replete PRN

## 2024-04-26 NOTE — PROGRESS NOTE ADULT - PROBLEM SELECTOR PLAN 4
UA with WBC and many bacteria. Endorses burning with urination  - CT abd/pelvis with findings of mild thickening of the bladder base with prominence of the proximal urethra  - Ceftriaxone for simple cystitis  - f/u UCx, tailor antibiotics Likely iso cholestasis given above. No rashes or lesions visualized.   - Can cont with antihistamine to assess if improvement  - Trial Cholestyramine BID  - Limit benadryl due to sedating effects  - Calamine lotion

## 2024-04-26 NOTE — PROGRESS NOTE ADULT - ASSESSMENT
# Painless jaundice c/f pancreatic mass and PD dilatation on CT  - CTAP 4/23 revealing intra/extrahep biliary dilatation with a CBD measuring up to 1.4 cm, a poorly defined unicnate process hypodense mass measuring 1.4 cm vs a portion of the dilated PD but suspicious for adenocarcinoma as well as a masslike appearance of the pancreatic head measuring approximately 3.2 x 3.4 cm corresponding with duodenal wall thickening though reporting enhances similarly t the remaining normal pancreatic parenchyma. There was mention of ~90 degree contact with the SMV near the portal splenic confluence and nodular thickening of the L adrenal gland central, cholelithiasis with possible sequelae of chronic cholecystitis, colonic diverticulosis, apparent wall thickening of the rt hemicolon thoug hfavored to be due to underdistention as per report; otherwise no evidence of mets in the abdomen or pelvis; mention of findings suggestive of aortic stenosis.      # Hematochezia - two episodes of BRBPR reported by primary 4/26, pt deferring EDIE, HDS with Hgb stable (from 10 to 8, repeat 10) w/o transfusion given patient is [employer]     # Moderate AS    Recommendations:  - Will plan for ERCP with palliative stenting early next week if medically optimized given hyponatremia  - Please ensure patient is medically optimized   - Patient prefers not to undergo colonoscopy at this time and would like to monitor for recurrent episodes, should they occur, she plans to address it at that time  - Cards consulted, appreciate recs: stating patient is at elevated, but not prohibitive risk for stent, requesting EKG prior to procedures and if no significant arrhythmias states patient may proceed with procedure, also recommending light sedation or vasoplegic anesthesia induction judiciously as patient is preload dependent in setting of her valvular disease  - Palliative, Oncology following, appreciate risks  - Continue cholestyramine for pruritis  - Daily CBC, CMP, coags  - Ensure adequate hydration  - Maintain working IV  - Serial abdominal exams  - If becomes febrile, check BCx and start Abx  - If becomes hemodynamically unstable, consult MICU and inform GI  - Rest of care per primary    Preliminary note until signed by Attending.    Thank you for involving us in this patient's care. Please reach out if any further questions or concerns.    Sahra Veloz MD  Gastroenterology/Hepatology Fellow, PGY-7    NON-URGENT CONSULTS:  Please email [email or web address]  AT NIGHT AND ON WEEKENDS:  Contact on-call GI fellow via answering service ([phone]) from 5pm-8am and on weekends/holidays  MONDAY-FRIDAY 8AM-5PM:  Pager: [phone] ([facility])  Pager: [phone] ([place])

## 2024-04-26 NOTE — CONSULT NOTE ADULT - SUBJECTIVE AND OBJECTIVE BOX
Cardiovascular Disease Initial Evaluation  Date of service: 04-26-24 @ 13:03    CHIEF COMPLAINT: Pancreatic Mass    HISTORY OF PRESENT ILLNESS:  89F with PMH of HTN, HLD, [place], and peripheral vascular disease presenting to the ED for elevated LFTs over the past 3 weeks with symptoms of painless jaundice and itchiness, found to have elevated Bilirubin and Alk Phos with CT findings of CBD dilatation iso pancreatic mass, admitted for further work-up and management.        Allergies    fish (Anaphylaxis)  No Known Drug Allergies  coffee (Unknown)    Intolerances    	    MEDICATIONS:  enoxaparin Injectable 40 milliGRAM(s) SubCutaneous every 24 hours  valsartan 160 milliGRAM(s) Oral daily        melatonin 3 milliGRAM(s) Oral at bedtime PRN      cholestyramine Powder (Sugar-Free) 4 Gram(s) Oral two times a day    calamine/zinc oxide Lotion 1 Application(s) Topical daily  chlorhexidine 2% Cloths 1 Application(s) Topical <User Schedule>  sodium chloride 1 Gram(s) Oral three times a day  sodium chloride 0.9%. 1000 milliLiter(s) IV Continuous <Continuous>      PAST MEDICAL & SURGICAL HISTORY:  HTN (hypertension)      HLD (hyperlipidemia)      Peripheral vascular disease      History of myomectomy          FAMILY HISTORY:  Family history of GI malignancy        SOCIAL HISTORY:    The patient is a nonsmoker       REVIEW OF SYSTEMS:  See HPI, otherwise complete 14 point review of systems negative    [x ] All others negative	  [ ] Unable to obtain    PHYSICAL EXAM:  T(C): 36.5 (04-26-24 @ 05:35), Max: 36.7 (04-25-24 @ 22:49)  HR: 63 (04-26-24 @ 05:35) (63 - 77)  BP: 141/74 (04-26-24 @ 05:35) (141/74 - 159/65)  RR: 18 (04-26-24 @ 05:35) (18 - 18)  SpO2: 100% (04-26-24 @ 05:35) (97% - 100%)  Wt(kg): --  I&O's Summary    25 Apr 2024 07:01  -  26 Apr 2024 07:00  --------------------------------------------------------  IN: 1080 mL / OUT: 1400 mL / NET: -320 mL        Appearance: No Acute Distress; resting comfortably  HEENT:  Normal oral mucosa, PERRL, EOMI	  Cardiovascular: Normal S1 S2, No JVD, No murmurs/rubs/gallops  Respiratory: Normal respiratory effort; Lungs clear to auscultation bilaterally  Gastrointestinal:  Soft, Non-tender, + BS	  Skin: No rashes, No ecchymoses, No cyanosis	  Neurologic: Non-focal; no weakness  Extremities: No clubbing, cyanosis or edema  Vascular: Peripheral pulses palpable 2+ bilaterally  Psychiatry: A & O x 3, Mood & affect appropriate    Laboratory Data:	 	    CBC Full  -  ( 26 Apr 2024 03:00 )  WBC Count : 5.82 K/uL  Hemoglobin : 8.8 g/dL  Hematocrit : 27.0 %  Platelet Count - Automated : 265 K/uL  Mean Cell Volume : 79.7 fL  Mean Cell Hemoglobin : 26.7 pg  Mean Cell Hemoglobin Concentration : 32.5 gm/dL  Auto Neutrophil # : 3.71 K/uL  Auto Lymphocyte # : 1.46 K/uL  Auto Monocyte # : 0.43 K/uL  Auto Eosinophil # : 0.16 K/uL  Auto Basophil # : 0.03 K/uL  Auto Neutrophil % : 63.8 %  Auto Lymphocyte % : 25.1 %  Auto Monocyte % : 7.4 %  Auto Eosinophil % : 2.7 %  Auto Basophil % : 0.5 %    04-26    125<L>  |  92<L>  |  8   ----------------------------<  118<H>  3.4<L>   |  19<L>  |  0.33<L>  04-26    123<L>  |  93<L>  |  9   ----------------------------<  126<H>  3.5   |  18<L>  |  0.35<L>    Ca    9.1      26 Apr 2024 09:06  Ca    8.9      26 Apr 2024 03:00  Phos  2.8     04-26  Phos  2.7     04-25  Mg     1.60     04-26  Mg     1.70     04-25    TPro  7.2  /  Alb  3.0<L>  /  TBili  18.9<H>  /  DBili  15.6<H>  /  AST  57<H>  /  ALT  33  /  AlkPhos  337<H>  04-26  TPro  7.4  /  Alb  3.1<L>  /  TBili  19.0<H>  /  DBili  15.8<H>  /  AST  55<H>  /  ALT  29  /  AlkPhos  345<H>  04-25      proBNP:   Lipid Profile:   HgA1c:   TSH:       CARDIAC MARKERS:            Interpretation of Telemetry: N/a	    ECG:  Not in chart  RADIOLOGY:  OTHER: 	    PREVIOUS DIAGNOSTIC TESTING:    [x ] Echocardiogram: 4/25/2024      1. Left ventricular systolic function is normal with an ejection fraction of 63 % by Meeks's method of disks.   2. Normal left ventricular diastolic function.   3. Normal right ventricular cavity size, with normal wall thickness, and normal systolic function.   4. Mild to moderate mitral regurgitation.   5. Normal left and right atrial size.   6. Mild left ventricular hypertrophy.   7. There is moderate calcification of the aortic valve leaflets. Moderate to severe aortic stenosis.  [ ] Catheterization:  [ ] Stress Test:  	    Assessment:   89F with PMH of HTN, HLD, [place], and peripheral vascular disease presenting to the ED for elevated LFTs over the past 3 weeks with symptoms of painless jaundice and itchiness    Plan of Care:    #Cardiac risk stratification  - Patient being considered to palliative stenting   - Of note patient has moderate to severe AS on Echo  - Patient is not fluid overloaded on exam  -         77 minutes spent on total encounter; more than 50% of the visit was spent counseling and/or coordinating care by the attending physician.   	  Danny Bella DO [facility]  Cardiovascular Diseases  [phone]     Cardiovascular Disease Initial Evaluation  Date of service: 04-26-24 @ 13:03    CHIEF COMPLAINT: Pancreatic Mass    HISTORY OF PRESENT ILLNESS:  89F with PMH of HTN, HLD, [place], and peripheral vascular disease presenting to the ED for elevated LFTs over the past 3 weeks with symptoms of painless jaundice and itchiness, found to have elevated Bilirubin and Alk Phos with CT findings of CBD dilatation iso pancreatic mass, admitted for further work-up and management.        Allergies    fish (Anaphylaxis)  No Known Drug Allergies  coffee (Unknown)    Intolerances    	    MEDICATIONS:  enoxaparin Injectable 40 milliGRAM(s) SubCutaneous every 24 hours  valsartan 160 milliGRAM(s) Oral daily        melatonin 3 milliGRAM(s) Oral at bedtime PRN      cholestyramine Powder (Sugar-Free) 4 Gram(s) Oral two times a day    calamine/zinc oxide Lotion 1 Application(s) Topical daily  chlorhexidine 2% Cloths 1 Application(s) Topical <User Schedule>  sodium chloride 1 Gram(s) Oral three times a day  sodium chloride 0.9%. 1000 milliLiter(s) IV Continuous <Continuous>      PAST MEDICAL & SURGICAL HISTORY:  HTN (hypertension)      HLD (hyperlipidemia)      Peripheral vascular disease      History of myomectomy          FAMILY HISTORY:  Family history of GI malignancy        SOCIAL HISTORY:    The patient is a nonsmoker       REVIEW OF SYSTEMS:  See HPI, otherwise complete 14 point review of systems negative    [x ] All others negative	  [ ] Unable to obtain    PHYSICAL EXAM:  T(C): 36.5 (04-26-24 @ 05:35), Max: 36.7 (04-25-24 @ 22:49)  HR: 63 (04-26-24 @ 05:35) (63 - 77)  BP: 141/74 (04-26-24 @ 05:35) (141/74 - 159/65)  RR: 18 (04-26-24 @ 05:35) (18 - 18)  SpO2: 100% (04-26-24 @ 05:35) (97% - 100%)  Wt(kg): --  I&O's Summary    25 Apr 2024 07:01  -  26 Apr 2024 07:00  --------------------------------------------------------  IN: 1080 mL / OUT: 1400 mL / NET: -320 mL        Appearance: No Acute Distress; resting comfortably  HEENT:  Normal oral mucosa, PERRL, EOMI	  Cardiovascular: Normal S1 S2, No JVD, No murmurs/rubs/gallops  Respiratory: Normal respiratory effort; Lungs clear to auscultation bilaterally  Gastrointestinal:  Soft, Non-tender, + BS	  Skin: No rashes, No ecchymoses, No cyanosis	  Neurologic: Non-focal; no weakness  Extremities: No clubbing, cyanosis or edema  Vascular: Peripheral pulses palpable 2+ bilaterally  Psychiatry: A & O x 3, Mood & affect appropriate    Laboratory Data:	 	    CBC Full  -  ( 26 Apr 2024 03:00 )  WBC Count : 5.82 K/uL  Hemoglobin : 8.8 g/dL  Hematocrit : 27.0 %  Platelet Count - Automated : 265 K/uL  Mean Cell Volume : 79.7 fL  Mean Cell Hemoglobin : 26.7 pg  Mean Cell Hemoglobin Concentration : 32.5 gm/dL  Auto Neutrophil # : 3.71 K/uL  Auto Lymphocyte # : 1.46 K/uL  Auto Monocyte # : 0.43 K/uL  Auto Eosinophil # : 0.16 K/uL  Auto Basophil # : 0.03 K/uL  Auto Neutrophil % : 63.8 %  Auto Lymphocyte % : 25.1 %  Auto Monocyte % : 7.4 %  Auto Eosinophil % : 2.7 %  Auto Basophil % : 0.5 %    04-26    125<L>  |  92<L>  |  8   ----------------------------<  118<H>  3.4<L>   |  19<L>  |  0.33<L>  04-26    123<L>  |  93<L>  |  9   ----------------------------<  126<H>  3.5   |  18<L>  |  0.35<L>    Ca    9.1      26 Apr 2024 09:06  Ca    8.9      26 Apr 2024 03:00  Phos  2.8     04-26  Phos  2.7     04-25  Mg     1.60     04-26  Mg     1.70     04-25    TPro  7.2  /  Alb  3.0<L>  /  TBili  18.9<H>  /  DBili  15.6<H>  /  AST  57<H>  /  ALT  33  /  AlkPhos  337<H>  04-26  TPro  7.4  /  Alb  3.1<L>  /  TBili  19.0<H>  /  DBili  15.8<H>  /  AST  55<H>  /  ALT  29  /  AlkPhos  345<H>  04-25      proBNP:   Lipid Profile:   HgA1c:   TSH:       CARDIAC MARKERS:            Interpretation of Telemetry: N/a	    ECG:  Not in chart  RADIOLOGY:  OTHER: 	    PREVIOUS DIAGNOSTIC TESTING:    [x ] Echocardiogram: 4/25/2024      1. Left ventricular systolic function is normal with an ejection fraction of 63 % by Meeks's method of disks.   2. Normal left ventricular diastolic function.   3. Normal right ventricular cavity size, with normal wall thickness, and normal systolic function.   4. Mild to moderate mitral regurgitation.   5. Normal left and right atrial size.   6. Mild left ventricular hypertrophy.   7. There is moderate calcification of the aortic valve leaflets. Moderate to severe aortic stenosis.  [ ] Catheterization:  [ ] Stress Test:  	    Assessment:   89F with PMH of HTN, HLD, [place], and peripheral vascular disease presenting to the ED for elevated LFTs over the past 3 weeks with symptoms of painless jaundice and itchiness    Plan of Care:    #Cardiac risk stratification  - Patient being considered to palliative stenting   - Of note patient has moderate to severe AS on Echo  - Patient is not fluid overloaded on exam  - She is at elevated, but not prohibitive, risk for stent  - Please use light sedation or vasoplegic anesthesia induction judiciously, as the patient is preload dependent in the setting of her valvular disease.   - Please obtain EKG prior to procedure  - If no significant arrhythmias patient may proceed     #HTN  - BP acceptable on current regimen    #HLD  - Statin on hold in setting of transaminitis        77 minutes spent on total encounter; more than 50% of the visit was spent counseling and/or coordinating care by the attending physician.   	  Danny Bella DO [facility]  Cardiovascular Diseases  [phone]     Cardiovascular Disease Initial Evaluation  Date of service: 04-26-24 @ 13:03    CHIEF COMPLAINT: Pancreatic Mass    HISTORY OF PRESENT ILLNESS:  89F with PMH of HTN, HLD, [place], and peripheral vascular disease presenting to the ED for elevated LFTs over the past 3 weeks with symptoms of painless jaundice and itchiness, found to have elevated Bilirubin and Alk Phos with CT findings of CBD dilatation iso pancreatic mass, admitted for further work-up and management.        Allergies    fish (Anaphylaxis)  No Known Drug Allergies  coffee (Unknown)    Intolerances    	    MEDICATIONS:  enoxaparin Injectable 40 milliGRAM(s) SubCutaneous every 24 hours  valsartan 160 milliGRAM(s) Oral daily        melatonin 3 milliGRAM(s) Oral at bedtime PRN      cholestyramine Powder (Sugar-Free) 4 Gram(s) Oral two times a day    calamine/zinc oxide Lotion 1 Application(s) Topical daily  chlorhexidine 2% Cloths 1 Application(s) Topical <User Schedule>  sodium chloride 1 Gram(s) Oral three times a day  sodium chloride 0.9%. 1000 milliLiter(s) IV Continuous <Continuous>      PAST MEDICAL & SURGICAL HISTORY:  HTN (hypertension)      HLD (hyperlipidemia)      Peripheral vascular disease      History of myomectomy          FAMILY HISTORY:  Family history of GI malignancy        SOCIAL HISTORY:    The patient is a nonsmoker       REVIEW OF SYSTEMS:  See HPI, otherwise complete 14 point review of systems negative    [x ] All others negative	  [ ] Unable to obtain    PHYSICAL EXAM:  T(C): 36.5 (04-26-24 @ 05:35), Max: 36.7 (04-25-24 @ 22:49)  HR: 63 (04-26-24 @ 05:35) (63 - 77)  BP: 141/74 (04-26-24 @ 05:35) (141/74 - 159/65)  RR: 18 (04-26-24 @ 05:35) (18 - 18)  SpO2: 100% (04-26-24 @ 05:35) (97% - 100%)  Wt(kg): --  I&O's Summary    25 Apr 2024 07:01  -  26 Apr 2024 07:00  --------------------------------------------------------  IN: 1080 mL / OUT: 1400 mL / NET: -320 mL        Appearance: No Acute Distress; resting comfortably  HEENT:  Normal oral mucosa, PERRL, EOMI	  Cardiovascular: Normal S1 S2, No JVD, No murmurs/rubs/gallops  Respiratory: Normal respiratory effort; Lungs clear to auscultation bilaterally  Gastrointestinal:  Soft, Non-tender, + BS	  Skin: No rashes, No ecchymoses, No cyanosis	  Neurologic: Non-focal; no weakness  Extremities: No clubbing, cyanosis or edema  Vascular: Peripheral pulses palpable 2+ bilaterally  Psychiatry: A & O x 3, Mood & affect appropriate    Laboratory Data:	 	    CBC Full  -  ( 26 Apr 2024 03:00 )  WBC Count : 5.82 K/uL  Hemoglobin : 8.8 g/dL  Hematocrit : 27.0 %  Platelet Count - Automated : 265 K/uL  Mean Cell Volume : 79.7 fL  Mean Cell Hemoglobin : 26.7 pg  Mean Cell Hemoglobin Concentration : 32.5 gm/dL  Auto Neutrophil # : 3.71 K/uL  Auto Lymphocyte # : 1.46 K/uL  Auto Monocyte # : 0.43 K/uL  Auto Eosinophil # : 0.16 K/uL  Auto Basophil # : 0.03 K/uL  Auto Neutrophil % : 63.8 %  Auto Lymphocyte % : 25.1 %  Auto Monocyte % : 7.4 %  Auto Eosinophil % : 2.7 %  Auto Basophil % : 0.5 %    04-26    125<L>  |  92<L>  |  8   ----------------------------<  118<H>  3.4<L>   |  19<L>  |  0.33<L>  04-26    123<L>  |  93<L>  |  9   ----------------------------<  126<H>  3.5   |  18<L>  |  0.35<L>    Ca    9.1      26 Apr 2024 09:06  Ca    8.9      26 Apr 2024 03:00  Phos  2.8     04-26  Phos  2.7     04-25  Mg     1.60     04-26  Mg     1.70     04-25    TPro  7.2  /  Alb  3.0<L>  /  TBili  18.9<H>  /  DBili  15.6<H>  /  AST  57<H>  /  ALT  33  /  AlkPhos  337<H>  04-26  TPro  7.4  /  Alb  3.1<L>  /  TBili  19.0<H>  /  DBili  15.8<H>  /  AST  55<H>  /  ALT  29  /  AlkPhos  345<H>  04-25      proBNP:   Lipid Profile:   HgA1c:   TSH:       CARDIAC MARKERS:            Interpretation of Telemetry: N/a	    ECG:  Not in chart  RADIOLOGY:  OTHER: 	    PREVIOUS DIAGNOSTIC TESTING:    [x ] Echocardiogram: 4/25/2024      1. Left ventricular systolic function is normal with an ejection fraction of 63 % by Meeks's method of disks.   2. Normal left ventricular diastolic function.   3. Normal right ventricular cavity size, with normal wall thickness, and normal systolic function.   4. Mild to moderate mitral regurgitation.   5. Normal left and right atrial size.   6. Mild left ventricular hypertrophy.   7. There is moderate calcification of the aortic valve leaflets. Moderate to severe aortic stenosis.  [ ] Catheterization:  [ ] Stress Test:  	    Assessment:   89F with PMH of HTN, HLD, [place], and peripheral vascular disease presenting to the ED for elevated LFTs over the past 3 weeks with symptoms of painless jaundice and itchiness    Plan of Care:    #Cardiac risk stratification  - Patient being considered to palliative stenting   - Of note patient has moderate to severe AS on Echo  - Patient is not fluid overloaded on exam  - She is at elevated, but not prohibitive, risk for stent  - Please use light sedation or vasoplegic anesthesia induction judiciously, as the patient is preload dependent in the setting of her valvular disease.   - Please obtain EKG prior to procedure  - If no significant arrhythmias patient may proceed     #HTN  - BP acceptable on current regimen    #HLD  - Statin on hold in setting of transaminitis    Complex medical decision making in this patient with active comorbidities.     127 minutes spent on total encounter; more than 50% of the visit was spent counseling and/or coordinating care by the attending physician.   	  Danny Bella DO [facility]  Cardiovascular Diseases  [phone]

## 2024-04-26 NOTE — PROGRESS NOTE ADULT - PROBLEM SELECTOR PLAN 3
Likely iso cholestasis given above. No rashes or lesions visualized.   - can cont with antihistamine to assess if improvement  - trial Cholestyramine BID  - Limit benadryl due to sedating effects  - calamine lotion Presents with pruritis, jaundice, and elevated Bilirubin, Alk Phos, and AST, likely in setting of obstruction/compression by pancreatic mass  - CT imaging findings as above  - R factor 0.3, cholestatic  - No leukocytosis, no abdominal pain nor tenderness, no fevers/chills lately, no diarrhea nor signs/symptoms of possible colitis mentioned on CT  - sequelae of chronic cholecystitis likely iso above CT findings  - hepatitis panel negative   - f/u GI recs regarding possible stent - planned for 4/26 - cancelled 2/2 to hyponatremia

## 2024-04-26 NOTE — PROGRESS NOTE ADULT - SUBJECTIVE AND OBJECTIVE BOX
Patient is a 89y old  Female who presents with a chief complaint of Pancreatic mass, abnormal lab-work    SUBJECTIVE / OVERNIGHT EVENTS: Patient seen and examined at bedside. No overnight events.    MEDICATIONS  (STANDING):  calamine/zinc oxide Lotion 1 Application(s) Topical daily  chlorhexidine 2% Cloths 1 Application(s) Topical <User Schedule>  cholestyramine Powder (Sugar-Free) 4 Gram(s) Oral two times a day  enoxaparin Injectable 40 milliGRAM(s) SubCutaneous every 24 hours  sodium chloride 0.9%. 500 milliLiter(s) (75 mL/Hr) IV Continuous <Continuous>  sodium chloride 0.9%. 1000 milliLiter(s) (100 mL/Hr) IV Continuous <Continuous>  valsartan 160 milliGRAM(s) Oral daily    MEDICATIONS  (PRN):  melatonin 3 milliGRAM(s) Oral at bedtime PRN Insomnia    Vital Signs Last 24 Hrs  T(C): 36.5 (26 Apr 2024 05:35), Max: 36.7 (25 Apr 2024 22:49)  T(F): 97.7 (26 Apr 2024 05:35), Max: 98.1 (25 Apr 2024 22:49)  HR: 63 (26 Apr 2024 05:35) (63 - 77)  BP: 141/74 (26 Apr 2024 05:35) (141/74 - 159/65)  BP(mean): --  RR: 18 (26 Apr 2024 05:35) (18 - 18)  SpO2: 100% (26 Apr 2024 05:35) (97% - 100%)    Parameters below as of 26 Apr 2024 05:35  Patient On (Oxygen Delivery Method): room air    CAPILLARY BLOOD GLUCOSE    I&O's Summary    25 Apr 2024 07:01  -  26 Apr 2024 07:00  --------------------------------------------------------  IN: 1080 mL / OUT: 1400 mL / NET: -320 mL    PHYSICAL EXAM:  CONSTITUTIONAL: NAD  HEENT: NC/AT  RESPIRATORY: Normal respiratory effort; lungs are clear to auscultation bilaterally  CARDIOVASCULAR: Regular rate and rhythm, normal S1 and S2, no murmur/rub/gallop; No lower extremity edema; Peripheral pulses are 2+ bilaterally  ABDOMEN: Nontender to palpation, normoactive bowel sounds, no rebound/guarding; No hepatosplenomegaly  MUSCLOSKELETAL: no clubbing or cyanosis of digits; no joint swelling or tenderness to palpation  EXTREMITIES: no peripheral edema, distal pulses intact   NEURO: no focal deficits   PSYCH: A+O to person, place, and time; affect appropriate    LABS:                        8.8    5.82  )-----------( 265      ( 26 Apr 2024 03:00 )             27.0     04-26    123<L>  |  93<L>  |  9   ----------------------------<  126<H>  3.5   |  18<L>  |  0.35<L>    Ca    8.9      26 Apr 2024 03:00  Phos  2.8     04-26  Mg     1.60     04-26    TPro  7.2  /  Alb  3.0<L>  /  TBili  18.9<H>  /  DBili  15.6<H>  /  AST  57<H>  /  ALT  33  /  AlkPhos  337<H>  04-26    PT/INR - ( 26 Apr 2024 03:00 )   PT: 16.0 sec;   INR: 1.43 ratio      PTT - ( 26 Apr 2024 03:00 )  PTT:39.0 sec    Urinalysis Basic - ( 26 Apr 2024 03:00 )    Color: x / Appearance: x / SG: x / pH: x  Gluc: 126 mg/dL / Ketone: x  / Bili: x / Urobili: x   Blood: x / Protein: x / Nitrite: x   Leuk Esterase: x / RBC: x / WBC x   Sq Epi: x / Non Sq Epi: x / Bacteria: x    RADIOLOGY & ADDITIONAL TESTS:    Imaging Personally Reviewed:    Consultant(s) Notes Reviewed:      Care Discussed with Consultants/Other Providers:    Josep Patel MD, Internal Medicine Resident Patient is a 89y old  Female who presents with a chief complaint of Pancreatic mass, abnormal lab-work    SUBJECTIVE / OVERNIGHT EVENTS: Patient seen and examined at bedside. No overnight events. Patient still endorsing severe pruritis this AM. Denies CP, SOB, subjective fever, chills, n/v/d.    MEDICATIONS  (STANDING):  calamine/zinc oxide Lotion 1 Application(s) Topical daily  chlorhexidine 2% Cloths 1 Application(s) Topical <User Schedule>  cholestyramine Powder (Sugar-Free) 4 Gram(s) Oral two times a day  enoxaparin Injectable 40 milliGRAM(s) SubCutaneous every 24 hours  sodium chloride 0.9%. 500 milliLiter(s) (75 mL/Hr) IV Continuous <Continuous>  sodium chloride 0.9%. 1000 milliLiter(s) (100 mL/Hr) IV Continuous <Continuous>  valsartan 160 milliGRAM(s) Oral daily    MEDICATIONS  (PRN):  melatonin 3 milliGRAM(s) Oral at bedtime PRN Insomnia    Vital Signs Last 24 Hrs  T(C): 36.5 (26 Apr 2024 05:35), Max: 36.7 (25 Apr 2024 22:49)  T(F): 97.7 (26 Apr 2024 05:35), Max: 98.1 (25 Apr 2024 22:49)  HR: 63 (26 Apr 2024 05:35) (63 - 77)  BP: 141/74 (26 Apr 2024 05:35) (141/74 - 159/65)  BP(mean): --  RR: 18 (26 Apr 2024 05:35) (18 - 18)  SpO2: 100% (26 Apr 2024 05:35) (97% - 100%)    Parameters below as of 26 Apr 2024 05:35  Patient On (Oxygen Delivery Method): room air    CAPILLARY BLOOD GLUCOSE    I&O's Summary    25 Apr 2024 07:01  -  26 Apr 2024 07:00  --------------------------------------------------------  IN: 1080 mL / OUT: 1400 mL / NET: -320 mL    PHYSICAL EXAM:  CONSTITUTIONAL: NAD  HEENT: +Scleral icterus. NC/AT  RESPIRATORY: Normal respiratory effort; lungs are clear to auscultation bilaterally  CARDIOVASCULAR: Regular rate and rhythm, normal S1 and S2, no murmur/rub/gallop; No lower extremity edema; Peripheral pulses are 2+ bilaterally  ABDOMEN: Nontender to palpation, normoactive bowel sounds, no rebound/guarding; No hepatosplenomegaly  MUSCLOSKELETAL: no clubbing or cyanosis of digits; no joint swelling or tenderness to palpation  EXTREMITIES: no peripheral edema, distal pulses intact   NEURO: no focal deficits   PSYCH: A+O3 to person, place, and time; affect appropriate    LABS:                        8.8    5.82  )-----------( 265      ( 26 Apr 2024 03:00 )             27.0     04-26    123<L>  |  93<L>  |  9   ----------------------------<  126<H>  3.5   |  18<L>  |  0.35<L>    Ca    8.9      26 Apr 2024 03:00  Phos  2.8     04-26  Mg     1.60     04-26    TPro  7.2  /  Alb  3.0<L>  /  TBili  18.9<H>  /  DBili  15.6<H>  /  AST  57<H>  /  ALT  33  /  AlkPhos  337<H>  04-26    PT/INR - ( 26 Apr 2024 03:00 )   PT: 16.0 sec;   INR: 1.43 ratio      PTT - ( 26 Apr 2024 03:00 )  PTT:39.0 sec    Urinalysis Basic - ( 26 Apr 2024 03:00 )    Color: x / Appearance: x / SG: x / pH: x  Gluc: 126 mg/dL / Ketone: x  / Bili: x / Urobili: x   Blood: x / Protein: x / Nitrite: x   Leuk Esterase: x / RBC: x / WBC x   Sq Epi: x / Non Sq Epi: x / Bacteria: x    RADIOLOGY & ADDITIONAL TESTS:    Imaging Personally Reviewed:    Consultant(s) Notes Reviewed:      Care Discussed with Consultants/Other Providers:    Josep Patel MD, Internal Medicine Resident

## 2024-04-26 NOTE — PROGRESS NOTE ADULT - PROBLEM SELECTOR PLAN 5
K 3.2 on admission as well as mild hyponatremia on admission, likely iso poor PO intake given poor appetite and weight loss  - cont to trend electrolytes and replete PRN Patient hyponatremic to 129 on admission, as low as 123 on 4/26. Likely in the setting of malignancy, decreased PO intake, vs SIADH.  - Serum osm: 266, urine osm: 637, urine Na: 147.  - TSH wnl, f/u AM cortisol  - Salt tabs 1g TID  - Optimize glucose control  - Fluid restriction to 1L, d/c DASH diet  - Caution not to overcorrect >6-8 within 24h period

## 2024-04-26 NOTE — MEDICAL STUDENT PROGRESS NOTE(EDUCATION) - ASSESSMENT
89F with PMH of HTN, HLD, [place], and peripheral vascular disease presenting to the ED for elevated LFTs over the past 3 weeks with symptoms of painless jaundice and itchiness, found to have elevated Bilirubin and Alk Phos with CT findings of CBD dilatation iso pancreatic mass, admitted for further work-up and management.       Problem/Plan - 1:  ·  Problem: Pancreatic mass.   ·  Plan: Presents with generalized malaise, weight loss, jaundice and itchiness. Found to have pancreatic mass on CT imaging with concern for adenocarcinoma  - CT imaging with 1.4 cm mass within uncinate process of pancreas w/ marked pancreatic and biliary ductal dilatation, suspicious for adenocarcinoma. Abuts portosplenic confluence but otherwise no vascular involvement identified. Further evaluation with endoscopic US is recommended. No evidence of metastatic disease  - also mass-like nonspecific fullness of pancreatic head  - patient denies pulmonary symptoms regarding chest involvement  - lipase elevated 371 however patient without significant pain currently. Although CT does show pancreatic nonspecific findings, will be conservative with fluids in setting of extensively calcified aortic valve as well as systolic murmur on exam and patient's current lack of abdominal symptoms. Unclear if representing pancreatitis/mass on CT.  - GOC initiated with patient and family, amenable to symptom-relief and possible biopsy, pt does not want treatment for cancer   - GI emailed for EUS and possible biopsy, will f/u regarding possible palliative stent for symptom relief.     Problem/Plan - 2:  ·  Problem: Cholestasis.   ·  Plan: Presents with pruritis, jaundice, and elevated Bilirubin, Alk Phos, and AST, likely in setting of obstruction/compression by pancreatic mass  - CT imaging findings as above  - R factor 0.3, cholestatic  - No leukocytosis, no abdominal pain nor tenderness, no fevers/chills lately, no diarrhea nor signs/symptoms of possible colitis mentioned on CT  - sequelae of chronic cholecystitis likely iso above CT findings  - hepatitis panel negative   - GI to stent following cardiac clearance     Problem/Plan - 3:  ·  Problem: Pruritus.   ·  Plan: Likely iso cholestasis given above. No rashes or lesions visualized.   - can cont with antihistamine to assess if improvement  - trial Cholestyramine BID  - Limit benadryl due to sedating effects  - calamine lotion.     Problem/Plan - 4:  ·  Problem: UTI (urinary tract infection).   ·  Plan: UA with WBC and many bacteria. Endorses burning with urination  - CT abd/pelvis with findings of mild thickening of the bladder base with prominence of the proximal urethra  - Ceftriaxone for simple cystitis  - f/u UCx, tailor antibiotics.    Problem/Plan - 5:  ·  Problem: Hyponatremia.   ·  Plan: Na 123, SOsm 266, UOsm 637, [place] 147, previous TSH normal. Possible SIADH vs mineralocorticoid deficiency  - Serum AM cortisol  - Consider repletion with 3% saline  - Continue to monitor serum electrolytes     Problem/Plan - 6:  ·  Problem: Hypokalemia.   ·  Plan: K 3.2 on admission as well as mild hyponatremia on admission, likely iso poor PO intake given poor appetite and weight loss  - cont to trend electrolytes and replete PRN.  - K currently within normal limits but downtrending      Problem/Plan - 7:  ·  Problem: Aortic stenosis.   ·  Plan: CT findings extensive aortic valve leaflet calcifications, systolic murmur appreciated on exam  - denies any significant change or decrease in functional status but baseline functional status is low given her age  - no significant swelling nor SOB at rest  - TTE results show EF 63%, moderate MR, Moderate-severe AS  - Cardiology clearance prior to GI stenting     Problem/Plan - 8:  ·  Problem: Hypertension.   ·  Plan: On Diovan and Natrilix at home  - cont with home valsartan.     Problem/Plan - 9:  ·  Problem: Need for prophylactic measure.   ·  Plan: DVT ppx: Lovenox  Diet: DASH/TLC, no coffee/fish  PT c/s    GOC: DNR/intubate per discussion overnight (unclear if time of night affected discussion). Was DNR/DNI per family members. At this time family does not wish to change MOLST until speaking more w/ patient.  **Patient practicing JW, WILL NOT ACCEPT BLOOD PRODUCTS**   89F with PMH of HTN, HLD, [place], and peripheral vascular disease presenting to the ED for elevated LFTs over the past 3 weeks with symptoms of painless jaundice and itchiness, found to have elevated Bilirubin and Alk Phos with CT findings of CBD dilatation iso pancreatic mass, admitted for further work-up and management.       Problem/Plan - 1:  ·  Problem: Pancreatic mass.   ·  Plan: Presents with generalized malaise, weight loss, jaundice and itchiness. Found to have pancreatic mass on CT imaging with concern for adenocarcinoma  - CT imaging with 1.4 cm mass within uncinate process of pancreas w/ marked pancreatic and biliary ductal dilatation, suspicious for adenocarcinoma. Abuts portosplenic confluence but otherwise no vascular involvement identified. Further evaluation with endoscopic US is recommended. No evidence of metastatic disease  - also mass-like nonspecific fullness of pancreatic head  - patient denies pulmonary symptoms regarding chest involvement  - lipase elevated 371 however patient without significant pain currently. Although CT does show pancreatic nonspecific findings, will be conservative with fluids in setting of extensively calcified aortic valve as well as systolic murmur on exam and patient's current lack of abdominal symptoms. Unclear if representing pancreatitis/mass on CT.  - GOC initiated with patient and family, amenable to symptom-relief and possible biopsy, pt does not want treatment for cancer   - GI emailed for EUS and possible biopsy, will f/u regarding possible palliative stent for symptom relief.     Problem/Plan - 2:  ·  Problem: Cholestasis.   ·  Plan: Presents with pruritis, jaundice, and elevated Bilirubin, Alk Phos, and AST, likely in setting of obstruction/compression by pancreatic mass  - CT imaging findings as above  - R factor 0.3, cholestatic  - No leukocytosis, no abdominal pain nor tenderness, no fevers/chills lately, no diarrhea nor signs/symptoms of possible colitis mentioned on CT  - sequelae of chronic cholecystitis likely iso above CT findings  - hepatitis panel negative   - GI to stent following cardiac clearance     Problem/Plan - 3:  ·  Problem: Pruritus.   ·  Plan: Likely iso cholestasis given above. No rashes or lesions visualized.   - can cont with antihistamine to assess if improvement  - trial Cholestyramine BID  - Limit benadryl due to sedating effects  - calamine lotion.     Problem/Plan - 4:  ·  Problem: UTI (urinary tract infection).   ·  Plan: UA with WBC and many bacteria. Endorses burning with urination  - CT abd/pelvis with findings of mild thickening of the bladder base with prominence of the proximal urethra  - Ceftriaxone for simple cystitis  - f/u UCx, tailor antibiotics.    Problem/Plan - 5:  ·  Problem: Hyponatremia.   ·  Plan: Na 123, SOsm 266, UOsm 637, [place] 147, previous TSH normal. Possible SIADH vs mineralocorticoid deficiency  - Serum AM cortisol  - Repletion with salt tabs 1 g bid  - Continue to monitor serum electrolytes  -d/c dash diet restart normal diet     Problem/Plan - 6:  ·  Problem: Hypokalemia.   ·  Plan: K 3.2 on admission as well as mild hyponatremia on admission, likely iso poor PO intake given poor appetite and weight loss  - cont to trend electrolytes and replete PRN.  - K currently within normal limits but downtrending      Problem/Plan - 7:  ·  Problem: Aortic stenosis.   ·  Plan: CT findings extensive aortic valve leaflet calcifications, systolic murmur appreciated on exam  - denies any significant change or decrease in functional status but baseline functional status is low given her age  - no significant swelling nor SOB at rest  - TTE results show EF 63%, moderate MR, Moderate-severe AS  - Cardiology clearance prior to GI stenting     Problem/Plan - 8:  ·  Problem: Hypertension.   ·  Plan: On Diovan and Natrilix at home  - cont with home valsartan.     Problem/Plan - 9:  ·  Problem: Need for prophylactic measure.   ·  Plan: DVT ppx: Lovenox  Diet: DASH/TLC, no coffee/fish  PT c/s    GOC: DNR/intubate per discussion overnight (unclear if time of night affected discussion). Was DNR/DNI per family members. At this time family does not wish to change MOLST until speaking more w/ patient.  **Patient practicing JW, WILL NOT ACCEPT BLOOD PRODUCTS**   89F with PMH of HTN, HLD, [place], and peripheral vascular disease presenting to the ED for elevated LFTs over the past 3 weeks with symptoms of painless jaundice and itchiness, found to have elevated Bilirubin and Alk Phos with CT findings of CBD dilatation iso pancreatic mass, admitted for further work-up and management.       Problem/Plan - 1:  ·  Problem: Pancreatic mass.   ·  Plan: Presents with generalized malaise, weight loss, jaundice and itchiness. Found to have pancreatic mass on CT imaging with concern for adenocarcinoma  - CT imaging with 1.4 cm mass within uncinate process of pancreas w/ marked pancreatic and biliary ductal dilatation, suspicious for adenocarcinoma. Abuts portosplenic confluence but otherwise no vascular involvement identified. Further evaluation with endoscopic US is recommended. No evidence of metastatic disease  - also mass-like nonspecific fullness of pancreatic head  - patient denies pulmonary symptoms regarding chest involvement  - lipase elevated 371 however patient without significant pain currently. Although CT does show pancreatic nonspecific findings, will be conservative with fluids in setting of extensively calcified aortic valve as well as systolic murmur on exam and patient's current lack of abdominal symptoms. Unclear if representing pancreatitis/mass on CT.  - GOC initiated with patient and family, amenable to symptom-relief and possible biopsy, pt does not want treatment for cancer   - GI emailed for EUS and possible biopsy, will f/u regarding possible palliative stent for symptom relief.     Problem/Plan - 2:  ·  Problem: Cholestasis.   ·  Plan: Presents with pruritis, jaundice, and elevated Bilirubin, Alk Phos, and AST, likely in setting of obstruction/compression by pancreatic mass  - CT imaging findings as above  - R factor 0.3, cholestatic  - No leukocytosis, no abdominal pain nor tenderness, no fevers/chills lately, no diarrhea nor signs/symptoms of possible colitis mentioned on CT  - sequelae of chronic cholecystitis likely iso above CT findings  - hepatitis panel negative   - GI to stent following cardiac clearance     Problem/Plan - 3:  ·  Problem: Pruritus.   ·  Plan: Likely iso cholestasis given above. No rashes or lesions visualized.   - can cont with antihistamine to assess if improvement  - trial Cholestyramine BID  - Limit benadryl due to sedating effects  - calamine lotion.     Problem/Plan - 4:  ·  Problem: UTI (urinary tract infection).   ·  Plan: UA with WBC and many bacteria. Endorses burning with urination  - CT abd/pelvis with findings of mild thickening of the bladder base with prominence of the proximal urethra  - Ceftriaxone for simple cystitis  - f/u UCx, tailor antibiotics.    Problem/Plan - 5:  ·  Problem: Hyponatremia.   ·  Plan: Na 123, SOsm 266, UOsm 637, [place] 147, previous TSH normal. Possible SIADH vs mineralocorticoid deficiency  - Serum AM cortisol  - Repletion with salt tabs 1 g tid  - Continue to monitor serum electrolytes  -d/c dash diet restart normal diet     Problem/Plan - 6:  ·  Problem: Hypokalemia.   ·  Plan: K 3.2 on admission as well as mild hyponatremia on admission, likely iso poor PO intake given poor appetite and weight loss  - cont to trend electrolytes and replete PRN.  - K currently within normal limits but downtrending      Problem/Plan - 7:  ·  Problem: Aortic stenosis.   ·  Plan: CT findings extensive aortic valve leaflet calcifications, systolic murmur appreciated on exam  - denies any significant change or decrease in functional status but baseline functional status is low given her age  - no significant swelling nor SOB at rest  - TTE results show EF 63%, moderate MR, Moderate-severe AS  - Cardiology clearance prior to GI stenting     Problem/Plan - 8:  ·  Problem: Hypertension.   ·  Plan: On Diovan and Natrilix at home  - cont with home valsartan.     Problem/Plan - 9:  ·  Problem: Need for prophylactic measure.   ·  Plan: DVT ppx: Lovenox  Diet: DASH/TLC, no coffee/fish  PT c/s    GOC: DNR/intubate per discussion overnight (unclear if time of night affected discussion). Was DNR/DNI per family members. At this time family does not wish to change MOLST until speaking more w/ patient.  **Patient practicing JW, WILL NOT ACCEPT BLOOD PRODUCTS**

## 2024-04-26 NOTE — PROGRESS NOTE ADULT - PROBLEM SELECTOR PLAN 1
Presents with generalized malaise, weight loss, jaundice and itchiness. Found to have pancreatic mass on CT imaging with concern for adenocarcinoma  - CT imaging with 1.4 cm mass within uncinate process of pancreas w/ marked pancreatic and biliary ductal dilatation, suspicious for adenocarcinoma. Abuts portosplenic confluence but otherwise no vascular involvement identified. Further evaluation with endoscopic US is recommended. No evidence of metastatic disease  - also mass-like nonspecific fullness of pancreatic head  - patient denies pulmonary symptoms regarding chest involvement  - lipase elevated 371 however patient without significant pain currently. Although CT does show pancreatic nonspecific findings, will be conservative with fluids in setting of extensively calcified aortic valve as well as systolic murmur on exam and patient's current lack of abdominal symptoms. Unclear if representing pancreatitis/mass on CT.  - GOC initiated with patient and family, amenable to symptom-relief and possible biopsy, pt does not want treatment for cancer   - GI emailed for EUS and possible biopsy, will f/u regarding possible palliative stent for symptom relief Presents with generalized malaise, weight loss, jaundice and itchiness. Found to have 1.4cm pancreatic mass on CT imaging with marked pancreatic and biliary ductal dilation, concern for adenocarcinoma. Also with mass-like nonspecific fullness of pancreatic head.  - Patient denies pulmonary symptoms regarding chest involvement  - Lipase elevated to 371, however CT with nonspecific findings and patient without significant pain currently  - GOC initiated with patient and family, amenable to symptom-relief and possible biopsy, pt does not want treatment for cancer, amenable to hospice care  - GI consulted, recs appreciated  - Scheduled for ERCP and possible biliary stent placement 4/26 - cancelled due to hyponatremia

## 2024-04-26 NOTE — PROGRESS NOTE ADULT - PROBLEM SELECTOR PLAN 8
DVT ppx: Lovenox  Diet: DASH/TLC, no coffee/fish  PT c/s    GOC: DNR/intubate per discussion overnight (unclear if time of night affected discussion). Was DNR/DNI per family members. At this time family does not wish to change MOLST until speaking more w/ patient UA with WBC and many bacteria. Endorses burning with urination. RESOLVED  - CT abd/pelvis with findings of mild thickening of the bladder base with prominence of the proximal urethra  - Ceftriaxone for simple cystitis

## 2024-04-26 NOTE — PROGRESS NOTE ADULT - ASSESSMENT
89F with PMH of HTN, HLD, [place], and peripheral vascular disease presenting to the ED for elevated LFTs over the past 3 weeks with symptoms of painless jaundice and itchiness, found to have elevated Bilirubin and Alk Phos with CT findings of CBD dilatation iso pancreatic mass, admitted for further work-up and management.

## 2024-04-27 ENCOUNTER — TRANSCRIPTION ENCOUNTER (OUTPATIENT)
Age: 89
End: 2024-04-27

## 2024-04-27 LAB
-  AMOXICILLIN/CLAVULANIC ACID: SIGNIFICANT CHANGE UP
-  AMPICILLIN/SULBACTAM: SIGNIFICANT CHANGE UP
-  AMPICILLIN: SIGNIFICANT CHANGE UP
-  AZTREONAM: SIGNIFICANT CHANGE UP
-  CEFAZOLIN: SIGNIFICANT CHANGE UP
-  CEFEPIME: SIGNIFICANT CHANGE UP
-  CEFOXITIN: SIGNIFICANT CHANGE UP
-  CEFTRIAXONE: SIGNIFICANT CHANGE UP
-  CEFUROXIME: SIGNIFICANT CHANGE UP
-  CIPROFLOXACIN: SIGNIFICANT CHANGE UP
-  ERTAPENEM: SIGNIFICANT CHANGE UP
-  GENTAMICIN: SIGNIFICANT CHANGE UP
-  IMIPENEM: SIGNIFICANT CHANGE UP
-  LEVOFLOXACIN: SIGNIFICANT CHANGE UP
-  MEROPENEM: SIGNIFICANT CHANGE UP
-  NITROFURANTOIN: SIGNIFICANT CHANGE UP
-  PIPERACILLIN/TAZOBACTAM: SIGNIFICANT CHANGE UP
-  TOBRAMYCIN: SIGNIFICANT CHANGE UP
-  TRIMETHOPRIM/SULFAMETHOXAZOLE: SIGNIFICANT CHANGE UP
50/50 COAG PANEL: SIGNIFICANT CHANGE UP
ALBUMIN SERPL ELPH-MCNC: 3.1 G/DL — LOW (ref 3.3–5)
ALP SERPL-CCNC: 334 U/L — HIGH (ref 40–120)
ALT FLD-CCNC: 34 U/L — HIGH (ref 4–33)
ANION GAP SERPL CALC-SCNC: 13 MMOL/L — SIGNIFICANT CHANGE UP (ref 7–14)
APTT 50/50 2HOUR INCUB: 32.4 SEC — SIGNIFICANT CHANGE UP (ref 24.5–36.6)
APTT BLD: 31.9 SEC — SIGNIFICANT CHANGE UP (ref 27.5–37.4)
APTT BLD: 39.6 SEC — HIGH (ref 24.5–35.6)
APTT BLD: 39.6 SEC — HIGH (ref 24.5–35.6)
AST SERPL-CCNC: 62 U/L — HIGH (ref 4–32)
BASOPHILS # BLD AUTO: 0.05 K/UL — SIGNIFICANT CHANGE UP (ref 0–0.2)
BASOPHILS NFR BLD AUTO: 0.9 % — SIGNIFICANT CHANGE UP (ref 0–2)
BILIRUB SERPL-MCNC: 19.8 MG/DL — HIGH (ref 0.2–1.2)
BUN SERPL-MCNC: 10 MG/DL — SIGNIFICANT CHANGE UP (ref 7–23)
CALCIUM SERPL-MCNC: 8.9 MG/DL — SIGNIFICANT CHANGE UP (ref 8.4–10.5)
CHLORIDE SERPL-SCNC: 96 MMOL/L — LOW (ref 98–107)
CO2 SERPL-SCNC: 18 MMOL/L — LOW (ref 22–31)
CORTIS AM PEAK SERPL-MCNC: 17.9 UG/DL — SIGNIFICANT CHANGE UP (ref 6–18.4)
CREAT SERPL-MCNC: 0.37 MG/DL — LOW (ref 0.5–1.3)
CULTURE RESULTS: ABNORMAL
EGFR: 95 ML/MIN/1.73M2 — SIGNIFICANT CHANGE UP
EOSINOPHIL # BLD AUTO: 0.12 K/UL — SIGNIFICANT CHANGE UP (ref 0–0.5)
EOSINOPHIL NFR BLD AUTO: 2.2 % — SIGNIFICANT CHANGE UP (ref 0–6)
FERRITIN SERPL-MCNC: 1080 NG/ML — HIGH (ref 13–330)
FIBRINOGEN PPP-MCNC: 347 MG/DL — SIGNIFICANT CHANGE UP (ref 200–465)
FOLATE SERPL-MCNC: 11.2 NG/ML — SIGNIFICANT CHANGE UP (ref 3.1–17.5)
GLUCOSE SERPL-MCNC: 119 MG/DL — HIGH (ref 70–99)
HCT VFR BLD CALC: 30 % — LOW (ref 34.5–45)
HGB BLD-MCNC: 10 G/DL — LOW (ref 11.5–15.5)
IANC: 3.25 K/UL — SIGNIFICANT CHANGE UP (ref 1.8–7.4)
IMM GRANULOCYTES NFR BLD AUTO: 0.5 % — SIGNIFICANT CHANGE UP (ref 0–0.9)
INR BLD: 1.61 RATIO — HIGH (ref 0.85–1.18)
IRON SATN MFR SERPL: 108 UG/DL — SIGNIFICANT CHANGE UP (ref 30–160)
IRON SATN MFR SERPL: 58 % — HIGH (ref 14–50)
LYMPHOCYTES # BLD AUTO: 1.6 K/UL — SIGNIFICANT CHANGE UP (ref 1–3.3)
LYMPHOCYTES # BLD AUTO: 28.8 % — SIGNIFICANT CHANGE UP (ref 13–44)
MAGNESIUM SERPL-MCNC: 1.7 MG/DL — SIGNIFICANT CHANGE UP (ref 1.6–2.6)
MCHC RBC-ENTMCNC: 26.5 PG — LOW (ref 27–34)
MCHC RBC-ENTMCNC: 33.3 GM/DL — SIGNIFICANT CHANGE UP (ref 32–36)
MCV RBC AUTO: 79.1 FL — LOW (ref 80–100)
METHOD TYPE: SIGNIFICANT CHANGE UP
MONOCYTES # BLD AUTO: 0.51 K/UL — SIGNIFICANT CHANGE UP (ref 0–0.9)
MONOCYTES NFR BLD AUTO: 9.2 % — SIGNIFICANT CHANGE UP (ref 2–14)
NEUTROPHILS # BLD AUTO: 3.25 K/UL — SIGNIFICANT CHANGE UP (ref 1.8–7.4)
NEUTROPHILS NFR BLD AUTO: 58.4 % — SIGNIFICANT CHANGE UP (ref 43–77)
NRBC # BLD: 0 /100 WBCS — SIGNIFICANT CHANGE UP (ref 0–0)
NRBC # FLD: 0 K/UL — SIGNIFICANT CHANGE UP (ref 0–0)
ORGANISM # SPEC MICROSCOPIC CNT: ABNORMAL
ORGANISM # SPEC MICROSCOPIC CNT: ABNORMAL
PAT CTL 2H: 33.5 SEC — SIGNIFICANT CHANGE UP (ref 24.5–36.6)
PHOSPHATE SERPL-MCNC: 2.6 MG/DL — SIGNIFICANT CHANGE UP (ref 2.5–4.5)
PLATELET # BLD AUTO: 267 K/UL — SIGNIFICANT CHANGE UP (ref 150–400)
POTASSIUM SERPL-MCNC: 4 MMOL/L — SIGNIFICANT CHANGE UP (ref 3.5–5.3)
POTASSIUM SERPL-SCNC: 4 MMOL/L — SIGNIFICANT CHANGE UP (ref 3.5–5.3)
PROT SERPL-MCNC: 7.2 G/DL — SIGNIFICANT CHANGE UP (ref 6–8.3)
PROTHROM AB SERPL-ACNC: 17.8 SEC — HIGH (ref 9.5–13)
PT 100%: 17.8 SEC — HIGH (ref 9.5–13)
PT 50/50: 12.3 SEC — SIGNIFICANT CHANGE UP (ref 9.5–14)
RBC # BLD: 3.26 M/UL — LOW (ref 3.8–5.2)
RBC # BLD: 3.26 M/UL — LOW (ref 3.8–5.2)
RBC # FLD: 17.7 % — HIGH (ref 10.3–14.5)
RETICS #: 69.4 K/UL — SIGNIFICANT CHANGE UP (ref 25–125)
RETICS/RBC NFR: 2.1 % — SIGNIFICANT CHANGE UP (ref 0.5–2.5)
SODIUM SERPL-SCNC: 127 MMOL/L — LOW (ref 135–145)
SPECIMEN SOURCE: SIGNIFICANT CHANGE UP
THROMBIN TIME: 22.3 SEC — SIGNIFICANT CHANGE UP (ref 16–26)
TIBC SERPL-MCNC: 185 UG/DL — LOW (ref 220–430)
UIBC SERPL-MCNC: 77 UG/DL — LOW (ref 110–370)
VIT B12 SERPL-MCNC: 1327 PG/ML — HIGH (ref 200–900)
WBC # BLD: 5.56 K/UL — SIGNIFICANT CHANGE UP (ref 3.8–10.5)
WBC # FLD AUTO: 5.56 K/UL — SIGNIFICANT CHANGE UP (ref 3.8–10.5)

## 2024-04-27 PROCEDURE — 99232 SBSQ HOSP IP/OBS MODERATE 35: CPT | Mod: GC

## 2024-04-27 RX ADMIN — PREGABALIN 1000 MICROGRAM(S): 225 CAPSULE ORAL at 12:48

## 2024-04-27 RX ADMIN — CHLORHEXIDINE GLUCONATE 1 APPLICATION(S): 213 SOLUTION TOPICAL at 06:34

## 2024-04-27 RX ADMIN — CHOLESTYRAMINE 4 GRAM(S): 4 POWDER, FOR SUSPENSION ORAL at 21:47

## 2024-04-27 RX ADMIN — CALAMINE AND ZINC OXIDE AND PHENOL 1 APPLICATION(S): 160; 10 LOTION TOPICAL at 12:46

## 2024-04-27 RX ADMIN — Medication 1 MILLIGRAM(S): at 12:46

## 2024-04-27 RX ADMIN — Medication 100 MILLIGRAM(S): at 12:51

## 2024-04-27 RX ADMIN — SODIUM CHLORIDE 1 GRAM(S): 9 INJECTION INTRAMUSCULAR; INTRAVENOUS; SUBCUTANEOUS at 06:34

## 2024-04-27 RX ADMIN — CHOLESTYRAMINE 4 GRAM(S): 4 POWDER, FOR SUSPENSION ORAL at 09:22

## 2024-04-27 RX ADMIN — SODIUM CHLORIDE 1 GRAM(S): 9 INJECTION INTRAMUSCULAR; INTRAVENOUS; SUBCUTANEOUS at 21:47

## 2024-04-27 RX ADMIN — SODIUM CHLORIDE 1 GRAM(S): 9 INJECTION INTRAMUSCULAR; INTRAVENOUS; SUBCUTANEOUS at 13:47

## 2024-04-27 NOTE — DISCHARGE NOTE PROVIDER - NSFOLLOWUPCLINICS_GEN_ALL_ED_FT
[facility]  Radiology  [phone] [place], [place] [address]  Phone: [phone]  Fax:   Follow Up Time: Routine

## 2024-04-27 NOTE — PROGRESS NOTE ADULT - PROBLEM SELECTOR PLAN 2
Patient with downtrending hgb, likely in the setting of chronic disease. Patient with 2 bright red BM on 4/26.  - No blood products; patient is Gnosticist.  - GI following, recs appreciated  - Heme consulted, recs appreciated  - F/u iron studies, B12, folate, retic count  - Daily folate supplementation, consider Epo  - D/c lovenox, anti-HTNs  - Stool count Patient with downtrending hgb, likely in the setting of chronic disease. Patient with 2 bright red BM on 4/26.  - No blood products; patient is [employer].  - GI following, recs appreciated  - Heme consulted, recs appreciated  - F/u iron studies, B12, folate, retic count - indicative of anemia of chronic disease  - Daily folate supplementation, no epo due to malignancy  - D/c lovenox, anti-HTNs  - Stool count

## 2024-04-27 NOTE — PROGRESS NOTE ADULT - PROBLEM SELECTOR PLAN 5
Patient hyponatremic to 129 on admission, as low as 123 on 4/26. Likely in the setting of malignancy, decreased PO intake, vs SIADH.  - Serum osm: 266, urine osm: 637, urine Na: 147.  - TSH wnl, f/u AM cortisol  - Salt tabs 1g TID  - Optimize glucose control  - Fluid restriction to 1L, d/c DASH diet  - Caution not to overcorrect >6-8 within 24h period

## 2024-04-27 NOTE — PROGRESS NOTE ADULT - PROBLEM SELECTOR PLAN 1
Presents with generalized malaise, weight loss, jaundice and itchiness. Found to have 1.4cm pancreatic mass on CT imaging with marked pancreatic and biliary ductal dilation, concern for adenocarcinoma. Also with mass-like nonspecific fullness of pancreatic head.  - Patient denies pulmonary symptoms regarding chest involvement  - Lipase elevated to 371, however CT with nonspecific findings and patient without significant pain currently  - GOC initiated with patient and family, amenable to symptom-relief and possible biopsy, pt does not want treatment for cancer, amenable to hospice care  - GI consulted, recs appreciated  - Scheduled for ERCP and possible biliary stent placement 4/26 - cancelled due to hyponatremia Presents with generalized malaise, weight loss, jaundice and itchiness. Found to have 1.4cm pancreatic mass on CT imaging with marked pancreatic and biliary ductal dilation, concern for adenocarcinoma. Also with mass-like nonspecific fullness of pancreatic head.  - Patient denies pulmonary symptoms regarding chest involvement  - Lipase elevated to 371, however CT with nonspecific findings and patient without significant pain currently  - GOC initiated with patient and family, amenable to symptom-relief and possible biopsy, pt does not want treatment for cancer, amenable to hospice care  - GI consulted, recs appreciated  - Scheduled for ERCP and possible biliary stent placement 4/26 - cancelled due to hyponatremia. Rescheduled for 4/29

## 2024-04-27 NOTE — DISCHARGE NOTE PROVIDER - NSDCMRMEDTOKEN_GEN_ALL_CORE_FT
Diovan 160 mg oral tablet: 1 tab(s) orally once a day  Herbal Supplements: takes a few supplements once a day  indapamide 1.25 mg oral tablet: 1 tab(s) orally once a day   calamine topical lotion: Apply topically to affected area once a day 1 Apply topically to affected area once a day  cyanocobalamin 1000 mcg oral tablet: 1 tab(s) orally once a day  folic acid 1 mg oral tablet: 1 tab(s) orally once a day  Herbal Supplements: takes a few supplements once a day  melatonin 3 mg oral tablet: 1 tab(s) orally once a day (at bedtime) as needed for Insomnia  mirtazapine 7.5 mg oral tablet: 1 tab(s) orally once a day (at bedtime)  thiamine 100 mg oral tablet: 1 tab(s) orally once a day   calamine topical lotion: Apply topically to affected area once a day 1 Apply topically to affected area once a day  cholestyramine 4 g/9 g oral powder for reconstitution: 4 gram(s) orally 2 times a day  cyanocobalamin 1000 mcg oral tablet: 1 tab(s) orally once a day  folic acid 1 mg oral tablet: 1 tab(s) orally once a day  Herbal Supplements: takes a few supplements once a day  melatonin 3 mg oral tablet: 1 tab(s) orally once a day (at bedtime) as needed for Insomnia  mirtazapine 7.5 mg oral tablet: 1 tab(s) orally once a day (at bedtime)  thiamine 100 mg oral tablet: 1 tab(s) orally once a day

## 2024-04-27 NOTE — DISCHARGE NOTE PROVIDER - CARE PROVIDER_API CALL
Henry Sen)  V[facility] Radiology  [address] [place] [id number]  Phone: [phone]  Fax: [phone]  Follow Up Time: Routine

## 2024-04-27 NOTE — DISCHARGE NOTE PROVIDER - CARE PROVIDERS DIRECT ADDRESSES
[email or web address]

## 2024-04-27 NOTE — DISCHARGE NOTE PROVIDER - NSDCCPCAREPLAN_GEN_ALL_CORE_FT
PRINCIPAL DISCHARGE DIAGNOSIS  Diagnosis: Pancreatic cancer  Assessment and Plan of Treatment: On CT imaging, we noted a pancreatic mass that we believe is that cause of your symptoms. After discussion with your family, you have decided to not biopsy this mass to undergo treatment, and instead we have assisted you in setting up home hospice. Our GI team attempted to place a biliary stent but were unable to. Interventional radiology was offering an alternative procedure requiring a drain, which you have chosen to defer at this time. Please make sure to follow-up with them outpatient when you feel you may need this drain placed.      SECONDARY DISCHARGE DIAGNOSES  Diagnosis: Hyponatremia  Assessment and Plan of Treatment: While you were admitted we noticed on multiple labs that your sodium was low. Please make sure to see your doctor if you start to experience symptoms of low sodium, which may include confusion, dizziness, and lightheadedness.

## 2024-04-27 NOTE — DISCHARGE NOTE PROVIDER - HOSPITAL COURSE
HPI:  Patient is a 89 year-old female with history of HTN, HLD, [place], and peripheral vascular disease presenting to the ED for elevated LFTs over the past 3 weeks with symptoms of painless jaundice and itchiness. Patient recently returned this morning from [facility] after spending around a year outside the country. Prior to her return, she went to a doctor due to general malaise, itchiness, and just overall generally "feeling sick". She was found to have elevated LFTs and bilirubin on those labs (unclear on exact values). She then also noticed jaundice as well as itchiness. Friend at bedside reports that patient appears to have lost weight compared to before and patient states she just doesn't feel like eating as much. She otherwise denies fevers/chills, headaches, chest pain, difficulty breathing, abdominal pain, constipation/diarrhea, dysuria, recent sick contacts.     In the ED, patient vitals T 98.5F, HR 88, /91, and O2 saturation of 99% on RA with RR 18. Labs notable for elevated lipase, elevated Tbili, elevated Alk Phos and AST, and mild electrolyte abnormalities. CXR with clear lungs. CT abd/pelvis with main findings of biliary dilatation as well as pancreatic mass. Received Benadryl, potassium supplementation, and 500cc NS bolus in the ED.  (23 Apr 2024 22:50)    Hospital Course:      Important Medication Changes and Reason:  - N/A    Active or Pending Issues Requiring Follow-up:  - Code status  - Pancreatic mass  - Anemia    Advanced Directives:   [ ] Full code  [X] DNR/Intubate  [ ] Hospice    Discharge Diagnoses:  - Pancreatic mass HPI:  Patient is a 89 year-old female with history of HTN, HLD, [place], and peripheral vascular disease presenting to the ED for elevated LFTs over the past 3 weeks with symptoms of painless jaundice and itchiness. Patient recently returned this morning from [facility] after spending around a year outside the country. Prior to her return, she went to a doctor due to general malaise, itchiness, and just overall generally "feeling sick". She was found to have elevated LFTs and bilirubin on those labs (unclear on exact values). She then also noticed jaundice as well as itchiness. Friend at bedside reports that patient appears to have lost weight compared to before and patient states she just doesn't feel like eating as much. She otherwise denies fevers/chills, headaches, chest pain, difficulty breathing, abdominal pain, constipation/diarrhea, dysuria, recent sick contacts.     In the ED, patient vitals T 98.5F, HR 88, /91, and O2 saturation of 99% on RA with RR 18. Labs notable for elevated lipase, elevated Tbili, elevated Alk Phos and AST, and mild electrolyte abnormalities. CXR with clear lungs. CT abd/pelvis with main findings of biliary dilatation as well as pancreatic mass. Received Benadryl, potassium supplementation, and 500cc NS bolus in the ED.    Hospital Course:  Patient underwent ERCP for attempted biliary stent placement; unsuccessful due to duct stenosis. Percutaneous biliary drain was offered inpatient although patient stating she would like to defer at this time. Patient experiencing partial relief in itching; treated with cholestyramine and mirtazipine. After extensive GOC discussions, patient and family have decided they would like to pursue home hospice and defer biopsy or treatment for suspected malignancy.      Important Medication Changes and Reason:  - Cholestyramine  - Mirtazipine 7.5mg    Active or Pending Issues Requiring Follow-up:  - Code status  - Pancreatic mass  - Anemia  - Hyponatremia    Advanced Directives:   [ ] Full code  [X] DNR/Intubate  [ ] Hospice    Discharge Diagnoses:  - Pancreatic mass HPI:  Patient is a 89 year-old female with history of HTN, HLD, [place], and peripheral vascular disease presenting to the ED for elevated LFTs over the past 3 weeks with symptoms of painless jaundice and itchiness. Patient recently returned this morning from [facility] after spending around a year outside the country. Prior to her return, she went to a doctor due to general malaise, itchiness, and just overall generally "feeling sick". She was found to have elevated LFTs and bilirubin on those labs (unclear on exact values). She then also noticed jaundice as well as itchiness. Friend at bedside reports that patient appears to have lost weight compared to before and patient states she just doesn't feel like eating as much. She otherwise denies fevers/chills, headaches, chest pain, difficulty breathing, abdominal pain, constipation/diarrhea, dysuria, recent sick contacts.     In the ED, patient vitals T 98.5F, HR 88, /91, and O2 saturation of 99% on RA with RR 18. Labs notable for elevated lipase, elevated Tbili, elevated Alk Phos and AST, and mild electrolyte abnormalities. CXR with clear lungs. CT abd/pelvis with main findings of biliary dilatation as well as pancreatic mass. Received Benadryl, potassium supplementation, and 500cc NS bolus in the ED.    Hospital Course:  Patient underwent ERCP for attempted biliary stent placement; unsuccessful due to duct stenosis. Percutaneous biliary drain was offered inpatient although patient stating she would like to defer at this time. Patient experiencing partial relief in itching; treated with cholestyramine and mirtazapine. After extensive GOC discussions, patient and family have decided they would like to pursue home hospice and defer biopsy or treatment for suspected malignancy.      Important Medication Changes and Reason:  - Cholestyramine  - Mirtazapine 7.5mg    Active or Pending Issues Requiring Follow-up:  - Pancreatic mass  - Anemia  - Hyponatremia    Advanced Directives:   [ ] Full code  [X] DNR/Intubate  [ ] Hospice    Discharge Diagnoses:  - Pancreatic mass

## 2024-04-27 NOTE — PROGRESS NOTE ADULT - SUBJECTIVE AND OBJECTIVE BOX
Patient is a 89y old  Female who presents with a chief complaint of Pancreatic mass, abnormal lab-work    SUBJECTIVE / OVERNIGHT EVENTS: Patient seen and examined at bedside. Patient with 2 bright red BM during day yesterday.    MEDICATIONS  (STANDING):  calamine/zinc oxide Lotion 1 Application(s) Topical daily  chlorhexidine 2% Cloths 1 Application(s) Topical <User Schedule>  cholestyramine Powder (Sugar-Free) 4 Gram(s) Oral two times a day  cyanocobalamin 1000 MICROGram(s) Oral daily  folic acid 1 milliGRAM(s) Oral daily  sodium chloride 1 Gram(s) Oral three times a day  sodium chloride 0.9%. 1000 milliLiter(s) (100 mL/Hr) IV Continuous <Continuous>  thiamine 100 milliGRAM(s) Oral daily    MEDICATIONS  (PRN):  melatonin 3 milliGRAM(s) Oral at bedtime PRN Insomnia    Vital Signs Last 24 Hrs  T(C): 36.4 (27 Apr 2024 05:49), Max: 37.1 (26 Apr 2024 12:10)  T(F): 97.5 (27 Apr 2024 05:49), Max: 98.8 (26 Apr 2024 13:06)  HR: 66 (27 Apr 2024 05:49) (66 - 89)  BP: 149/66 (27 Apr 2024 05:49) (103/59 - 149/66)  BP(mean): --  RR: 18 (27 Apr 2024 05:49) (18 - 18)  SpO2: 100% (27 Apr 2024 05:49) (100% - 100%)    Parameters below as of 27 Apr 2024 05:49  Patient On (Oxygen Delivery Method): room air    CAPILLARY BLOOD GLUCOSE    I&O's Summary    26 Apr 2024 07:01  -  27 Apr 2024 07:00  --------------------------------------------------------  IN: 200 mL / OUT: 1950 mL / NET: -1750 mL    PHYSICAL EXAM:  GENERAL: NAD, well-developed  HEAD:  Atraumatic, Normocephalic  EYES: +Scleral icterus. EOMI, PERRLA,  NECK: Supple, No JVD  CHEST/LUNG: Clear to auscultation bilaterally; No wheeze  HEART: Regular rate and rhythm; No murmurs, rubs, or gallops  ABDOMEN: Soft, Nontender, Nondistended; Bowel sounds present  EXTREMITIES:  2+ Peripheral Pulses, No clubbing, cyanosis, or edema  PSYCH: AAOx3  NEUROLOGY: non-focal  SKIN: No rashes or lesions    LABS:                        10.3   5.97  )-----------( 286      ( 26 Apr 2024 11:21 )             34.0     04-26    128<L>  |  95<L>  |  10  ----------------------------<  160<H>  4.2   |  20<L>  |  0.41<L>    Ca    9.1      26 Apr 2024 17:13  Phos  2.8     04-26  Mg     1.60     04-26    TPro  7.2  /  Alb  3.0<L>  /  TBili  18.9<H>  /  DBili  15.6<H>  /  AST  57<H>  /  ALT  33  /  AlkPhos  337<H>  04-26    PT/INR - ( 26 Apr 2024 03:00 )   PT: 16.0 sec;   INR: 1.43 ratio       PTT - ( 26 Apr 2024 03:00 )  PTT:39.0 sec    Urinalysis Basic - ( 26 Apr 2024 17:13 )    Color: x / Appearance: x / SG: x / pH: x  Gluc: 160 mg/dL / Ketone: x  / Bili: x / Urobili: x   Blood: x / Protein: x / Nitrite: x   Leuk Esterase: x / RBC: x / WBC x   Sq Epi: x / Non Sq Epi: x / Bacteria: x    RADIOLOGY & ADDITIONAL TESTS:    Imaging Personally Reviewed:    Consultant(s) Notes Reviewed:      Care Discussed with Consultants/Other Providers:    Josep Patel MD, Internal Medicine Resident Patient is a 89y old  Female who presents with a chief complaint of Pancreatic mass, abnormal lab-work    SUBJECTIVE / OVERNIGHT EVENTS: Patient seen and examined at bedside. Patient with 2 bright red BM during day yesterday. No acute concerns this AM, patient denies CP, SOB, palpitations, n/v/d.    MEDICATIONS  (STANDING):  calamine/zinc oxide Lotion 1 Application(s) Topical daily  chlorhexidine 2% Cloths 1 Application(s) Topical <User Schedule>  cholestyramine Powder (Sugar-Free) 4 Gram(s) Oral two times a day  cyanocobalamin 1000 MICROGram(s) Oral daily  folic acid 1 milliGRAM(s) Oral daily  sodium chloride 1 Gram(s) Oral three times a day  sodium chloride 0.9%. 1000 milliLiter(s) (100 mL/Hr) IV Continuous <Continuous>  thiamine 100 milliGRAM(s) Oral daily    MEDICATIONS  (PRN):  melatonin 3 milliGRAM(s) Oral at bedtime PRN Insomnia    Vital Signs Last 24 Hrs  T(C): 36.4 (27 Apr 2024 05:49), Max: 37.1 (26 Apr 2024 12:10)  T(F): 97.5 (27 Apr 2024 05:49), Max: 98.8 (26 Apr 2024 13:06)  HR: 66 (27 Apr 2024 05:49) (66 - 89)  BP: 149/66 (27 Apr 2024 05:49) (103/59 - 149/66)  BP(mean): --  RR: 18 (27 Apr 2024 05:49) (18 - 18)  SpO2: 100% (27 Apr 2024 05:49) (100% - 100%)    Parameters below as of 27 Apr 2024 05:49  Patient On (Oxygen Delivery Method): room air    CAPILLARY BLOOD GLUCOSE    I&O's Summary    26 Apr 2024 07:01  -  27 Apr 2024 07:00  --------------------------------------------------------  IN: 200 mL / OUT: 1950 mL / NET: -1750 mL    PHYSICAL EXAM:  GENERAL: NAD, well-developed  HEAD:  Atraumatic, Normocephalic  EYES: +Scleral icterus. EOMI, PERRLA,  NECK: Supple, No JVD  CHEST/LUNG: Clear to auscultation bilaterally; No wheeze  HEART: Regular rate and rhythm; No murmurs, rubs, or gallops  ABDOMEN: Soft, Nontender, Nondistended; Bowel sounds present  EXTREMITIES:  2+ Peripheral Pulses, No clubbing, cyanosis, or edema  PSYCH: AAOx3  NEUROLOGY: non-focal  SKIN: No rashes or lesions    LABS:                        10.3   5.97  )-----------( 286      ( 26 Apr 2024 11:21 )             34.0     04-26    128<L>  |  95<L>  |  10  ----------------------------<  160<H>  4.2   |  20<L>  |  0.41<L>    Ca    9.1      26 Apr 2024 17:13  Phos  2.8     04-26  Mg     1.60     04-26    TPro  7.2  /  Alb  3.0<L>  /  TBili  18.9<H>  /  DBili  15.6<H>  /  AST  57<H>  /  ALT  33  /  AlkPhos  337<H>  04-26    PT/INR - ( 26 Apr 2024 03:00 )   PT: 16.0 sec;   INR: 1.43 ratio       PTT - ( 26 Apr 2024 03:00 )  PTT:39.0 sec    Urinalysis Basic - ( 26 Apr 2024 17:13 )    Color: x / Appearance: x / SG: x / pH: x  Gluc: 160 mg/dL / Ketone: x  / Bili: x / Urobili: x   Blood: x / Protein: x / Nitrite: x   Leuk Esterase: x / RBC: x / WBC x   Sq Epi: x / Non Sq Epi: x / Bacteria: x    RADIOLOGY & ADDITIONAL TESTS:    Imaging Personally Reviewed:    Consultant(s) Notes Reviewed:      Care Discussed with Consultants/Other Providers:    Josep Patel MD, Internal Medicine Resident

## 2024-04-27 NOTE — PROGRESS NOTE ADULT - PROBLEM SELECTOR PLAN 8
UA with WBC and many bacteria. Endorses burning with urination. RESOLVED  - CT abd/pelvis with findings of mild thickening of the bladder base with prominence of the proximal urethra  - Ceftriaxone for simple cystitis

## 2024-04-27 NOTE — PROGRESS NOTE ADULT - SUBJECTIVE AND OBJECTIVE BOX
Cardiovascular Disease Progress Note  Date of service: 04-27-24 @ 12:03    Overnight events: Bloody BM noted. Patient is in no distress. She states she is frustrated this morning because of the diet she is currently on.   Otherwise review of systems negative    Objective Findings:  T(C): 37 (04-27-24 @ 10:35), Max: 37.1 (04-26-24 @ 12:10)  HR: 74 (04-27-24 @ 10:35) (66 - 89)  BP: 143/67 (04-27-24 @ 10:35) (103/59 - 149/66)  RR: 18 (04-27-24 @ 10:35) (18 - 18)  SpO2: 96% (04-27-24 @ 10:35) (96% - 100%)  Wt(kg): --  Daily     Daily       Physical Exam:  Gen: NAD; Patient resting comfortably  HEENT: EOMI, Normocephalic/ atraumatic  CV: RRR, normal S1 + S2, no m/r/g  Lungs:  Normal respiratory effort; clear to auscultation bilaterally  Abd: soft, non-tender; bowel sounds present  Ext: No edema; warm and well perfused    Telemetry: N/a    Laboratory Data:                        10.0   5.56  )-----------( 267      ( 27 Apr 2024 08:20 )             30.0     04-27    127<L>  |  96<L>  |  10  ----------------------------<  119<H>  4.0   |  18<L>  |  0.37<L>    Ca    8.9      27 Apr 2024 08:20  Phos  2.6     04-27  Mg     1.70     04-27    TPro  7.2  /  Alb  3.1<L>  /  TBili  19.8<H>  /  DBili  x   /  AST  62<H>  /  ALT  34<H>  /  AlkPhos  334<H>  04-27    PT/INR - ( 27 Apr 2024 08:20 )   PT: 17.8 sec;   INR: 1.61 ratio         PTT - ( 27 Apr 2024 08:20 )  PTT:39.6 sec          Inpatient Medications:  MEDICATIONS  (STANDING):  calamine/zinc oxide Lotion 1 Application(s) Topical daily  chlorhexidine 2% Cloths 1 Application(s) Topical <User Schedule>  cholestyramine Powder (Sugar-Free) 4 Gram(s) Oral two times a day  cyanocobalamin 1000 MICROGram(s) Oral daily  folic acid 1 milliGRAM(s) Oral daily  sodium chloride 1 Gram(s) Oral three times a day  sodium chloride 0.9%. 1000 milliLiter(s) (100 mL/Hr) IV Continuous <Continuous>  thiamine 100 milliGRAM(s) Oral daily      Assessment:  89F with PMH of HTN, HLD, [place], and peripheral vascular disease presenting to the ED for elevated LFTs over the past 3 weeks with symptoms of painless jaundice and itchiness    Plan of Care:    #Cardiac risk stratification  - Patient being considered to palliative stenting   - Of note patient has moderate to severe AS on Echo  - Patient is not fluid overloaded on exam  - She is at elevated, but not prohibitive, risk for stent  - Please use light sedation or vasoplegic anesthesia induction judiciously, as the patient is preload dependent in the setting of her valvular disease.   - Please obtain EKG prior to procedure  - If no significant arrhythmias patient may proceed     #HTN  - BP acceptable on current regimen    #HLD  - Statin on hold in setting of transaminitis    #Bloody BM  - Noted 4/27/2024  - H and H currently stable  - Defer to primary team  - Patient is also requesting dietary changes, defer to primary team for further management.     #ACP (advance care planning)-  Advanced care planning was discussed with the patient.  Risks, benefits and alternatives of medical treatment and procedures were discussed in detail and all questions were answered. 30 additional minutes spent addressing advance care plans.    Over 55 minutes spent on total encounter; more than 50% of the visit was spent counseling and/or coordinating care by the attending physician.      Danny Bella,  [facility]  Cardiovascular Disease  [phone] Cardiovascular Disease Progress Note  Date of service: 04-27-24 @ 12:03    Overnight events: Bloody BM noted. Patient is in no distress. She states she is frustrated this morning because of the diet she is currently on.   Otherwise review of systems negative    Objective Findings:  T(C): 37 (04-27-24 @ 10:35), Max: 37.1 (04-26-24 @ 12:10)  HR: 74 (04-27-24 @ 10:35) (66 - 89)  BP: 143/67 (04-27-24 @ 10:35) (103/59 - 149/66)  RR: 18 (04-27-24 @ 10:35) (18 - 18)  SpO2: 96% (04-27-24 @ 10:35) (96% - 100%)  Wt(kg): --  Daily     Daily       Physical Exam:  Gen: NAD; Patient resting comfortably  HEENT: EOMI, Normocephalic/ atraumatic  CV: RRR, normal S1 + S2, no m/r/g  Lungs:  Normal respiratory effort; clear to auscultation bilaterally  Abd: soft, non-tender; bowel sounds present  Ext: No edema; warm and well perfused    Telemetry: N/a    Laboratory Data:                        10.0   5.56  )-----------( 267      ( 27 Apr 2024 08:20 )             30.0     04-27    127<L>  |  96<L>  |  10  ----------------------------<  119<H>  4.0   |  18<L>  |  0.37<L>    Ca    8.9      27 Apr 2024 08:20  Phos  2.6     04-27  Mg     1.70     04-27    TPro  7.2  /  Alb  3.1<L>  /  TBili  19.8<H>  /  DBili  x   /  AST  62<H>  /  ALT  34<H>  /  AlkPhos  334<H>  04-27    PT/INR - ( 27 Apr 2024 08:20 )   PT: 17.8 sec;   INR: 1.61 ratio         PTT - ( 27 Apr 2024 08:20 )  PTT:39.6 sec          Inpatient Medications:  MEDICATIONS  (STANDING):  calamine/zinc oxide Lotion 1 Application(s) Topical daily  chlorhexidine 2% Cloths 1 Application(s) Topical <User Schedule>  cholestyramine Powder (Sugar-Free) 4 Gram(s) Oral two times a day  cyanocobalamin 1000 MICROGram(s) Oral daily  folic acid 1 milliGRAM(s) Oral daily  sodium chloride 1 Gram(s) Oral three times a day  sodium chloride 0.9%. 1000 milliLiter(s) (100 mL/Hr) IV Continuous <Continuous>  thiamine 100 milliGRAM(s) Oral daily      Assessment:  89F with PMH of HTN, HLD, [place], and peripheral vascular disease presenting to the ED for elevated LFTs over the past 3 weeks with symptoms of painless jaundice and itchiness    Plan of Care:    #Cardiac risk stratification  - Patient being considered to palliative stenting   - Of note patient has moderate to severe AS on Echo  - Patient is not fluid overloaded on exam  - She is at elevated, but not prohibitive, risk for stent  - Please use light sedation or vasoplegic anesthesia induction judiciously, as the patient is preload dependent in the setting of her valvular disease.   - Please obtain EKG prior to procedure  - If no significant arrhythmias patient may proceed     #HTN  - BP acceptable on current regimen    #HLD  - Statin on hold in setting of transaminitis    #Bloody BM  - Noted 4/27/2024  - H and H currently stable  - Defer to primary team  - Patient is also requesting dietary changes, defer to primary team for further management.     #ACP (advance care planning)-  Advanced care planning was discussed with the patient.  Risks, benefits and alternatives of medical treatment and procedures were discussed in detail and all questions were answered.   30 additional minutes spent addressing advance care plans.    Complex medical decision making in this patient with active comorbidities.     Over 96 minutes spent on total encounter; more than 50% of the visit was spent counseling and/or coordinating care by the attending physician.      Danny Bella DO [facility]  Cardiovascular Disease  [phone]

## 2024-04-27 NOTE — PROGRESS NOTE ADULT - PROBLEM SELECTOR PLAN 4
Likely iso cholestasis given above. No rashes or lesions visualized.   - Can cont with antihistamine to assess if improvement  - Trial Cholestyramine BID  - Limit benadryl due to sedating effects  - Calamine lotion

## 2024-04-27 NOTE — PROGRESS NOTE ADULT - ASSESSMENT
89F with PMH of HTN, HLD, [place], and peripheral vascular disease presenting to the ED for elevated LFTs over the past 3 weeks with symptoms of painless jaundice and itchiness, found to have elevated Bilirubin and Alk Phos with CT findings of CBD dilatation iso pancreatic mass, admitted for further work-up and management.

## 2024-04-27 NOTE — DISCHARGE NOTE PROVIDER - NSDCCPTREATMENT_GEN_ALL_CORE_FT
PRINCIPAL PROCEDURE  Procedure: CT abdomen & pelvis  Findings and Treatment: IMPRESSION:  There is a very subtle approximately 1.4 cm mass within the uncinate   process of the pancreas with marked pancreatic and biliary ductal   dilatation, suspicious for adenocarcinoma. There is approximately 90   degrees abutment of the portosplenic confluence. Otherwise no significant   vascular involvement identified. Further evaluation with endoscopic   ultrasound is recommended.  There is also masslike fullness of the pancreatic head, which is   nonspecific and may simply represent normal residual pancreatic   parenchyma, there also appears to be focal wall thickening of the   adjacent duodenum. This could also be assessed with upper   endoscopy/endoscopic ultrasound recommended above.  No evidence of metastatic disease within the abdomen or pelvis.  Cholelithiasis.  There is extensive diverticulosis of the entirety of the large bowel and   a long segment of wall thickening of the right hemicolon, which could be   due to underdistention or an underlying colitis in the appropriate   clinical setting.  Extensive aortic valve leaflet calcifications are noted. Correlation for   aortic stenosis is suggested.

## 2024-04-27 NOTE — PROGRESS NOTE ADULT - PROBLEM SELECTOR PLAN 7
CT findings extensive aortic valve leaflet calcifications, systolic murmur appreciated on exam  - denies any significant change or decrease in functional status but baseline functional status is low given her age  - no significant swelling nor SOB at rest  - f/u TTE - mod to severe AS, EF of 63%  - Cardiology consult for cardiac clearance, recs appreciated.

## 2024-04-27 NOTE — DISCHARGE NOTE PROVIDER - NSDCFUADDAPPT_GEN_ALL_CORE_FT
APPTS ARE READY TO BE MADE: [X] YES    Best Family or Patient Contact (if needed):  Additional Information about above appointments (if needed):  1: Please follow up with [facility] after discharge  2: Please schedule follow-up with Interventional Radiology when needed  3:     Other comments or requests:  APPTS ARE READY TO BE MADE: [X] YES    Best Family or Patient Contact (if needed):  Additional Information about above appointments (if needed):  1: Please follow up with [facility] after discharge ([phone])  2: Please schedule follow-up with Interventional Radiology when needed  3:     Other comments or requests:

## 2024-04-27 NOTE — PROGRESS NOTE ADULT - PROBLEM SELECTOR PLAN 3
Presents with pruritis, jaundice, and elevated Bilirubin, Alk Phos, and AST, likely in setting of obstruction/compression by pancreatic mass  - CT imaging findings as above  - R factor 0.3, cholestatic  - No leukocytosis, no abdominal pain nor tenderness, no fevers/chills lately, no diarrhea nor signs/symptoms of possible colitis mentioned on CT  - sequelae of chronic cholecystitis likely iso above CT findings  - hepatitis panel negative   - f/u GI recs regarding possible stent - planned for 4/26 - cancelled 2/2 to hyponatremia

## 2024-04-28 LAB
ALBUMIN SERPL ELPH-MCNC: 3.1 G/DL — LOW (ref 3.3–5)
ALP SERPL-CCNC: 337 U/L — HIGH (ref 40–120)
ALT FLD-CCNC: 36 U/L — HIGH (ref 4–33)
ANION GAP SERPL CALC-SCNC: 15 MMOL/L — HIGH (ref 7–14)
AST SERPL-CCNC: 65 U/L — HIGH (ref 4–32)
BASOPHILS # BLD AUTO: 0.04 K/UL — SIGNIFICANT CHANGE UP (ref 0–0.2)
BASOPHILS NFR BLD AUTO: 0.6 % — SIGNIFICANT CHANGE UP (ref 0–2)
BILIRUB SERPL-MCNC: 20.3 MG/DL — HIGH (ref 0.2–1.2)
BUN SERPL-MCNC: 9 MG/DL — SIGNIFICANT CHANGE UP (ref 7–23)
CALCIUM SERPL-MCNC: 8.9 MG/DL — SIGNIFICANT CHANGE UP (ref 8.4–10.5)
CHLORIDE SERPL-SCNC: 93 MMOL/L — LOW (ref 98–107)
CO2 SERPL-SCNC: 18 MMOL/L — LOW (ref 22–31)
CREAT SERPL-MCNC: 0.37 MG/DL — LOW (ref 0.5–1.3)
EGFR: 95 ML/MIN/1.73M2 — SIGNIFICANT CHANGE UP
EOSINOPHIL # BLD AUTO: 0.04 K/UL — SIGNIFICANT CHANGE UP (ref 0–0.5)
EOSINOPHIL NFR BLD AUTO: 0.6 % — SIGNIFICANT CHANGE UP (ref 0–6)
GLUCOSE SERPL-MCNC: 151 MG/DL — HIGH (ref 70–99)
HCT VFR BLD CALC: 24 % — LOW (ref 34.5–45)
HGB BLD-MCNC: 8.9 G/DL — LOW (ref 11.5–15.5)
IANC: 4.69 K/UL — SIGNIFICANT CHANGE UP (ref 1.8–7.4)
IMM GRANULOCYTES NFR BLD AUTO: 0.5 % — SIGNIFICANT CHANGE UP (ref 0–0.9)
LYMPHOCYTES # BLD AUTO: 1.29 K/UL — SIGNIFICANT CHANGE UP (ref 1–3.3)
LYMPHOCYTES # BLD AUTO: 19.7 % — SIGNIFICANT CHANGE UP (ref 13–44)
MAGNESIUM SERPL-MCNC: 1.6 MG/DL — SIGNIFICANT CHANGE UP (ref 1.6–2.6)
MCHC RBC-ENTMCNC: 30 PG — SIGNIFICANT CHANGE UP (ref 27–34)
MCHC RBC-ENTMCNC: 37 GM/DL — HIGH (ref 32–36)
MCV RBC AUTO: 80.5 FL — SIGNIFICANT CHANGE UP (ref 80–100)
MONOCYTES # BLD AUTO: 0.46 K/UL — SIGNIFICANT CHANGE UP (ref 0–0.9)
MONOCYTES NFR BLD AUTO: 7 % — SIGNIFICANT CHANGE UP (ref 2–14)
NEUTROPHILS # BLD AUTO: 4.69 K/UL — SIGNIFICANT CHANGE UP (ref 1.8–7.4)
NEUTROPHILS NFR BLD AUTO: 71.6 % — SIGNIFICANT CHANGE UP (ref 43–77)
NRBC # BLD: 0 /100 WBCS — SIGNIFICANT CHANGE UP (ref 0–0)
NRBC # FLD: 0 K/UL — SIGNIFICANT CHANGE UP (ref 0–0)
PHOSPHATE SERPL-MCNC: 2.9 MG/DL — SIGNIFICANT CHANGE UP (ref 2.5–4.5)
PLATELET # BLD AUTO: 287 K/UL — SIGNIFICANT CHANGE UP (ref 150–400)
POTASSIUM SERPL-MCNC: 3.6 MMOL/L — SIGNIFICANT CHANGE UP (ref 3.5–5.3)
POTASSIUM SERPL-SCNC: 3.6 MMOL/L — SIGNIFICANT CHANGE UP (ref 3.5–5.3)
PROT SERPL-MCNC: 6.8 G/DL — SIGNIFICANT CHANGE UP (ref 6–8.3)
RBC # BLD: 2.97 M/UL — LOW (ref 3.8–5.2)
RBC # FLD: 17.8 % — HIGH (ref 10.3–14.5)
SODIUM SERPL-SCNC: 126 MMOL/L — LOW (ref 135–145)
WBC # BLD: 6.55 K/UL — SIGNIFICANT CHANGE UP (ref 3.8–10.5)
WBC # FLD AUTO: 6.55 K/UL — SIGNIFICANT CHANGE UP (ref 3.8–10.5)

## 2024-04-28 PROCEDURE — 99232 SBSQ HOSP IP/OBS MODERATE 35: CPT | Mod: GC

## 2024-04-28 RX ORDER — SODIUM CHLORIDE 9 MG/ML
2 INJECTION INTRAMUSCULAR; INTRAVENOUS; SUBCUTANEOUS THREE TIMES A DAY
Refills: 0 | Status: DISCONTINUED | OUTPATIENT
Start: 2024-04-28 | End: 2024-05-01

## 2024-04-28 RX ADMIN — SODIUM CHLORIDE 2 GRAM(S): 9 INJECTION INTRAMUSCULAR; INTRAVENOUS; SUBCUTANEOUS at 21:22

## 2024-04-28 RX ADMIN — SODIUM CHLORIDE 1 GRAM(S): 9 INJECTION INTRAMUSCULAR; INTRAVENOUS; SUBCUTANEOUS at 06:04

## 2024-04-28 RX ADMIN — PREGABALIN 1000 MICROGRAM(S): 225 CAPSULE ORAL at 11:36

## 2024-04-28 RX ADMIN — Medication 100 MILLIGRAM(S): at 11:36

## 2024-04-28 RX ADMIN — CHOLESTYRAMINE 4 GRAM(S): 4 POWDER, FOR SUSPENSION ORAL at 08:49

## 2024-04-28 RX ADMIN — CALAMINE AND ZINC OXIDE AND PHENOL 1 APPLICATION(S): 160; 10 LOTION TOPICAL at 11:37

## 2024-04-28 RX ADMIN — CHLORHEXIDINE GLUCONATE 1 APPLICATION(S): 213 SOLUTION TOPICAL at 06:04

## 2024-04-28 RX ADMIN — SODIUM CHLORIDE 2 GRAM(S): 9 INJECTION INTRAMUSCULAR; INTRAVENOUS; SUBCUTANEOUS at 14:20

## 2024-04-28 RX ADMIN — Medication 3 MILLIGRAM(S): at 01:49

## 2024-04-28 RX ADMIN — CHOLESTYRAMINE 4 GRAM(S): 4 POWDER, FOR SUSPENSION ORAL at 21:22

## 2024-04-28 RX ADMIN — Medication 1 MILLIGRAM(S): at 11:37

## 2024-04-28 NOTE — PROGRESS NOTE ADULT - PROBLEM SELECTOR PLAN 5
Patient hyponatremic to 129 on admission, as low as 123 on 4/26. Likely in the setting of malignancy, decreased PO intake, vs SIADH.  - Serum osm: 266, urine osm: 637, urine Na: 147.  - TSH wnl, f/u AM cortisol  - Salt tabs 1g TID  - Optimize glucose control  - Fluid restriction to 1L, d/c DASH diet  - Caution not to overcorrect >6-8 within 24h period Patient hyponatremic to 129 on admission, as low as 123 on 4/26. Likely in the setting of malignancy, decreased PO intake, vs SIADH.  - Serum osm: 266, urine osm: 637, urine Na: 147.  - TSH wnl, f/u AM cortisol  - Salt tabs 2g TID  - Optimize glucose control  - Fluid restriction to 1L, d/c DASH diet  - Caution not to overcorrect >6-8 within 24h period

## 2024-04-28 NOTE — PROGRESS NOTE ADULT - PROBLEM SELECTOR PLAN 1
Presents with generalized malaise, weight loss, jaundice and itchiness. Found to have 1.4cm pancreatic mass on CT imaging with marked pancreatic and biliary ductal dilation, concern for adenocarcinoma. Also with mass-like nonspecific fullness of pancreatic head.  - Patient denies pulmonary symptoms regarding chest involvement  - Lipase elevated to 371, however CT with nonspecific findings and patient without significant pain currently  - GOC initiated with patient and family, amenable to symptom-relief and possible biopsy, pt does not want treatment for cancer, amenable to hospice care  - GI consulted, recs appreciated  - Scheduled for ERCP and possible biliary stent placement 4/26 - cancelled due to hyponatremia. Rescheduled for 4/29

## 2024-04-28 NOTE — PROGRESS NOTE ADULT - SUBJECTIVE AND OBJECTIVE BOX
Cardiovascular Disease Progress Note  Date of service: 04-28-24 @ 11:36    Overnight events: No acute events overnight.  Patient is in no distress.   Otherwise review of systems negative    Objective Findings:  T(C): 36.6 (04-28-24 @ 06:04), Max: 36.7 (04-28-24 @ 03:05)  HR: 66 (04-28-24 @ 06:04) (63 - 69)  BP: 122/64 (04-28-24 @ 06:04) (111/61 - 154/69)  RR: 18 (04-28-24 @ 06:04) (17 - 18)  SpO2: 97% (04-28-24 @ 06:04) (97% - 100%)  Wt(kg): --  Daily     Daily       Physical Exam:  Gen: NAD; Patient resting comfortably  HEENT: EOMI, Normocephalic/ atraumatic  CV: RRR, normal S1 + S2, no m/r/g  Lungs:  Normal respiratory effort; clear to auscultation bilaterally  Abd: soft, non-tender; bowel sounds present  Ext: No edema; warm and well perfused    Telemetry: N/a    Laboratory Data:                        8.9    6.55  )-----------( 287      ( 28 Apr 2024 07:08 )             24.0     04-28    126<L>  |  93<L>  |  9   ----------------------------<  151<H>  3.6   |  18<L>  |  0.37<L>    Ca    8.9      28 Apr 2024 07:08  Phos  2.9     04-28  Mg     1.60     04-28    TPro  6.8  /  Alb  3.1<L>  /  TBili  20.3<H>  /  DBili  x   /  AST  65<H>  /  ALT  36<H>  /  AlkPhos  337<H>  04-28    PT/INR - ( 27 Apr 2024 08:20 )   PT: 17.8 sec;   INR: 1.61 ratio         PTT - ( 27 Apr 2024 08:20 )  PTT:39.6 sec          Inpatient Medications:  MEDICATIONS  (STANDING):  calamine/zinc oxide Lotion 1 Application(s) Topical daily  chlorhexidine 2% Cloths 1 Application(s) Topical <User Schedule>  cholestyramine Powder (Sugar-Free) 4 Gram(s) Oral two times a day  cyanocobalamin 1000 MICROGram(s) Oral daily  folic acid 1 milliGRAM(s) Oral daily  sodium chloride 2 Gram(s) Oral three times a day  sodium chloride 0.9%. 1000 milliLiter(s) (100 mL/Hr) IV Continuous <Continuous>  thiamine 100 milliGRAM(s) Oral daily      Assessment:  89F with PMH of HTN, HLD, [place], and peripheral vascular disease presenting to the ED for elevated LFTs over the past 3 weeks with symptoms of painless jaundice and itchiness    Plan of Care:    #Cardiac risk stratification  - Patient being considered to palliative stenting   - Of note patient has moderate to severe AS on Echo  - Patient is not fluid overloaded on exam  - She is at elevated, but not prohibitive, risk for stent  - Please use light sedation or vasoplegic anesthesia induction judiciously, as the patient is preload dependent in the setting of her valvular disease.   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     #HTN  - BP acceptable on current regimen    #HLD  - Statin on hold in setting of transaminitis    #Bloody BM  - Noted 4/27/2024  - H and H down trending  - Defer to primary team      Over 55 minutes spent on total encounter; more than 50% of the visit was spent counseling and/or coordinating care by the attending physician.      Danny Bella DO [facility]  Cardiovascular Disease  [phone] Cardiovascular Disease Progress Note  Date of service: 04-28-24 @ 11:36    Overnight events: No acute events overnight.  Patient is in no distress.   Otherwise review of systems negative    Objective Findings:  T(C): 36.6 (04-28-24 @ 06:04), Max: 36.7 (04-28-24 @ 03:05)  HR: 66 (04-28-24 @ 06:04) (63 - 69)  BP: 122/64 (04-28-24 @ 06:04) (111/61 - 154/69)  RR: 18 (04-28-24 @ 06:04) (17 - 18)  SpO2: 97% (04-28-24 @ 06:04) (97% - 100%)  Wt(kg): --  Daily     Daily       Physical Exam:  Gen: NAD; Patient resting comfortably  HEENT: EOMI, Normocephalic/ atraumatic  CV: RRR, normal S1 + S2, no m/r/g  Lungs:  Normal respiratory effort; clear to auscultation bilaterally  Abd: soft, non-tender; bowel sounds present  Ext: No edema; warm and well perfused    Telemetry: N/a    Laboratory Data:                        8.9    6.55  )-----------( 287      ( 28 Apr 2024 07:08 )             24.0     04-28    126<L>  |  93<L>  |  9   ----------------------------<  151<H>  3.6   |  18<L>  |  0.37<L>    Ca    8.9      28 Apr 2024 07:08  Phos  2.9     04-28  Mg     1.60     04-28    TPro  6.8  /  Alb  3.1<L>  /  TBili  20.3<H>  /  DBili  x   /  AST  65<H>  /  ALT  36<H>  /  AlkPhos  337<H>  04-28    PT/INR - ( 27 Apr 2024 08:20 )   PT: 17.8 sec;   INR: 1.61 ratio         PTT - ( 27 Apr 2024 08:20 )  PTT:39.6 sec          Inpatient Medications:  MEDICATIONS  (STANDING):  calamine/zinc oxide Lotion 1 Application(s) Topical daily  chlorhexidine 2% Cloths 1 Application(s) Topical <User Schedule>  cholestyramine Powder (Sugar-Free) 4 Gram(s) Oral two times a day  cyanocobalamin 1000 MICROGram(s) Oral daily  folic acid 1 milliGRAM(s) Oral daily  sodium chloride 2 Gram(s) Oral three times a day  sodium chloride 0.9%. 1000 milliLiter(s) (100 mL/Hr) IV Continuous <Continuous>  thiamine 100 milliGRAM(s) Oral daily      Assessment:  89F with PMH of HTN, HLD, [place], and peripheral vascular disease presenting to the ED for elevated LFTs over the past 3 weeks with symptoms of painless jaundice and itchiness    Plan of Care:    #Cardiac risk stratification  - Patient being considered to palliative stenting   - Of note patient has moderate to severe AS on Echo  - Patient is not fluid overloaded on exam  - She is at elevated, but not prohibitive, risk for stent  - Please use light sedation or vasoplegic anesthesia induction judiciously, as the patient is preload dependent in the setting of her valvular disease.   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     #HTN  - BP acceptable on current regimen    #HLD  - Statin on hold in setting of transaminitis    #Bloody BM  - Noted 4/27/2024  - H and H down trending  - Defer to primary team    Complex medical decision making in this patient with active comorbidities.     Over 85 minutes spent on total encounter; more than 50% of the visit was spent counseling and/or coordinating care by the attending physician.      Danny Bella DO [facility]  Cardiovascular Disease  [phone]

## 2024-04-28 NOTE — PROGRESS NOTE ADULT - ASSESSMENT
89F with PMH of HTN, HLD, [place], and peripheral vascular disease presenting to the ED for elevated LFTs over the past 3 weeks with symptoms of painless jaundice and itchiness, found to have elevated Bilirubin and Alk Phos with CT findings of CBD dilatation iso pancreatic mass, admitted for further work-up and management.

## 2024-04-28 NOTE — PROGRESS NOTE ADULT - PROBLEM SELECTOR PLAN 2
Patient with downtrending hgb, likely in the setting of chronic disease. Patient with 2 bright red BM on 4/26.  - No blood products; patient is [employer].  - GI following, recs appreciated  - Heme consulted, recs appreciated  - F/u iron studies, B12, folate, retic count - indicative of anemia of chronic disease  - Daily folate supplementation, no epo due to malignancy  - D/c lovenox, anti-HTNs  - Stool count

## 2024-04-29 LAB
ALBUMIN SERPL ELPH-MCNC: 3.1 G/DL — LOW (ref 3.3–5)
ALP SERPL-CCNC: 333 U/L — HIGH (ref 40–120)
ALT FLD-CCNC: 38 U/L — HIGH (ref 4–33)
ANION GAP SERPL CALC-SCNC: 11 MMOL/L — SIGNIFICANT CHANGE UP (ref 7–14)
ANION GAP SERPL CALC-SCNC: 12 MMOL/L — SIGNIFICANT CHANGE UP (ref 7–14)
ANION GAP SERPL CALC-SCNC: 12 MMOL/L — SIGNIFICANT CHANGE UP (ref 7–14)
APTT BLD: 36.8 SEC — HIGH (ref 24.5–35.6)
AST SERPL-CCNC: 61 U/L — HIGH (ref 4–32)
BASOPHILS # BLD AUTO: 0.06 K/UL — SIGNIFICANT CHANGE UP (ref 0–0.2)
BASOPHILS NFR BLD AUTO: 1 % — SIGNIFICANT CHANGE UP (ref 0–2)
BILIRUB SERPL-MCNC: 19.1 MG/DL — HIGH (ref 0.2–1.2)
BUN SERPL-MCNC: 8 MG/DL — SIGNIFICANT CHANGE UP (ref 7–23)
CALCIUM SERPL-MCNC: 8.4 MG/DL — SIGNIFICANT CHANGE UP (ref 8.4–10.5)
CALCIUM SERPL-MCNC: 8.6 MG/DL — SIGNIFICANT CHANGE UP (ref 8.4–10.5)
CALCIUM SERPL-MCNC: 8.8 MG/DL — SIGNIFICANT CHANGE UP (ref 8.4–10.5)
CHLORIDE SERPL-SCNC: 100 MMOL/L — SIGNIFICANT CHANGE UP (ref 98–107)
CHLORIDE SERPL-SCNC: 96 MMOL/L — LOW (ref 98–107)
CHLORIDE SERPL-SCNC: 96 MMOL/L — LOW (ref 98–107)
CO2 SERPL-SCNC: 19 MMOL/L — LOW (ref 22–31)
CO2 SERPL-SCNC: 19 MMOL/L — LOW (ref 22–31)
CO2 SERPL-SCNC: 20 MMOL/L — LOW (ref 22–31)
CREAT SERPL-MCNC: 0.34 MG/DL — LOW (ref 0.5–1.3)
CREAT SERPL-MCNC: 0.37 MG/DL — LOW (ref 0.5–1.3)
CREAT SERPL-MCNC: 0.37 MG/DL — LOW (ref 0.5–1.3)
EGFR: 95 ML/MIN/1.73M2 — SIGNIFICANT CHANGE UP
EGFR: 95 ML/MIN/1.73M2 — SIGNIFICANT CHANGE UP
EGFR: 97 ML/MIN/1.73M2 — SIGNIFICANT CHANGE UP
EOSINOPHIL # BLD AUTO: 0.17 K/UL — SIGNIFICANT CHANGE UP (ref 0–0.5)
EOSINOPHIL NFR BLD AUTO: 2.8 % — SIGNIFICANT CHANGE UP (ref 0–6)
GLUCOSE SERPL-MCNC: 116 MG/DL — HIGH (ref 70–99)
GLUCOSE SERPL-MCNC: 126 MG/DL — HIGH (ref 70–99)
GLUCOSE SERPL-MCNC: 127 MG/DL — HIGH (ref 70–99)
HCT VFR BLD CALC: 28 % — LOW (ref 34.5–45)
HGB BLD-MCNC: 9 G/DL — LOW (ref 11.5–15.5)
IANC: 3.03 K/UL — SIGNIFICANT CHANGE UP (ref 1.8–7.4)
IMM GRANULOCYTES NFR BLD AUTO: 0.7 % — SIGNIFICANT CHANGE UP (ref 0–0.9)
INR BLD: 1.53 RATIO — HIGH (ref 0.85–1.18)
LYMPHOCYTES # BLD AUTO: 2.18 K/UL — SIGNIFICANT CHANGE UP (ref 1–3.3)
LYMPHOCYTES # BLD AUTO: 36.2 % — SIGNIFICANT CHANGE UP (ref 13–44)
MAGNESIUM SERPL-MCNC: 1.6 MG/DL — SIGNIFICANT CHANGE UP (ref 1.6–2.6)
MCHC RBC-ENTMCNC: 30.3 PG — SIGNIFICANT CHANGE UP (ref 27–34)
MCHC RBC-ENTMCNC: 32.1 GM/DL — SIGNIFICANT CHANGE UP (ref 32–36)
MCV RBC AUTO: 94.2 FL — SIGNIFICANT CHANGE UP (ref 80–100)
MONOCYTES # BLD AUTO: 0.55 K/UL — SIGNIFICANT CHANGE UP (ref 0–0.9)
MONOCYTES NFR BLD AUTO: 9.1 % — SIGNIFICANT CHANGE UP (ref 2–14)
NEUTROPHILS # BLD AUTO: 3.03 K/UL — SIGNIFICANT CHANGE UP (ref 1.8–7.4)
NEUTROPHILS NFR BLD AUTO: 50.2 % — SIGNIFICANT CHANGE UP (ref 43–77)
NRBC # BLD: 0 /100 WBCS — SIGNIFICANT CHANGE UP (ref 0–0)
NRBC # FLD: 0 K/UL — SIGNIFICANT CHANGE UP (ref 0–0)
PHOSPHATE SERPL-MCNC: 2.7 MG/DL — SIGNIFICANT CHANGE UP (ref 2.5–4.5)
PLATELET # BLD AUTO: 297 K/UL — SIGNIFICANT CHANGE UP (ref 150–400)
POTASSIUM SERPL-MCNC: 3.4 MMOL/L — LOW (ref 3.5–5.3)
POTASSIUM SERPL-MCNC: 3.5 MMOL/L — SIGNIFICANT CHANGE UP (ref 3.5–5.3)
POTASSIUM SERPL-MCNC: 3.5 MMOL/L — SIGNIFICANT CHANGE UP (ref 3.5–5.3)
POTASSIUM SERPL-SCNC: 3.4 MMOL/L — LOW (ref 3.5–5.3)
POTASSIUM SERPL-SCNC: 3.5 MMOL/L — SIGNIFICANT CHANGE UP (ref 3.5–5.3)
POTASSIUM SERPL-SCNC: 3.5 MMOL/L — SIGNIFICANT CHANGE UP (ref 3.5–5.3)
PROT SERPL-MCNC: 6.6 G/DL — SIGNIFICANT CHANGE UP (ref 6–8.3)
PROTHROM AB SERPL-ACNC: 16.9 SEC — HIGH (ref 9.5–13)
RBC # BLD: 2.97 M/UL — LOW (ref 3.8–5.2)
RBC # FLD: 18.1 % — HIGH (ref 10.3–14.5)
SODIUM SERPL-SCNC: 126 MMOL/L — LOW (ref 135–145)
SODIUM SERPL-SCNC: 127 MMOL/L — LOW (ref 135–145)
SODIUM SERPL-SCNC: 132 MMOL/L — LOW (ref 135–145)
WBC # BLD: 6.03 K/UL — SIGNIFICANT CHANGE UP (ref 3.8–10.5)
WBC # FLD AUTO: 6.03 K/UL — SIGNIFICANT CHANGE UP (ref 3.8–10.5)

## 2024-04-29 PROCEDURE — 93010 ELECTROCARDIOGRAM REPORT: CPT

## 2024-04-29 PROCEDURE — 99232 SBSQ HOSP IP/OBS MODERATE 35: CPT | Mod: GC

## 2024-04-29 PROCEDURE — 43235 EGD DIAGNOSTIC BRUSH WASH: CPT

## 2024-04-29 DEVICE — HYDRA WIRE: Type: IMPLANTABLE DEVICE | Status: FUNCTIONAL

## 2024-04-29 RX ORDER — CIPROFLOXACIN LACTATE 400MG/40ML
400 VIAL (ML) INTRAVENOUS ONCE
Refills: 0 | Status: DISCONTINUED | OUTPATIENT
Start: 2024-04-29 | End: 2024-04-30

## 2024-04-29 RX ORDER — SODIUM CHLORIDE 5 G/100ML
500 INJECTION, SOLUTION INTRAVENOUS
Refills: 0 | Status: DISCONTINUED | OUTPATIENT
Start: 2024-04-29 | End: 2024-04-29

## 2024-04-29 RX ORDER — POTASSIUM CHLORIDE 20 MEQ
40 PACKET (EA) ORAL EVERY 4 HOURS
Refills: 0 | Status: COMPLETED | OUTPATIENT
Start: 2024-04-29 | End: 2024-04-29

## 2024-04-29 RX ORDER — INDOMETHACIN 50 MG
100 CAPSULE ORAL ONCE
Refills: 0 | Status: DISCONTINUED | OUTPATIENT
Start: 2024-04-29 | End: 2024-04-30

## 2024-04-29 RX ADMIN — SODIUM CHLORIDE 30 MILLILITER(S): 5 INJECTION, SOLUTION INTRAVENOUS at 06:33

## 2024-04-29 RX ADMIN — Medication 1 MILLIGRAM(S): at 11:45

## 2024-04-29 RX ADMIN — SODIUM CHLORIDE 2 GRAM(S): 9 INJECTION INTRAMUSCULAR; INTRAVENOUS; SUBCUTANEOUS at 05:03

## 2024-04-29 RX ADMIN — Medication 100 MILLIGRAM(S): at 11:45

## 2024-04-29 RX ADMIN — Medication 40 MILLIEQUIVALENT(S): at 10:12

## 2024-04-29 RX ADMIN — PREGABALIN 1000 MICROGRAM(S): 225 CAPSULE ORAL at 11:45

## 2024-04-29 RX ADMIN — CHLORHEXIDINE GLUCONATE 1 APPLICATION(S): 213 SOLUTION TOPICAL at 05:05

## 2024-04-29 RX ADMIN — SODIUM CHLORIDE 30 MILLILITER(S): 5 INJECTION, SOLUTION INTRAVENOUS at 03:53

## 2024-04-29 RX ADMIN — CALAMINE AND ZINC OXIDE AND PHENOL 1 APPLICATION(S): 160; 10 LOTION TOPICAL at 11:45

## 2024-04-29 NOTE — PROGRESS NOTE ADULT - SUBJECTIVE AND OBJECTIVE BOX
Patient is a 89y old  Female who presents with a chief complaint of Pancreatic mass, abnormal lab-work    SUBJECTIVE / OVERNIGHT EVENTS: Patient seen and examined at bedside. No overnight events.    MEDICATIONS  (STANDING):  calamine/zinc oxide Lotion 1 Application(s) Topical daily  chlorhexidine 2% Cloths 1 Application(s) Topical <User Schedule>  cholestyramine Powder (Sugar-Free) 4 Gram(s) Oral two times a day  cyanocobalamin 1000 MICROGram(s) Oral daily  folic acid 1 milliGRAM(s) Oral daily  potassium chloride    Tablet ER 40 milliEquivalent(s) Oral every 4 hours  sodium chloride 2 Gram(s) Oral three times a day  sodium chloride 3%. 500 milliLiter(s) (30 mL/Hr) IV Continuous <Continuous>  thiamine 100 milliGRAM(s) Oral daily    MEDICATIONS  (PRN):  melatonin 3 milliGRAM(s) Oral at bedtime PRN Insomnia    Vital Signs Last 24 Hrs  T(C): 36.6 (29 Apr 2024 04:48), Max: 37 (28 Apr 2024 21:42)  T(F): 97.8 (29 Apr 2024 04:48), Max: 98.6 (28 Apr 2024 21:42)  HR: 66 (29 Apr 2024 04:48) (66 - 77)  BP: 125/61 (29 Apr 2024 04:48) (125/61 - 127/89)  BP(mean): --  RR: 17 (29 Apr 2024 04:48) (17 - 18)  SpO2: 98% (29 Apr 2024 04:48) (98% - 100%)    Parameters below as of 29 Apr 2024 04:48  Patient On (Oxygen Delivery Method): room air    CAPILLARY BLOOD GLUCOSE    I&O's Summary    28 Apr 2024 07:01  -  29 Apr 2024 07:00  --------------------------------------------------------  IN: 1260 mL / OUT: 1400 mL / NET: -140 mL    PHYSICAL EXAM:  GENERAL: NAD, well-developed  HEAD:  Atraumatic, Normocephalic  EYES: +Scleral icterus. EOMI, PERRLA,  NECK: Supple, No JVD  CHEST/LUNG: Clear to auscultation bilaterally; No wheeze  HEART: Regular rate and rhythm; No murmurs, rubs, or gallops  ABDOMEN: Soft, Nontender, Nondistended; Bowel sounds present  EXTREMITIES:  2+ Peripheral Pulses, No clubbing, cyanosis, or edema  PSYCH: AAOx3  NEUROLOGY: non-focal  SKIN: No rashes or lesions    LABS:                        9.0    6.03  )-----------( 297      ( 29 Apr 2024 04:43 )             28.0     04-29    127<L>  |  96<L>  |  8   ----------------------------<  116<H>  3.4<L>   |  19<L>  |  0.37<L>    Ca    8.6      29 Apr 2024 04:43  Phos  2.7     04-29  Mg     1.60     04-29    TPro  6.6  /  Alb  3.1<L>  /  TBili  19.1<H>  /  DBili  x   /  AST  61<H>  /  ALT  38<H>  /  AlkPhos  333<H>  04-29    PT/INR - ( 29 Apr 2024 04:43 )   PT: 16.9 sec;   INR: 1.53 ratio      PTT - ( 29 Apr 2024 04:43 )  PTT:36.8 sec    Urinalysis Basic - ( 29 Apr 2024 04:43 )    Color: x / Appearance: x / SG: x / pH: x  Gluc: 116 mg/dL / Ketone: x  / Bili: x / Urobili: x   Blood: x / Protein: x / Nitrite: x   Leuk Esterase: x / RBC: x / WBC x   Sq Epi: x / Non Sq Epi: x / Bacteria: x    RADIOLOGY & ADDITIONAL TESTS:    Imaging Personally Reviewed:    Consultant(s) Notes Reviewed:      Care Discussed with Consultants/Other Providers:    Josep Patel MD, Internal Medicine Resident Patient is a 89y old  Female who presents with a chief complaint of Pancreatic mass, abnormal lab-work    SUBJECTIVE / OVERNIGHT EVENTS: Patient seen and examined at bedside. No overnight events. Patient in good spirits this AM, says she feels well and is without acute concerns. Is hopeful that she can undergo procedure with GI today. This AM denies CP, SOB, subjective fever, chills, n/v/d.    MEDICATIONS  (STANDING):  calamine/zinc oxide Lotion 1 Application(s) Topical daily  chlorhexidine 2% Cloths 1 Application(s) Topical <User Schedule>  cholestyramine Powder (Sugar-Free) 4 Gram(s) Oral two times a day  cyanocobalamin 1000 MICROGram(s) Oral daily  folic acid 1 milliGRAM(s) Oral daily  potassium chloride    Tablet ER 40 milliEquivalent(s) Oral every 4 hours  sodium chloride 2 Gram(s) Oral three times a day  sodium chloride 3%. 500 milliLiter(s) (30 mL/Hr) IV Continuous <Continuous>  thiamine 100 milliGRAM(s) Oral daily    MEDICATIONS  (PRN):  melatonin 3 milliGRAM(s) Oral at bedtime PRN Insomnia    Vital Signs Last 24 Hrs  T(C): 36.6 (29 Apr 2024 04:48), Max: 37 (28 Apr 2024 21:42)  T(F): 97.8 (29 Apr 2024 04:48), Max: 98.6 (28 Apr 2024 21:42)  HR: 66 (29 Apr 2024 04:48) (66 - 77)  BP: 125/61 (29 Apr 2024 04:48) (125/61 - 127/89)  BP(mean): --  RR: 17 (29 Apr 2024 04:48) (17 - 18)  SpO2: 98% (29 Apr 2024 04:48) (98% - 100%)    Parameters below as of 29 Apr 2024 04:48  Patient On (Oxygen Delivery Method): room air    CAPILLARY BLOOD GLUCOSE    I&O's Summary    28 Apr 2024 07:01  -  29 Apr 2024 07:00  --------------------------------------------------------  IN: 1260 mL / OUT: 1400 mL / NET: -140 mL    PHYSICAL EXAM:  GENERAL: NAD, well-developed  HEAD:  Atraumatic, Normocephalic  EYES: +Scleral icterus. EOMI, PERRLA,  NECK: Supple, No JVD  CHEST/LUNG: Clear to auscultation bilaterally; No wheeze  HEART: Regular rate and rhythm; No murmurs, rubs, or gallops  ABDOMEN: Soft, Nontender, Nondistended; Bowel sounds present  EXTREMITIES:  2+ Peripheral Pulses, No clubbing, cyanosis, or edema  PSYCH: AAOx3  NEUROLOGY: non-focal  SKIN: No rashes or lesions    LABS:                        9.0    6.03  )-----------( 297      ( 29 Apr 2024 04:43 )             28.0     04-29    127<L>  |  96<L>  |  8   ----------------------------<  116<H>  3.4<L>   |  19<L>  |  0.37<L>    Ca    8.6      29 Apr 2024 04:43  Phos  2.7     04-29  Mg     1.60     04-29    TPro  6.6  /  Alb  3.1<L>  /  TBili  19.1<H>  /  DBili  x   /  AST  61<H>  /  ALT  38<H>  /  AlkPhos  333<H>  04-29    PT/INR - ( 29 Apr 2024 04:43 )   PT: 16.9 sec;   INR: 1.53 ratio      PTT - ( 29 Apr 2024 04:43 )  PTT:36.8 sec    Urinalysis Basic - ( 29 Apr 2024 04:43 )    Color: x / Appearance: x / SG: x / pH: x  Gluc: 116 mg/dL / Ketone: x  / Bili: x / Urobili: x   Blood: x / Protein: x / Nitrite: x   Leuk Esterase: x / RBC: x / WBC x   Sq Epi: x / Non Sq Epi: x / Bacteria: x    RADIOLOGY & ADDITIONAL TESTS:    Imaging Personally Reviewed:    Consultant(s) Notes Reviewed:      Care Discussed with Consultants/Other Providers:    Josep Patel MD, Internal Medicine Resident

## 2024-04-29 NOTE — PROGRESS NOTE ADULT - PROBLEM SELECTOR PLAN 2
Patient with downtrending hgb, likely in the setting of chronic disease. Patient with 2 bright red BM on 4/26.  - No blood products; patient is [employer].  - GI following, recs appreciated  - Heme consulted, recs appreciated  - F/u iron studies, B12, folate, retic count - indicative of anemia of chronic disease  - Daily folate supplementation, no epo due to malignancy  - D/c lovenox, anti-HTNs  - Stool count Patient with downtrending hgb, likely in the setting of chronic disease. Patient with last bright red BM on 4/27.  - No blood products; patient is [place].  - GI following, recs appreciated  - Heme consulted, recs appreciated  - F/u iron studies, B12, folate, retic count - indicative of anemia of chronic disease  - Daily folate supplementation, no epo due to malignancy  - D/c lovenox, anti-HTNs  - Stool count

## 2024-04-29 NOTE — PROGRESS NOTE ADULT - PROBLEM SELECTOR PLAN 3
Presents with pruritis, jaundice, and elevated Bilirubin, Alk Phos, and AST, likely in setting of obstruction/compression by pancreatic mass  - CT imaging findings as above  - R factor 0.3, cholestatic  - No leukocytosis, no abdominal pain nor tenderness, no fevers/chills lately, no diarrhea nor signs/symptoms of possible colitis mentioned on CT  - sequelae of chronic cholecystitis likely iso above CT findings  - hepatitis panel negative   - f/u GI recs regarding possible stent - planned for 4/26 - cancelled 2/2 to hyponatremia Presents with pruritis, jaundice, and elevated Bilirubin, Alk Phos, and AST, likely in setting of obstruction/compression by pancreatic mass  - CT imaging findings as above  - R factor 0.3, cholestatic  - No leukocytosis, no abdominal pain nor tenderness, no fevers/chills lately, no diarrhea nor signs/symptoms of possible colitis mentioned on CT  - sequelae of chronic cholecystitis likely iso above CT findings  - hepatitis panel negative   - f/u GI recs regarding possible stent - cancelled 2/2 to hyponatremia - rescheduled for 4/29

## 2024-04-29 NOTE — CONSULT NOTE ADULT - REASON FOR ADMISSION
Pancreatic mass, abnormal lab-work

## 2024-04-29 NOTE — ASU PREOP CHECKLIST - AS BP NONINV SITE
Addendum Note by Sarah Silvestre MD at 3/5/2018  3:04 PM     Author: Sarah Silvestre MD Service: -- Author Type: Physician    Filed: 3/5/2018  3:04 PM Encounter Date: 2/21/2018 Status: Signed    : Sarah Silvestre MD (Physician)    Addended by: SARAH SILVESTRE on: 3/5/2018 03:04 PM        Modules accepted: Orders        
right upper arm

## 2024-04-29 NOTE — CONSULT NOTE ADULT - CONSULT REASON
pancreatic mass
goals of care
pancreatic mass
Pre-op risk stratification
Percutaneous biliary drainage

## 2024-04-29 NOTE — PROGRESS NOTE ADULT - SUBJECTIVE AND OBJECTIVE BOX
Cardiovascular Disease Progress Note  DATE OF SERVICE: 04-29-24 @ 10:17    Overnight events: No acute events overnight.    The patient denies pain.   Otherwise review of systems negative    Objective Findings:  T(C): 36.6 (04-29-24 @ 04:48), Max: 37 (04-28-24 @ 21:42)  HR: 66 (04-29-24 @ 04:48) (66 - 77)  BP: 125/61 (04-29-24 @ 04:48) (125/61 - 127/89)  RR: 17 (04-29-24 @ 04:48) (17 - 18)  SpO2: 98% (04-29-24 @ 04:48) (98% - 100%)  Wt(kg): --  Daily     Daily       Physical Exam:  Gen: NAD; Patient resting comfortably  HEENT: EOMI, Normocephalic/ atraumatic  CV: RRR, normal S1 + S2, systolic murmur  Lungs:  Normal respiratory effort; clear to auscultation bilaterally  Abd: soft, non-tender; bowel sounds present  Ext: No edema; warm and well perfused    Telemetry: n/a    Laboratory Data:                        9.0    6.03  )-----------( 297      ( 29 Apr 2024 04:43 )             28.0     04-29    127<L>  |  96<L>  |  8   ----------------------------<  116<H>  3.4<L>   |  19<L>  |  0.37<L>    Ca    8.6      29 Apr 2024 04:43  Phos  2.7     04-29  Mg     1.60     04-29    TPro  6.6  /  Alb  3.1<L>  /  TBili  19.1<H>  /  DBili  x   /  AST  61<H>  /  ALT  38<H>  /  AlkPhos  333<H>  04-29    PT/INR - ( 29 Apr 2024 04:43 )   PT: 16.9 sec;   INR: 1.53 ratio         PTT - ( 29 Apr 2024 04:43 )  PTT:36.8 sec          Inpatient Medications:  MEDICATIONS  (STANDING):  calamine/zinc oxide Lotion 1 Application(s) Topical daily  chlorhexidine 2% Cloths 1 Application(s) Topical <User Schedule>  cholestyramine Powder (Sugar-Free) 4 Gram(s) Oral two times a day  cyanocobalamin 1000 MICROGram(s) Oral daily  folic acid 1 milliGRAM(s) Oral daily  potassium chloride    Tablet ER 40 milliEquivalent(s) Oral every 4 hours  sodium chloride 2 Gram(s) Oral three times a day  sodium chloride 3%. 500 milliLiter(s) (30 mL/Hr) IV Continuous <Continuous>  thiamine 100 milliGRAM(s) Oral daily      Assessment:  89 year old woman with HTN, HLD, aortic stenosis, and peripheral vascular disease presents with painless jaundice     Plan of Care:    #Cardiac risk stratification  - Of note patient has moderate to severe aortic stenosis   MsFco Small is not fluid overloaded on exam and displays no signs of coronary ischemia.   - She is at elevated, but not prohibitive, risk for endoscopic intervention.   - Please use vasoplegic anesthesia induction judiciously, as the patient is preload dependent in the setting of her valvular disease.   The patient is optimized from a cardiac standpoint to proceed.  Management of electrolyte abnormalities as per the primary team.     #HTN  - BP acceptable on current regimen    #HLD  - Statin on hold in setting of transaminitis    #Aortic stenosis-  Conservative management as per patient wishes.      #ACP (advance care planning)-  Advanced care planning was discussed with the patient.  Advance care planning forms were discussed. Risks, benefits and alternatives of medical treatment and procedures were discussed in detail and all questions were answered.   30 additional minutes spent addressing advance care plans.      Over 55 minutes spent on total encounter; more than 50% of the visit was spent counseling and/or coordinating care by the attending physician.      Jamie Bella MD [facility]  Cardiovascular Disease  [phone]

## 2024-04-29 NOTE — CONSULT NOTE ADULT - SUBJECTIVE AND OBJECTIVE BOX
Interventional Radiology      HPI: 89y Female with HTN, HLD, aortic stenosis, and peripheral vascular disease presents to the ED for elevated LFTs over the past 3 weeks with symptoms of painless jaundice and itchiness, found to have elevated Bilirubin and Alk Phos with CT findings of CBD dilatation iso pancreatic mass, admitted for further work-up and management. GI attempted endoscopic biliary stent placement on 4/29, but was unsuccessful due to a severely stenosed duodenal bulb. IR was consulted for percutaneous biliary drainage.     Allergies: No Known Drug Allergies    Medications (Abx/Cardiac/Anticoagulation/Blood Products)      Data:  172.7  60.3  T(C): 36.2  HR: 90  BP: 149/69  RR: 12  SpO2: 100%    -WBC 6.03 / HgB 9.0 / Hct 28.0 / Plt 297  -Na 132 / Cl 100 / BUN 8 / Glucose 126  -K 3.5 / CO2 20 / Cr 0.34  -ALT -- / Alk Phos -- / T.Bili --  -INR 1.53 / PTT 36.8      Radiology:   Imaging reviewed.    Assessment/Plan:   89y Female with HTN, HLD, aortic stenosis, and peripheral vascular disease presents painless jaundice and pruritis. CT Abdomen/Pelvis demonstrated a pancreatic mass with mild intrahepatic and extrahepatic biliary dilatation. GI attempted endoscopic biliary stent placement on 4/29, but was unsuccessful due to a severely stenosed duodenal bulb. IR was consulted for percutaneous biliary drainage.     -- case discussed with Dr. Lozano, placement of percutaneous biliary drain can be offered.   -- IR will tentatively plan to perform percutaneous biliary drainage on 4/30 pending discussion of the risks and benefits of the procedure with the patient. The biliary drain will likely be long-standing and require periodic exchanges. Please ensure that this procedure is in line with patient's goals of care and code status.   -- if patient is agreeable to proceed with the procedure and the above conditions have been fulfilled:      -- NPO at midnight on 4/30      -- hold lovenox 12 hours prior to procedure      -- please complete IR pre-procedure note      -- please place IR procedure request order under Dr. Lozano    --  Tmi Mirza MD PGY-3  Available on [employer] Teams    - Non-emergent consults: Place IR consult order in [place]  - Emergent issues (pager): [facility] [phone]; [facility] [phone]; [phone]  - Scheduling questions: [facility] [phone]; [facility] [phone]  - Clinic/outpatient booking: [facility] [phone]; [facility] [phone] Interventional Radiology      HPI: 89y Female with HTN, HLD, aortic stenosis, and peripheral vascular disease presents to the ED for elevated LFTs over the past 3 weeks with symptoms of painless jaundice and itchiness, found to have elevated Bilirubin and Alk Phos with CT findings of CBD dilatation iso pancreatic mass, admitted for further work-up and management. GI attempted endoscopic biliary stent placement on 4/29, but was unsuccessful due to a severely stenosed duodenal bulb. IR was consulted for percutaneous biliary drainage.     Allergies: No Known Drug Allergies    Medications (Abx/Cardiac/Anticoagulation/Blood Products)      Data:  172.7  60.3  T(C): 36.2  HR: 90  BP: 149/69  RR: 12  SpO2: 100%    -WBC 6.03 / HgB 9.0 / Hct 28.0 / Plt 297  -Na 132 / Cl 100 / BUN 8 / Glucose 126  -K 3.5 / CO2 20 / Cr 0.34  -ALT -- / Alk Phos -- / T.Bili --  -INR 1.53 / PTT 36.8      Radiology:   Imaging reviewed.    Assessment/Plan:   89y Female with HTN, HLD, aortic stenosis, and peripheral vascular disease presents painless jaundice and pruritis. CT Abdomen/Pelvis demonstrated a pancreatic mass with mild intrahepatic and extrahepatic biliary dilatation. GI attempted endoscopic biliary stent placement on 4/29, but was unsuccessful due to a severely stenosed duodenal bulb. IR was consulted for percutaneous biliary drainage.     -- case discussed with Dr. Lozano, placement of percutaneous biliary drain can be offered.   -- IR will tentatively plan to perform percutaneous biliary drainage on 4/30 pending discussion of the risks and benefits of the procedure with the patient. The biliary drain will likely be long-standing and require periodic exchanges. Please ensure that this procedure is in line with patient's goals of care and code status. Metal biliary stent placement likely to fail given extent of disease, particularly with the extent of involvement of the duodenum. Additionally, a second drain may be needed if disease were to progress and there be further isolation of biliary ducts.   -- if patient is agreeable to proceed with the procedure and the above conditions have been fulfilled:      -- NPO at midnight on 4/30      -- hold lovenox 12 hours prior to procedure      -- please complete IR pre-procedure note      -- please place IR procedure request order under Dr. Lozano    --  Tim Mirza MD PGY-3  Available on [employer] Teams    - Non-emergent consults: Place IR consult order in [place]  - Emergent issues (pager): [facility] [phone]; [facility] [phone]; [phone]  - Scheduling questions: [facility] [phone]; [facility] [phone]  - Clinic/outpatient booking: [facility] [phone]; [facility] [phone]

## 2024-04-29 NOTE — PROGRESS NOTE ADULT - PROBLEM SELECTOR PLAN 5
Patient hyponatremic to 129 on admission, as low as 123 on 4/26. Likely in the setting of malignancy, decreased PO intake, vs SIADH.  - Serum osm: 266, urine osm: 637, urine Na: 147.  - TSH wnl, f/u AM cortisol  - Salt tabs 2g TID  - Optimize glucose control  - Fluid restriction to 1L, d/c DASH diet  - Caution not to overcorrect >6-8 within 24h period

## 2024-04-29 NOTE — CHART NOTE - NSCHARTNOTEFT_GEN_A_CORE
Brief Update Note    ERCP aborted given severely stenosed duodenal bulb/sweep, unable to traverse.  Consult IR for intervention for palliative biliary stent; no bx in accordance with patient and family's wishes.  Unable to offer alternative from advanced GI standpoint.    Regarding rectal bleeding, patient denying and as per primary, no recurrent episode.   Patient would like to defer endoscopic evaluation of rectal bleeding at this time, but states she will revisit the possibility should her sx resume  or progress. If ongoing bleeding and patient amenable to endoscopic evaluation, please reach out.      Thank you for involving us in this patient's care. Please reach out if any further questions or concerns. Signing off.    Sahra Veloz MD  Gastroenterology/Hepatology Fellow, PGY-7    NON-URGENT CONSULTS:  Please email [email or web address]  AT NIGHT AND ON WEEKENDS:  Contact on-call GI fellow via answering service ([phone]) from 5pm-8am and on weekends/holidays  MONDAY-FRIDAY 8AM-5PM:  Pager: [phone] ([facility])  Pager: [phone] ([facility]) Brief Update Note    ERCP aborted given severely stenosed duodenal bulb/sweep, unable to traverse.  Consult IR for intervention for palliative biliary drainage; no bx in accordance with patient and family's wishes.  Unable to offer alternative from advanced GI standpoint.    Regarding rectal bleeding, patient denying and as per primary, no recurrent episode.   Patient would like to defer endoscopic evaluation of rectal bleeding at this time, but states she will revisit the possibility should her sx resume  or progress. If ongoing bleeding and patient amenable to endoscopic evaluation, please reach out.      Thank you for involving us in this patient's care. Please reach out if any further questions or concerns. Signing off.    Sahra Veloz MD  Gastroenterology/Hepatology Fellow, PGY-7    NON-URGENT CONSULTS:  Please email [email or web address]  [email or web address]  AT NIGHT AND ON WEEKENDS:  Contact on-call GI fellow via answering service ([phone]) from 5pm-8am and on weekends/holidays  MONDAY-FRIDAY 8AM-5PM:  Pager: [phone] ([facility])  Pager: [phone] ([facility])

## 2024-04-29 NOTE — PROGRESS NOTE ADULT - PROBLEM SELECTOR PLAN 4
Likely iso cholestasis given above. No rashes or lesions visualized.   - Can cont with antihistamine to assess if improvement  - Trial Cholestyramine BID  - Limit benadryl due to sedating effects  - Calamine lotion Likely iso cholestasis given above. No rashes or lesions visualized.  - Can cont with antihistamine to assess if improvement  - Trial Cholestyramine BID  - Limit benadryl due to sedating effects  - Calamine lotion

## 2024-04-29 NOTE — PROGRESS NOTE ADULT - ASSESSMENT
89F with PMH of HTN, HLD, [place], and peripheral vascular disease presenting to the ED for elevated LFTs over the past 3 weeks with symptoms of painless jaundice and itchiness, found to have elevated Bilirubin and Alk Phos with CT findings of CBD dilatation iso pancreatic mass, admitted for further work-up and management.

## 2024-04-30 PROCEDURE — 99497 ADVNCD CARE PLAN 30 MIN: CPT | Mod: 25

## 2024-04-30 PROCEDURE — 99232 SBSQ HOSP IP/OBS MODERATE 35: CPT

## 2024-04-30 PROCEDURE — 99232 SBSQ HOSP IP/OBS MODERATE 35: CPT | Mod: GC

## 2024-04-30 RX ORDER — VALSARTAN 80 MG/1
1 TABLET ORAL
Refills: 0 | DISCHARGE

## 2024-04-30 RX ORDER — THIAMINE MONONITRATE (VIT B1) 100 MG
1 TABLET ORAL
Qty: 90 | Refills: 0
Start: 2024-04-30 | End: 2024-07-28

## 2024-04-30 RX ORDER — CALAMINE AND ZINC OXIDE AND PHENOL 160; 10 MG/ML; MG/ML
1 LOTION TOPICAL
Qty: 3 | Refills: 0
Start: 2024-04-30 | End: 2024-05-29

## 2024-04-30 RX ORDER — PREGABALIN 225 MG/1
1 CAPSULE ORAL
Qty: 90 | Refills: 0
Start: 2024-04-30 | End: 2024-07-28

## 2024-04-30 RX ORDER — MIRTAZAPINE 45 MG/1
7.5 TABLET, ORALLY DISINTEGRATING ORAL AT BEDTIME
Refills: 0 | Status: DISCONTINUED | OUTPATIENT
Start: 2024-04-30 | End: 2024-05-02

## 2024-04-30 RX ORDER — LANOLIN ALCOHOL/MO/W.PET/CERES
1 CREAM (GRAM) TOPICAL
Qty: 90 | Refills: 0
Start: 2024-04-30 | End: 2024-07-28

## 2024-04-30 RX ORDER — SODIUM CHLORIDE 9 MG/ML
2 INJECTION INTRAMUSCULAR; INTRAVENOUS; SUBCUTANEOUS
Qty: 180 | Refills: 0
Start: 2024-04-30 | End: 2024-05-29

## 2024-04-30 RX ORDER — INDAPAMIDE 1.25 MG
1 TABLET ORAL
Refills: 0 | DISCHARGE

## 2024-04-30 RX ORDER — CHOLESTYRAMINE 4 G/9G
4 POWDER, FOR SUSPENSION ORAL
Qty: 720 | Refills: 0
Start: 2024-04-30 | End: 2024-07-28

## 2024-04-30 RX ORDER — FOLIC ACID 0.8 MG
1 TABLET ORAL
Qty: 90 | Refills: 0
Start: 2024-04-30 | End: 2024-07-28

## 2024-04-30 RX ADMIN — Medication 1 MILLIGRAM(S): at 11:14

## 2024-04-30 RX ADMIN — PREGABALIN 1000 MICROGRAM(S): 225 CAPSULE ORAL at 11:15

## 2024-04-30 RX ADMIN — Medication 100 MILLIGRAM(S): at 11:14

## 2024-04-30 RX ADMIN — CALAMINE AND ZINC OXIDE AND PHENOL 1 APPLICATION(S): 160; 10 LOTION TOPICAL at 11:13

## 2024-04-30 RX ADMIN — SODIUM CHLORIDE 2 GRAM(S): 9 INJECTION INTRAMUSCULAR; INTRAVENOUS; SUBCUTANEOUS at 13:47

## 2024-04-30 RX ADMIN — MIRTAZAPINE 7.5 MILLIGRAM(S): 45 TABLET, ORALLY DISINTEGRATING ORAL at 21:28

## 2024-04-30 RX ADMIN — SODIUM CHLORIDE 2 GRAM(S): 9 INJECTION INTRAMUSCULAR; INTRAVENOUS; SUBCUTANEOUS at 05:21

## 2024-04-30 RX ADMIN — CHLORHEXIDINE GLUCONATE 1 APPLICATION(S): 213 SOLUTION TOPICAL at 05:22

## 2024-04-30 RX ADMIN — SODIUM CHLORIDE 2 GRAM(S): 9 INJECTION INTRAMUSCULAR; INTRAVENOUS; SUBCUTANEOUS at 21:28

## 2024-04-30 NOTE — PROGRESS NOTE ADULT - PROBLEM SELECTOR PLAN 10
DVT ppx: SCDs  Diet: CLD, no coffee/fish  PT c/s    GOC: DNR/intubate per discussion overnight (unclear if time of night affected discussion). Was DNR/DNI per family members. At this time family does not wish to change MOLST until speaking more w/ patient DVT ppx: SCDs  Diet: Regular, no coffee/fish  Ethics: DNR/intubate per discussion overnight (unclear if time of night affected discussion). Was previously DNR/DNI per family members.

## 2024-04-30 NOTE — PROGRESS NOTE ADULT - ASSESSMENT
89 year-old female with history of HTN, HLD, [place], and peripheral vascular disease presenting to the ED for elevated LFTs over the past 3 weeks with symptoms of painless jaundice and itchiness. Patient recently returned this morning from [facility] after [place] a [place] outside the country.  Labs notable for elevated lipase, elevated Tbili, elevated Alk Phos and AST, and mild electrolyte abnormalities. CT abd/pelvis with main findings of biliary dilatation as well as pancreatic mass. Palliative consulted for complex medical decision making and symptom management in the setting of serious illness. Pt with findings concerning for pancreatic cancer.

## 2024-04-30 NOTE — PROGRESS NOTE ADULT - SUBJECTIVE AND OBJECTIVE BOX
ANESTHESIA POSTOP CHECK    89y Female POSTOP DAY 1     Vital Signs Last 24 Hrs  T(C): 36.3 (30 Apr 2024 05:11), Max: 36.7 (29 Apr 2024 16:40)  T(F): 97.4 (30 Apr 2024 05:11), Max: 98.1 (29 Apr 2024 16:40)  HR: 64 (30 Apr 2024 05:11) (64 - 92)  BP: 127/62 (30 Apr 2024 05:11) (127/62 - 158/75)  RR: 19 (30 Apr 2024 05:11) (12 - 20)  SpO2: 100% (30 Apr 2024 05:11) (99% - 100%)    Parameters below as of 30 Apr 2024 05:11  Patient On (Oxygen Delivery Method): room air          [x ] NO APPARENT ANESTHESIA COMPLICATIONS

## 2024-04-30 NOTE — PROGRESS NOTE ADULT - PROBLEM SELECTOR PLAN 2
Patient with downtrending hgb, likely in the setting of chronic disease. Patient with last bright red BM on 4/27.  - No blood products; patient is Sh[employer].  - GI following, recs appreciated  - Heme consulted, recs appreciated  - F/u iron studies, B12, folate, retic count - indicative of anemia of chronic disease  - Daily folate supplementation, no epo due to malignancy  - D/c lovenox, anti-HTNs  - Stool count

## 2024-04-30 NOTE — PROGRESS NOTE ADULT - ASSESSMENT
89F with PMH of HTN, HLD, [place], and peripheral vascular disease presenting to the ED for elevated LFTs over the past 3 weeks with symptoms of painless jaundice and itchiness, found to have elevated Bilirubin and Alk Phos with CT findings of CBD dilatation iso pancreatic mass, admitted for further work-up and management. 89F with PMH of HTN, HLD, [place], and peripheral vascular disease presenting to the ED for elevated LFTs over the past 3 weeks with symptoms of painless jaundice and itchiness, found to have elevated Bilirubin and Alk Phos with CT findings of CBD dilatation iso pancreatic mass. S/o unsuccessful biliary stent placement, pending possible percutaneous biliary drain.

## 2024-04-30 NOTE — PROGRESS NOTE ADULT - SUBJECTIVE AND OBJECTIVE BOX
Patient is a 89y old  Female who presents with a chief complaint of Pancreatic mass, abnormal lab-work    SUBJECTIVE / OVERNIGHT EVENTS: Patient seen and examined at bedside. Patient returned from ERCP yesterday evening; patient tolerated procedure well but stent placement unsuccessful.    MEDICATIONS  (STANDING):  calamine/zinc oxide Lotion 1 Application(s) Topical daily  chlorhexidine 2% Cloths 1 Application(s) Topical <User Schedule>  cholestyramine Powder (Sugar-Free) 4 Gram(s) Oral two times a day  ciprofloxacin   IVPB 400 milliGRAM(s) IV Intermittent once  cyanocobalamin 1000 MICROGram(s) Oral daily  folic acid 1 milliGRAM(s) Oral daily  indomethacin Suppository 100 milliGRAM(s) Rectal once  sodium chloride 2 Gram(s) Oral three times a day  thiamine 100 milliGRAM(s) Oral daily    MEDICATIONS  (PRN):  melatonin 3 milliGRAM(s) Oral at bedtime PRN Insomnia    Vital Signs Last 24 Hrs  T(C): 36.3 (30 Apr 2024 05:11), Max: 36.7 (29 Apr 2024 16:40)  T(F): 97.4 (30 Apr 2024 05:11), Max: 98.1 (29 Apr 2024 16:40)  HR: 64 (30 Apr 2024 05:11) (64 - 92)  BP: 127/62 (30 Apr 2024 05:11) (127/62 - 158/75)  BP(mean): --  RR: 19 (30 Apr 2024 05:11) (12 - 20)  SpO2: 100% (30 Apr 2024 05:11) (99% - 100%)    Parameters below as of 30 Apr 2024 05:11  Patient On (Oxygen Delivery Method): room air    CAPILLARY BLOOD GLUCOSE    I&O's Summary    29 Apr 2024 07:01  -  30 Apr 2024 07:00  --------------------------------------------------------  IN: 240 mL / OUT: 2000 mL / NET: -1760 mL    PHYSICAL EXAM:  GENERAL: NAD, well-developed  HEAD:  Atraumatic, Normocephalic  EYES: +Scleral icterus. EOMI, PERRLA,  NECK: Supple, No JVD  CHEST/LUNG: Clear to auscultation bilaterally; No wheeze  HEART: Regular rate and rhythm; No murmurs, rubs, or gallops  ABDOMEN: Soft, Nontender, Nondistended; Bowel sounds present  EXTREMITIES:  2+ Peripheral Pulses, No clubbing, cyanosis, or edema  PSYCH: AAOx3  NEUROLOGY: non-focal  SKIN: No rashes or lesions    LABS:                        9.0    6.03  )-----------( 297      ( 29 Apr 2024 04:43 )             28.0     04-29    132<L>  |  100  |  8   ----------------------------<  126<H>  3.5   |  20<L>  |  0.34<L>    Ca    8.8      29 Apr 2024 10:52  Phos  2.7     04-29  Mg     1.60     04-29    TPro  6.6  /  Alb  3.1<L>  /  TBili  19.1<H>  /  DBili  x   /  AST  61<H>  /  ALT  38<H>  /  AlkPhos  333<H>  04-29    PT/INR - ( 29 Apr 2024 04:43 )   PT: 16.9 sec;   INR: 1.53 ratio       PTT - ( 29 Apr 2024 04:43 )  PTT:36.8 sec    Urinalysis Basic - ( 29 Apr 2024 10:52 )    Color: x / Appearance: x / SG: x / pH: x  Gluc: 126 mg/dL / Ketone: x  / Bili: x / Urobili: x   Blood: x / Protein: x / Nitrite: x   Leuk Esterase: x / RBC: x / WBC x   Sq Epi: x / Non Sq Epi: x / Bacteria: x    RADIOLOGY & ADDITIONAL TESTS:    Imaging Personally Reviewed:    Consultant(s) Notes Reviewed:      Care Discussed with Consultants/Other Providers:    Josep Patel MD, Internal Medicine Resident Patient is a 89y old  Female who presents with a chief complaint of Pancreatic mass, abnormal lab-work    SUBJECTIVE / OVERNIGHT EVENTS: Patient seen and examined at bedside. Patient returned from ERCP yesterday evening; patient tolerated procedure well but stent placement unsuccessful. Patient upset but notes partial relief in her itchiness this AM. Is unsure at this time if she would want to undergo another procedure but will discuss with family further. Denies CP, SOB, subjective fever, chills, n/v/d.    MEDICATIONS  (STANDING):  calamine/zinc oxide Lotion 1 Application(s) Topical daily  chlorhexidine 2% Cloths 1 Application(s) Topical <User Schedule>  cholestyramine Powder (Sugar-Free) 4 Gram(s) Oral two times a day  ciprofloxacin   IVPB 400 milliGRAM(s) IV Intermittent once  cyanocobalamin 1000 MICROGram(s) Oral daily  folic acid 1 milliGRAM(s) Oral daily  indomethacin Suppository 100 milliGRAM(s) Rectal once  sodium chloride 2 Gram(s) Oral three times a day  thiamine 100 milliGRAM(s) Oral daily    MEDICATIONS  (PRN):  melatonin 3 milliGRAM(s) Oral at bedtime PRN Insomnia    Vital Signs Last 24 Hrs  T(C): 36.3 (30 Apr 2024 05:11), Max: 36.7 (29 Apr 2024 16:40)  T(F): 97.4 (30 Apr 2024 05:11), Max: 98.1 (29 Apr 2024 16:40)  HR: 64 (30 Apr 2024 05:11) (64 - 92)  BP: 127/62 (30 Apr 2024 05:11) (127/62 - 158/75)  BP(mean): --  RR: 19 (30 Apr 2024 05:11) (12 - 20)  SpO2: 100% (30 Apr 2024 05:11) (99% - 100%)    Parameters below as of 30 Apr 2024 05:11  Patient On (Oxygen Delivery Method): room air    CAPILLARY BLOOD GLUCOSE    I&O's Summary    29 Apr 2024 07:01  -  30 Apr 2024 07:00  --------------------------------------------------------  IN: 240 mL / OUT: 2000 mL / NET: -1760 mL    PHYSICAL EXAM:  GENERAL: NAD, well-developed  HEAD:  Atraumatic, Normocephalic  EYES: +Scleral icterus. EOMI, PERRLA,  NECK: Supple, No JVD  CHEST/LUNG: Clear to auscultation bilaterally; No wheeze  HEART: Regular rate and rhythm; No murmurs, rubs, or gallops  ABDOMEN: Soft, Nontender, Nondistended; Bowel sounds present  EXTREMITIES:  2+ Peripheral Pulses, No clubbing, cyanosis, or edema  PSYCH: AAOx3  NEUROLOGY: non-focal  SKIN: No rashes or lesions    LABS:                        9.0    6.03  )-----------( 297      ( 29 Apr 2024 04:43 )             28.0     04-29    132<L>  |  100  |  8   ----------------------------<  126<H>  3.5   |  20<L>  |  0.34<L>    Ca    8.8      29 Apr 2024 10:52  Phos  2.7     04-29  Mg     1.60     04-29    TPro  6.6  /  Alb  3.1<L>  /  TBili  19.1<H>  /  DBili  x   /  AST  61<H>  /  ALT  38<H>  /  AlkPhos  333<H>  04-29    PT/INR - ( 29 Apr 2024 04:43 )   PT: 16.9 sec;   INR: 1.53 ratio       PTT - ( 29 Apr 2024 04:43 )  PTT:36.8 sec    Urinalysis Basic - ( 29 Apr 2024 10:52 )    Color: x / Appearance: x / SG: x / pH: x  Gluc: 126 mg/dL / Ketone: x  / Bili: x / Urobili: x   Blood: x / Protein: x / Nitrite: x   Leuk Esterase: x / RBC: x / WBC x   Sq Epi: x / Non Sq Epi: x / Bacteria: x    RADIOLOGY & ADDITIONAL TESTS:    Imaging Personally Reviewed:    Consultant(s) Notes Reviewed:      Care Discussed with Consultants/Other Providers:    Josep Patel MD, Internal Medicine Resident

## 2024-04-30 NOTE — PROGRESS NOTE ADULT - SUBJECTIVE AND OBJECTIVE BOX
[facility] Geriatrics and Palliative Care  Paola Ellis MD, Palliative Care Attending  Contact Info: Page [phone] (including Nights/Weekends), message on Microsoft Teams (Paola Ellis MD), or leave VM at Palliative Office [phone] (non-urgent)   Date of [id number] @ 5:36 PM    SUBJECTIVE AND OBJECTIVE:  Patient seen this morning at bedside. No family present  Shared ERCP w/ stent was not successful but she does have less itching  She's now been offered IR biliary draining which she shared might not help and as such she's considering options  Her goal remains for control of symtoms    Indication for Geriatrics and Palliative Care Services/INTERVAL HPI: goals of care     OVERNIGHT EVENTS:  "ERCP aborted given severely stenosed duodenal bulb/sweep, unable to traverse."    DNR on chart:Intubate  Intubate      Allergies    fish (Anaphylaxis)  No Known Drug Allergies  coffee (Unknown)    Intolerances    MEDICATIONS  (STANDING):  calamine/zinc oxide Lotion 1 Application(s) Topical daily  chlorhexidine 2% Cloths 1 Application(s) Topical <User Schedule>  cholestyramine Powder (Sugar-Free) 4 Gram(s) Oral two times a day  cyanocobalamin 1000 MICROGram(s) Oral daily  folic acid 1 milliGRAM(s) Oral daily  sodium chloride 2 Gram(s) Oral three times a day  thiamine 100 milliGRAM(s) Oral daily    MEDICATIONS  (PRN):  melatonin 3 milliGRAM(s) Oral at bedtime PRN Insomnia      ITEMS UNCHECKED ARE NOT PRESENT    PRESENT SYMPTOMS: [ ]Unable to self-report - see [ ] CPOT [ ] PAINADS [ ] RDOS  Source if other than patient:  [ ]Family   [ ]Team     Pain:  [ ]yes [x ]no  QOL impact -   Location -                    Aggravating factors -  Quality -  Radiation -  Timing-  Severity (0-10 scale):  Minimal acceptable level/ pain goal (0-10 scale):     CPOT:    https://www.sccm.org/getattachment/bly69b66-4j2r-9n8e-3v9m-6982c7918g4u/Critical-Care-Pain-Observation-Tool-(CPOT)    Dyspnea:                           [ ]Mild [ ]Moderate [ ]Severe  Anxiety:                             [ ]Mild [ ]Moderate [ ]Severe  Fatigue:                             [ ]Mild [ ]Moderate [ ]Severe  Nausea:                             [ ]Mild [ ]Moderate [ ]Severe  Loss of appetite:              [ x]Mild [ ]Moderate [ ]Severe  Constipation:                    [ ]Mild [ ]Moderate [ ]Severe  Other Symptoms:  [x ]All other review of systems negative     PCSSQ[Palliative Care Spiritual Screening Question]   Severity (0-10):  Score of 4 or > indicate consideration of Chaplaincy referral.  Chaplaincy Referral: [ ] yes [ ] refused [ ] following [ ] deferred    Caregiver [place]? : [ ] yes [ ] no [ ] Deferred [ ] Declined             Social work referral [ ] Patient & Family Centered Care Referral [ ]  Anticipatory Grief present?:  [ ] yes [ ] no  [ ] Deferred                  Social work referral [ ] Patient & Family Centered Care Referral [ ]      PHYSICAL EXAM:  Vital Signs Last 24 Hrs  T(C): 36.8 (30 Apr 2024 13:08), Max: 36.8 (30 Apr 2024 13:08)  T(F): 98.2 (30 Apr 2024 13:08), Max: 98.2 (30 Apr 2024 13:08)  HR: 70 (30 Apr 2024 13:08) (64 - 73)  BP: 145/67 (30 Apr 2024 13:08) (127/62 - 145/67)  BP(mean): --  RR: 18 (30 Apr 2024 13:08) (18 - 20)  SpO2: 100% (30 Apr 2024 13:08) (99% - 100%)    Parameters below as of 30 Apr 2024 13:08  Patient On (Oxygen Delivery Method): room air     General: alert, NAD  HEENT: +scleral icterus; face relaxed  Chest: symmetric excursion, no accessory muscle use  Heart: peripheral pulse 2+ and regular  Lungs: no dyspnea with speech  Abdomen: soft, NT, ND  Ext: no cyanosis, clubbing, visible veins, ulcers, wounds  Neuro: alert, coherent speech, grossly non-focal  Psych: calm, rational, linear thought process  Skin: ]Normal  [ ]Rash  [x ]Other - itching  [ ]Pressure ulcer(s) [ ]y [ ]n present on admission    CRITICAL CARE:  [ ]Shock Present  [ ]Septic [ ]Cardiogenic [ ]Neurologic [ ]Hypovolemic  [ ]Vasopressors [ ]Inotropes  [ ]Respiratory failure present [ ]Mechanical Ventilation [ ]Non-invasive ventilatory support [ ]High-Flow   [ ]Acute  [ ]Chronic [ ]Hypoxic  [ ]Hypercarbic [ ]Other  [ ]Other organ failure                LABS:                          9.0    6.03  )-----------( 297      ( 29 Apr 2024 04:43 )             28.0     04-29    132<L>  |  100  |  8   ----------------------------<  126<H>  3.5   |  20<L>  |  0.34<L>    Ca    8.8      29 Apr 2024 10:52  Phos  2.7     04-29  Mg     1.60     04-29    TPro  6.6  /  Alb  3.1<L>  /  TBili  19.1<H>  /  DBili  x   /  AST  61<H>  /  ALT  38<H>  /  AlkPhos  333<H>  04-29    PT/INR - ( 29 Apr 2024 04:43 )   PT: 16.9 sec;   INR: 1.53 ratio         PTT - ( 29 Apr 2024 04:43 )  PTT:36.8 sec              RADIOLOGY & ADDITIONAL STUDIES:   IR was consulted for percutaneous biliary drainage.     -- case discussed with Dr. Lozano, placement of percutaneous biliary drain can be offered.   -- IR will tentatively plan to perform percutaneous biliary drainage on 4/30 pending discussion of the risks and benefits of the procedure with the patient. The biliary drain will likely be long-standing and require periodic exchanges. Please ensure that this procedure is in line with patient's goals of care and code status. Metal biliary stent placement likely to fail given extent of disease, particularly with the extent of involvement of the duodenum. Additionally, a second drain may be needed if disease were to progress and there be further isolation of biliary ducts.   -- if patient is agreeable to proceed with the procedure and the above conditions have been fulfilled:      -- NPO at midnight on 4/30      -- hold lovenox 12 hours prior to procedure      -- please complete IR pre-procedure note      -- please place IR procedure request order under Dr. Lozano      < end of copied text >    Protein Calorie Malnutrition Present: [ ]mild [ ]moderate [ ]severe [ ]underweight [ ]morbid obesity  https://www.andeal.org/vault/2315/web/files/ONC/Table_Clinical%20Characteristics%20to%20Document%20Malnutrition-White%20JV%20et%20al%202012.pdf    Height (cm): 172.7 (04-24-24 @ 01:35)  Weight (kg): 60.3 (04-24-24 @ 01:35)  BMI (kg/m2): 20.2 (04-24-24 @ 01:35)    [ ]PPSV2 < or = 30%  [ ]significant weight loss [ ]poor nutritional intake [ ]anasarca[ ]Artificial Nutrition    Other REFERRALS:  [ ]Hospice  [ ]Child Life  [ ]Social Work  [ ]Case management [ ]Holistic Therapy     Goals of Care Document:

## 2024-04-30 NOTE — PROGRESS NOTE ADULT - PROBLEM SELECTOR PLAN 1
Presents with generalized malaise, weight loss, jaundice and itchiness. Found to have 1.4cm pancreatic mass on CT imaging with marked pancreatic and biliary ductal dilation, concern for adenocarcinoma. Also with mass-like nonspecific fullness of pancreatic head.  - Patient denies pulmonary symptoms regarding chest involvement  - Lipase elevated to 371, however CT with nonspecific findings and patient without significant pain currently  - GOC initiated with patient and family, amenable to symptom-relief and possible biopsy, pt does not want treatment for cancer, amenable to hospice care  - GI consulted, recs appreciated  - Scheduled for ERCP and possible biliary stent placement 4/26 - cancelled due to hyponatremia. Rescheduled for 4/29 - unable to place stent due  - IR consulted for percutaneous biliary drain - patient and family deciding if within GOC

## 2024-04-30 NOTE — PROGRESS NOTE ADULT - PROBLEM SELECTOR PLAN 4
GOC: Spoke to pt and later her niece Yu and they're now opting to decline biliary draining after weighing risk benefits. At this time after medical management, they're amenable to home with hospice referral as pt does not wish to undergo biopsy or treatment for presumed cancer. Readdressed code status with Yu and ultimately they wish to continue DNR but trial of intubation. Recommend CM to make home hospice referral     MOLST completed by primary team 4/23: DNR/Intubate -- discussed with niece and son recommending against intubation as patient doesn't want invasive procedures however they prefer to speak to her further before changing anything.  Updated molst  to include no blood transfusion as pt is Jehova's witness.    Surrogates: Raul Her [phone]; niece: Yu who helps with decisions -- [phone]

## 2024-04-30 NOTE — PROGRESS NOTE ADULT - PROBLEM SELECTOR PLAN 2
Pt with it elevated bilirubin >20  CT:  Intrahepatic and extrahepatic biliary dilation. Common bile duct measures up to 1.4 cm.  GI consulted, discussed option for EGD/stenting/biopsy  Pt was amenable to endoscopic stenting for symptoms relief;   Unsuccessful ERCP with stent placement d/t severe stenosis at duodenum  IR consulted for biliary drain; pt deciding whether to pursue  Per conversation with her niece this afternoon, pt is now declining biliary drain placement after weighing risks/benefits  Recommend trial of mirtazapine 7.5 mg qhs for pruritus and appetite stimulation

## 2024-04-30 NOTE — PROGRESS NOTE ADULT - SUBJECTIVE AND OBJECTIVE BOX
Cardiovascular Disease Progress Note  DATE OF SERVICE: 04-30-24 @ 09:27    Overnight events: No acute events overnight.    The patient is in no distress.   Otherwise review of systems negative    Objective Findings:  T(C): 36.3 (04-30-24 @ 05:11), Max: 36.7 (04-29-24 @ 16:40)  HR: 64 (04-30-24 @ 05:11) (64 - 92)  BP: 127/62 (04-30-24 @ 05:11) (127/62 - 158/75)  RR: 19 (04-30-24 @ 05:11) (12 - 20)  SpO2: 100% (04-30-24 @ 05:11) (99% - 100%)  Wt(kg): --  Daily Height in cm: 172.7 (29 Apr 2024 13:43)    Daily       Physical Exam:  Gen: NAD; Patient resting comfortably  HEENT: EOMI, Normocephalic/ atraumatic  CV: RRR, normal S1 + S2, systolic murmur  Lungs:  Normal respiratory effort; clear to auscultation bilaterally  Abd: soft,   bowel sounds present  Ext: No edema; warm and well perfused      Telemetry: n/a    Laboratory Data:                        9.0    6.03  )-----------( 297      ( 29 Apr 2024 04:43 )             28.0     04-29    132<L>  |  100  |  8   ----------------------------<  126<H>  3.5   |  20<L>  |  0.34<L>    Ca    8.8      29 Apr 2024 10:52  Phos  2.7     04-29  Mg     1.60     04-29    TPro  6.6  /  Alb  3.1<L>  /  TBili  19.1<H>  /  DBili  x   /  AST  61<H>  /  ALT  38<H>  /  AlkPhos  333<H>  04-29    PT/INR - ( 29 Apr 2024 04:43 )   PT: 16.9 sec;   INR: 1.53 ratio         PTT - ( 29 Apr 2024 04:43 )  PTT:36.8 sec          Inpatient Medications:  MEDICATIONS  (STANDING):  calamine/zinc oxide Lotion 1 Application(s) Topical daily  chlorhexidine 2% Cloths 1 Application(s) Topical <User Schedule>  cholestyramine Powder (Sugar-Free) 4 Gram(s) Oral two times a day  ciprofloxacin   IVPB 400 milliGRAM(s) IV Intermittent once  cyanocobalamin 1000 MICROGram(s) Oral daily  folic acid 1 milliGRAM(s) Oral daily  indomethacin Suppository 100 milliGRAM(s) Rectal once  sodium chloride 2 Gram(s) Oral three times a day  thiamine 100 milliGRAM(s) Oral daily      Assessment: 89 year old woman with HTN, HLD, aortic stenosis, and peripheral vascular disease presents with painless jaundice     Plan of Care:    #Cardiac risk stratification  - Of note patient has moderate to severe aortic stenosis   Ms. Small tolerated ERCP well.  - She is at elevated, but not prohibitive, risk for IR guided biliary intervention.   - Please use vasoplegic anesthesia induction judiciously, as the patient is preload dependent in the setting of her valvular disease.   The patient is optimized from a cardiac standpoint to proceed.  Management of electrolyte abnormalities as per the primary team.     #HTN  - BP acceptable on current regimen    #HLD  - Statin stopped  in setting of transaminitis    #Aortic stenosis-  Conservative management as per patient wishes.            Over 55 minutes spent on total encounter; more than 50% of the visit was spent counseling and/or coordinating care by the attending physician.      Jamie Bella MD [facility]  Cardiovascular Disease  [phone]

## 2024-04-30 NOTE — PROGRESS NOTE ADULT - PROBLEM SELECTOR PLAN 5
Thank you for allowing us to participate in your patient's care. As goals are clear, we will sign off at this time.   Please page [phone] for any q's or c's.    Paola Ellis MD  Palliative Medicine

## 2024-04-30 NOTE — PROGRESS NOTE ADULT - TIME BILLING
Time spent for extensive review of the physical chart, electronic medical record, and documentation to obtain collateral information including but not limited to:  [x ] Inpatient records (ED, H&P, primary team, and consultants if applicable, care coordination)  [x] Inpatient values/results (biomarkers, immunoassays, imaging, and microbiology results)  [x] Current or proposed treatment plans  [x] Discussion with the primary team  [x] Discussion with the patient, surrogate decision maker, or family    Time spent: >35
Time spent for extensive review of the physical chart, electronic medical record, and documentation to obtain collateral information including but not limited to:  [x ] Inpatient records (ED, H&P, primary team, and consultants if applicable, care coordination)  [x] Inpatient values/results (biomarkers, immunoassays, imaging, and microbiology results)  [x] Current or proposed treatment plans  [x] Discussion with the primary team  [x] Discussion with the patient, surrogate decision maker, or family    Time spent: >35 mins (separate from ACP)

## 2024-04-30 NOTE — PROGRESS NOTE ADULT - PROBLEM SELECTOR PLAN 3
Presents with pruritis, jaundice, and elevated Bilirubin, Alk Phos, and AST, likely in setting of obstruction/compression by pancreatic mass  - CT imaging findings as above  - R factor 0.3, cholestatic  - No leukocytosis, no abdominal pain nor tenderness, no fevers/chills lately, no diarrhea nor signs/symptoms of possible colitis mentioned on CT  - sequelae of chronic cholecystitis likely iso above CT findings  - hepatitis panel negative   - f/u GI recs regarding possible stent - cancelled 2/2 to hyponatremia - rescheduled for 4/29 Presents with pruritis, jaundice, and elevated Bilirubin, Alk Phos, and AST, likely in setting of obstruction/compression by pancreatic mass  - CT imaging findings as above  - R factor 0.3, cholestatic  - No leukocytosis, no abdominal pain nor tenderness, no fevers/chills lately, no diarrhea nor signs/symptoms of possible colitis mentioned on CT  - sequelae of chronic cholecystitis likely iso above CT findings  - hepatitis panel negative   - S/p unsuccessful biliary stent placement (4/29) - patient deciding on possible percutaneous biliary drain.

## 2024-04-30 NOTE — PROGRESS NOTE ADULT - PROBLEM SELECTOR PLAN 1
Pt presented with jaundice and pruritus found to have elevated bilirubin >20  CT: 1.4 cm mass within the uncinate process of the pancreas with marked pancreatic and biliary ductal   dilatation, suspicious for adenocarcinoma.  Also   Per conversation with patient and family at this time they've opted to forgo biopsy as she would not want to undergo treatment for cancer. Was amenable to GI ERCP with stent for symptoms relief.  Unsuccessful stent placement d/t stenosis on 4/29; IR consulted for biliary drain

## 2024-04-30 NOTE — PROGRESS NOTE ADULT - CONVERSATION DETAILS
Palliative consulted for complex medical decision making and symptom management in the setting of serious illness.  Follow up conversation today with patient and later via telephone with her niece Yu (who serves as a surrogate) regarding treatment options and goals of care.     In summary, as patient's ERCP with stent not successful d/t stenosis, they were offered IR biliary drain and after weighing risks/benefits, Yu shared patient is opting to forgo procedure as "there's no guarantee" it will be successful in symptoms relief and pt may have further complications down the line.     Confirmed with Yu that patient is opting to forgo biopsy and cancer directed treatment and that goal remains for symptoms directed care with hospice which she confirmed. She was amenable for discharge home with hospice services once medically stable.     Readdressed code status, DNR but trial of intubation. Reviewed the risks and benefits of intubation/ventilation in the setting of presumed malignancy with advanced age, sharing that these interventions might pose more burden than benefit. Yu expressed understanding but shared that they would like to honor what patient wants and continue trial of intubation for now.

## 2024-05-01 PROCEDURE — 99232 SBSQ HOSP IP/OBS MODERATE 35: CPT | Mod: GC

## 2024-05-01 RX ADMIN — CHOLESTYRAMINE 4 GRAM(S): 4 POWDER, FOR SUSPENSION ORAL at 21:47

## 2024-05-01 RX ADMIN — Medication 100 MILLIGRAM(S): at 11:26

## 2024-05-01 RX ADMIN — Medication 1 MILLIGRAM(S): at 11:26

## 2024-05-01 RX ADMIN — MIRTAZAPINE 7.5 MILLIGRAM(S): 45 TABLET, ORALLY DISINTEGRATING ORAL at 21:47

## 2024-05-01 RX ADMIN — PREGABALIN 1000 MICROGRAM(S): 225 CAPSULE ORAL at 11:26

## 2024-05-01 RX ADMIN — CALAMINE AND ZINC OXIDE AND PHENOL 1 APPLICATION(S): 160; 10 LOTION TOPICAL at 11:26

## 2024-05-01 NOTE — MEDICAL STUDENT PROGRESS NOTE(EDUCATION) - ASSESSMENT
89F with PMH of HTN, HLD, [place], and peripheral vascular disease presenting to the ED for elevated LFTs over the past 3 weeks with symptoms of painless jaundice and itchiness, found to have elevated Bilirubin and Alk Phos with CT findings of CBD dilatation iso pancreatic mass. S/o unsuccessful biliary stent placement, pending possible percutaneous biliary drain.       Problem/Plan - 1:  ·  Problem: Pancreatic mass.   ·  Plan: Presents with generalized malaise, weight loss, jaundice and itchiness. Found to have 1.4cm pancreatic mass on CT imaging with marked pancreatic and biliary ductal dilation, concern for adenocarcinoma. Also with mass-like nonspecific fullness of pancreatic head.  - Patient denies pulmonary symptoms regarding chest involvement  - Lipase elevated to 371, however CT with nonspecific findings and patient without significant pain currently  - GOC initiated with patient and family, amenable to symptom-relief and possible biopsy, pt does not want treatment for cancer, amenable to hospice care  - GI consulted, recs appreciated  - Scheduled for ERCP and possible biliary stent placement 4/26 - cancelled due to hyponatremia. Rescheduled for 4/29 - unable to place stent due  - IR consulted for percutaneous biliary drain - patient and family deciding if within GOC.     Problem/Plan - 2:  ·  Problem: Anemia.   ·  Plan: Patient with downtrending hgb, likely in the setting of chronic disease. Patient with last bright red BM on 4/27.  - No blood products; patient is [employer].  - GI following, recs appreciated  - Heme consulted, recs appreciated  - F/u iron studies, B12, folate, retic count - indicative of anemia of chronic disease  - Daily folate supplementation, no epo due to malignancy  - D/c lovenox, anti-HTNs  - Stool count.     Problem/Plan - 3:  ·  Problem: Cholestasis.   ·  Plan: Presents with pruritis, jaundice, and elevated Bilirubin, Alk Phos, and AST, likely in setting of obstruction/compression by pancreatic mass  - CT imaging findings as above  - R factor 0.3, cholestatic  - No leukocytosis, no abdominal pain nor tenderness, no fevers/chills lately, no diarrhea nor signs/symptoms of possible colitis mentioned on CT  - sequelae of chronic cholecystitis likely iso above CT findings  - hepatitis panel negative   - S/p unsuccessful biliary stent placement (4/29) - patient deciding on possible percutaneous biliary drain.  - Trial mirtazapine     Problem/Plan - 4:  ·  Problem: Pruritus.   ·  Plan: Likely iso cholestasis given above. No rashes or lesions visualized.  - Can cont with antihistamine to assess if improvement  - Trial Cholestyramine BID  - Limit benadryl due to sedating effects  - Calamine lotion.  - Trial mirtazapine     Problem/Plan - 5:  ·  Problem: Hyponatremia.   ·  Plan: Patient hyponatremic to 129 on admission, as low as 123 on 4/26. Likely in the setting of malignancy, decreased PO intake, vs SIADH.  - Serum osm: 266, urine osm: 637, urine Na: 147.  - TSH wnl, f/u AM cortisol  - Salt tabs 2g TID  - Optimize glucose control  - Fluid restriction to 1L, d/c DASH diet  - Caution not to overcorrect >6-8 within 24h period.     Problem/Plan - 6:  ·  Problem: Hypokalemia.   ·  Plan: K 3.2 on admission as well as mild hyponatremia on admission, likely iso poor PO intake given poor appetite and weight loss  - cont to trend electrolytes and replete PRN.     Problem/Plan - 7:  ·  Problem: Aortic stenosis.   ·  Plan: CT findings extensive aortic valve leaflet calcifications, systolic murmur appreciated on exam  - denies any significant change or decrease in functional status but baseline functional status is low given her age  - no significant swelling nor SOB at rest  - f/u TTE - mod to severe AS, EF of 63%  - Cardiology consult for cardiac clearance, recs appreciated.     Problem/Plan - 8:  ·  Problem: UTI (urinary tract infection).   ·  Plan: UA with WBC and many bacteria. Endorses burning with urination. RESOLVED  - CT abd/pelvis with findings of mild thickening of the bladder base with prominence of the proximal urethra  - Ceftriaxone for simple cystitis.  -Ceftriaxone completed     Problem/Plan - 9:  ·  Problem: Hypertension.   ·  Plan: On Diovan and Natrilix at home  - valsartan d/c.     Problem/Plan - 10:  ·  Problem: Need for prophylactic measure.   ·  Plan; DVT ppx: SCDs  Diet: Regular, no coffee/fish  Ethics: DNR/intubate per discussion overnight (unclear if time of night affected discussion). Was previously DNR/DNI per family members.

## 2024-05-01 NOTE — PROGRESS NOTE ADULT - PROBLEM SELECTOR PLAN 10
DVT ppx: SCDs  Diet: Regular, no coffee/fish  Ethics: DNR/intubate per discussion overnight (unclear if time of night affected discussion). Was previously DNR/DNI per family members.

## 2024-05-01 NOTE — PROGRESS NOTE ADULT - PROBLEM SELECTOR PLAN 2
Patient with downtrending hgb, likely in the setting of chronic disease. Patient with last bright red BM on 4/27.  - No blood products; patient is [place].  - GI following, recs appreciated  - Heme consulted, recs appreciated  - F/u iron studies, B12, folate, retic count - indicative of anemia of chronic disease  - Daily folate supplementation, no epo due to malignancy  - D/c lovenox, anti-HTNs  - Stool count

## 2024-05-01 NOTE — PROGRESS NOTE ADULT - PROBLEM SELECTOR PLAN 4
Likely iso cholestasis given above. No rashes or lesions visualized.  - Can cont with antihistamine to assess if improvement  - Trial Cholestyramine BID  - Limit benadryl due to sedating effects  - Calamine lotion Likely iso cholestasis given above. No rashes or lesions visualized.  - Can cont with antihistamine to assess if improvement  - Trial Cholestyramine BID  - Limit benadryl due to sedating effects  - Calamine lotion  - Mirtazepine 7.5 qD

## 2024-05-01 NOTE — PROVIDER CONTACT NOTE (OTHER) - ACTION/TREATMENT ORDERED:
MD garcia.
Provider at bedside, will await further orders
MD aware, will await further orders
will come see pt
provider aware

## 2024-05-01 NOTE — PROGRESS NOTE ADULT - SUBJECTIVE AND OBJECTIVE BOX
Cardiovascular Disease Progress Note  DATE OF SERVICE: 05-01-24 @ 08:05    Overnight events: No acute events overnight.   The patient denies pain.    Otherwise review of systems negative    Objective Findings:  T(C): 36.8 (05-01-24 @ 05:44), Max: 36.8 (04-30-24 @ 13:08)  HR: 75 (05-01-24 @ 05:44) (70 - 80)  BP: 150/70 (05-01-24 @ 05:44) (139/62 - 150/70)  RR: 17 (05-01-24 @ 05:44) (17 - 18)  SpO2: 100% (05-01-24 @ 05:44) (100% - 100%)  Wt(kg): --  Daily     Daily       Physical Exam:  Gen: NAD; Patient resting comfortably  HEENT: EOMI, Normocephalic/ atraumatic  CV: RRR, normal S1 + S2, no m/r/g  Lungs:  Normal respiratory effort; clear to auscultation bilaterally  Abd: soft,  bowel sounds present  Ext: No edema; warm and well perfused    Telemetry: n/a    Laboratory Data:    04-29    132<L>  |  100  |  8   ----------------------------<  126<H>  3.5   |  20<L>  |  0.34<L>    Ca    8.8      29 Apr 2024 10:52                Inpatient Medications:  MEDICATIONS  (STANDING):  calamine/zinc oxide Lotion 1 Application(s) Topical daily  chlorhexidine 2% Cloths 1 Application(s) Topical <User Schedule>  cholestyramine Powder (Sugar-Free) 4 Gram(s) Oral two times a day  cyanocobalamin 1000 MICROGram(s) Oral daily  folic acid 1 milliGRAM(s) Oral daily  mirtazapine 7.5 milliGRAM(s) Oral at bedtime  sodium chloride 2 Gram(s) Oral three times a day  thiamine 100 milliGRAM(s) Oral daily      Assessment: 89 year old woman with HTN, HLD, aortic stenosis, and peripheral vascular disease presents with painless jaundice     Plan of Care:      #Aortic stenosis-  Conservative management as per patient and family wishes.     #HTN  - BP acceptable on current regimen    #HLD  - Statin stopped  in setting of transaminitis           Over 55 minutes spent on total encounter; more than 50% of the visit was spent counseling and/or coordinating care by the attending physician.      Jamie Bella MD [facility]  Cardiovascular Disease  [phone]

## 2024-05-01 NOTE — PROGRESS NOTE ADULT - PROBLEM SELECTOR PLAN 3
Presents with pruritis, jaundice, and elevated Bilirubin, Alk Phos, and AST, likely in setting of obstruction/compression by pancreatic mass  - CT imaging findings as above  - R factor 0.3, cholestatic  - No leukocytosis, no abdominal pain nor tenderness, no fevers/chills lately, no diarrhea nor signs/symptoms of possible colitis mentioned on CT  - sequelae of chronic cholecystitis likely iso above CT findings  - hepatitis panel negative   - S/p unsuccessful biliary stent placement (4/29) - patient deciding on possible percutaneous biliary drain. Presents with pruritis, jaundice, and elevated Bilirubin, Alk Phos, and AST, likely in setting of obstruction/compression by pancreatic mass  - CT imaging findings as above  - R factor 0.3, cholestatic  - No leukocytosis, no abdominal pain nor tenderness, no fevers/chills lately, no diarrhea nor signs/symptoms of possible colitis mentioned on CT  - sequelae of chronic cholecystitis likely iso above CT findings  - hepatitis panel negative   - S/p unsuccessful biliary stent placement (4/29) - patient declining percutaneous biliary drain.

## 2024-05-01 NOTE — PROGRESS NOTE ADULT - SUBJECTIVE AND OBJECTIVE BOX
Patient is a 89y old  Female who presents with a chief complaint of Pancreatic mass, abnormal lab-work    SUBJECTIVE / OVERNIGHT EVENTS: Patient seen and examined at bedside. No overnight events.    MEDICATIONS  (STANDING):  calamine/zinc oxide Lotion 1 Application(s) Topical daily  chlorhexidine 2% Cloths 1 Application(s) Topical <User Schedule>  cholestyramine Powder (Sugar-Free) 4 Gram(s) Oral two times a day  cyanocobalamin 1000 MICROGram(s) Oral daily  folic acid 1 milliGRAM(s) Oral daily  mirtazapine 7.5 milliGRAM(s) Oral at bedtime  sodium chloride 2 Gram(s) Oral three times a day  thiamine 100 milliGRAM(s) Oral daily    MEDICATIONS  (PRN):  melatonin 3 milliGRAM(s) Oral at bedtime PRN Insomnia    Vital Signs Last 24 Hrs  T(C): 36.8 (01 May 2024 05:44), Max: 36.8 (30 Apr 2024 13:08)  T(F): 98.3 (01 May 2024 05:44), Max: 98.3 (01 May 2024 05:44)  HR: 75 (01 May 2024 05:44) (70 - 80)  BP: 150/70 (01 May 2024 05:44) (139/62 - 150/70)  BP(mean): --  RR: 17 (01 May 2024 05:44) (17 - 18)  SpO2: 100% (01 May 2024 05:44) (100% - 100%)    Parameters below as of 01 May 2024 05:44  Patient On (Oxygen Delivery Method): room air    CAPILLARY BLOOD GLUCOSE    I&O's Summary    PHYSICAL EXAM:  GENERAL: NAD, well-developed  HEAD:  Atraumatic, Normocephalic  EYES: +Scleral icterus. EOMI, PERRLA,  NECK: Supple, No JVD  CHEST/LUNG: Clear to auscultation bilaterally; No wheeze  HEART: Regular rate and rhythm; No murmurs, rubs, or gallops  ABDOMEN: Soft, Nontender, Nondistended; Bowel sounds present  EXTREMITIES:  2+ Peripheral Pulses, No clubbing, cyanosis, or edema  PSYCH: AAOx3  NEUROLOGY: non-focal  SKIN: No rashes or lesions    LABS:    04-29    132<L>  |  100  |  8   ----------------------------<  126<H>  3.5   |  20<L>  |  0.34<L>    Ca    8.8      29 Apr 2024 10:52    Urinalysis Basic - ( 29 Apr 2024 10:52 )    Color: x / Appearance: x / SG: x / pH: x  Gluc: 126 mg/dL / Ketone: x  / Bili: x / Urobili: x   Blood: x / Protein: x / Nitrite: x   Leuk Esterase: x / RBC: x / WBC x   Sq Epi: x / Non Sq Epi: x / Bacteria: x    RADIOLOGY & ADDITIONAL TESTS:    Imaging Personally Reviewed:    Consultant(s) Notes Reviewed:      Care Discussed with Consultants/Other Providers:    Josep Patle MD, Internal Medicine Resident Patient is a 89y old  Female who presents with a chief complaint of Pancreatic mass, abnormal lab-work    SUBJECTIVE / OVERNIGHT EVENTS: Patient seen and examined at bedside. No overnight events. Patient without acute concerns this AM, state her itching is unchanged. After discussion with family, patient deciding not to undergo biliary drain procedure at this time. Denies CP, SOB, subjective fever, chills, n/v/d.    MEDICATIONS  (STANDING):  calamine/zinc oxide Lotion 1 Application(s) Topical daily  chlorhexidine 2% Cloths 1 Application(s) Topical <User Schedule>  cholestyramine Powder (Sugar-Free) 4 Gram(s) Oral two times a day  cyanocobalamin 1000 MICROGram(s) Oral daily  folic acid 1 milliGRAM(s) Oral daily  mirtazapine 7.5 milliGRAM(s) Oral at bedtime  sodium chloride 2 Gram(s) Oral three times a day  thiamine 100 milliGRAM(s) Oral daily    MEDICATIONS  (PRN):  melatonin 3 milliGRAM(s) Oral at bedtime PRN Insomnia    Vital Signs Last 24 Hrs  T(C): 36.8 (01 May 2024 05:44), Max: 36.8 (30 Apr 2024 13:08)  T(F): 98.3 (01 May 2024 05:44), Max: 98.3 (01 May 2024 05:44)  HR: 75 (01 May 2024 05:44) (70 - 80)  BP: 150/70 (01 May 2024 05:44) (139/62 - 150/70)  BP(mean): --  RR: 17 (01 May 2024 05:44) (17 - 18)  SpO2: 100% (01 May 2024 05:44) (100% - 100%)    Parameters below as of 01 May 2024 05:44  Patient On (Oxygen Delivery Method): room air    CAPILLARY BLOOD GLUCOSE    I&O's Summary    PHYSICAL EXAM:  GENERAL: NAD, well-developed  HEAD:  Atraumatic, Normocephalic  EYES: +Scleral icterus. EOMI, PERRLA,  NECK: Supple, No JVD  CHEST/LUNG: Clear to auscultation bilaterally; No wheeze  HEART: Regular rate and rhythm; No murmurs, rubs, or gallops  ABDOMEN: Soft, Nontender, Nondistended; Bowel sounds present  EXTREMITIES:  2+ Peripheral Pulses, No clubbing, cyanosis, or edema  PSYCH: AAOx3  NEUROLOGY: non-focal  SKIN: No rashes or lesions    LABS:    04-29    132<L>  |  100  |  8   ----------------------------<  126<H>  3.5   |  20<L>  |  0.34<L>    Ca    8.8      29 Apr 2024 10:52    Urinalysis Basic - ( 29 Apr 2024 10:52 )    Color: x / Appearance: x / SG: x / pH: x  Gluc: 126 mg/dL / Ketone: x  / Bili: x / Urobili: x   Blood: x / Protein: x / Nitrite: x   Leuk Esterase: x / RBC: x / WBC x   Sq Epi: x / Non Sq Epi: x / Bacteria: x    RADIOLOGY & ADDITIONAL TESTS:    Imaging Personally Reviewed:    Consultant(s) Notes Reviewed:      Care Discussed with Consultants/Other Providers:    Josep Patel MD, Internal Medicine Resident

## 2024-05-01 NOTE — PROGRESS NOTE ADULT - ASSESSMENT
89F with PMH of HTN, HLD, [place], and peripheral vascular disease presenting to the ED for elevated LFTs over the past 3 weeks with symptoms of painless jaundice and itchiness, found to have elevated Bilirubin and Alk Phos with CT findings of CBD dilatation iso pancreatic mass. S/o unsuccessful biliary stent placement, pending possible percutaneous biliary drain.

## 2024-05-01 NOTE — PROGRESS NOTE ADULT - PROBLEM SELECTOR PLAN 1
Presents with generalized malaise, weight loss, jaundice and itchiness. Found to have 1.4cm pancreatic mass on CT imaging with marked pancreatic and biliary ductal dilation, concern for adenocarcinoma. Also with mass-like nonspecific fullness of pancreatic head.  - Patient denies pulmonary symptoms regarding chest involvement  - Lipase elevated to 371, however CT with nonspecific findings and patient without significant pain currently  - GOC initiated with patient and family, amenable to symptom-relief and possible biopsy, pt does not want treatment for cancer, amenable to hospice care  - GI consulted, recs appreciated  - Scheduled for ERCP and possible biliary stent placement 4/26 - cancelled due to hyponatremia. Rescheduled for 4/29 - unable to place stent due  - IR consulted for percutaneous biliary drain - patient and family deciding if within GOC Presents with generalized malaise, weight loss, jaundice and itchiness. Found to have 1.4cm pancreatic mass on CT imaging with marked pancreatic and biliary ductal dilation, concern for adenocarcinoma. Also with mass-like nonspecific fullness of pancreatic head.  - Patient denies pulmonary symptoms regarding chest involvement  - Lipase elevated to 371, however CT with nonspecific findings and patient without significant pain currently  - GOC initiated with patient and family, amenable to symptom-relief and possible biopsy, pt does not want treatment for cancer, amenable to hospice care  - GI consulted, recs appreciated  - Scheduled for ERCP and possible biliary stent placement 4/26 - cancelled due to hyponatremia. Rescheduled for 4/29 - unable to place stent  - Patient declining percutaneous biliary drain

## 2024-05-01 NOTE — PROGRESS NOTE ADULT - ATTENDING COMMENTS
Agree with above  ERCP for biliary decompression given pruritus and GOC (Pt not interested in FNA for diagnosis)
89F with PMH of HTN, HLD, [place], and peripheral vascular disease presenting to the ED for elevated LFTs over the past 3 weeks with symptoms of painless jaundice and itchiness, found to have elevated Bilirubin to 20 in setting of pancreatic mass. Discussed workup of pancreatic mass with patient, son and niece. Patient currently would not like further diagnostic testing, management or treatment of suspected pancreatic cancer. Patient would like to consider ERCP if it helps to symptomatically improve itching.     Noted to have worsening hyponatremia on 4/26 from 129 to 123, confirmed to be 125 on repeat. TSH WNL. Check AM cortisol. Start salt tabs as this is likely SIADH (tumor related?).     Patient also with bright red blood per rectum on 4/26. Use pediatric tubes as patient is a [employer]. Check iron panel, b12, folate, retic count. If low on iron, start IV iron.     F/u GI recs regarding ERCP which will likely be delayed to Monday given hyponatremia.     Echo with moderate to severe AS. Cardiology recs appreciated. Obtain EKG       Manage pruritis secondary to hyperbilirubinemia with cholestyramine.      Treat for simple cystitis with 3 day course of CTX     Palliative consult appreciated     Discussed with HS 5
89F with PMH of HTN, HLD, [place], and peripheral vascular disease presenting to the ED for elevated LFTs over the past 3 weeks with symptoms of painless jaundice and itchiness, found to have elevated Bilirubin to 20 in setting of pancreatic mass. Discussed workup of pancreatic mass with patient, son and niece. Patient currently would not like further diagnostic testing, management or treatment of suspected pancreatic cancer. Patient would like to consider ERCP if it helps to symptomatically improve itching.     F/u GI recs regarding ERCP     Manage pruritis secondary to hyperbilirubinemia with cholestyramine.      Treat for simple cystitis with 3 day course of CTX     f/u echo to workup concern for AS on CT scan      Palliative consult appreciated     Discussed with HS 5
89F with PMH of HTN, HLD, [place], and peripheral vascular disease presenting to the ED for elevated LFTs over the past 3 weeks with symptoms of painless jaundice and itchiness, found to have elevated Bilirubin in setting of pancreatic mass. Discussed workup of pancreatic mass with patient, son and niece. Patient still undecided about wanting to receive any treatment. If patient prefers not to receive treatment, can forgo biopsy and be discharged on home hospice. If patient would like pancreatic mass workup and treatment will consult advanced GI for ERCP/EUS.     Manage pruritis secondary to hyperbilirubinemia with cholestyramine.      Treat for simple cystitis with 3 day course of CTX     Check echo to workup for severe AS     Palliative consult appreciated     Discussed with HS 5
patient seen and examined at bedside. Pt happy she can eat regular food today but wants some vegetables to eat. had 1 BM this morning that was bloody. still undecided about colonoscopy.    #Pancreatic mass  -ERCP planned for Monday  -started on salt tabs for hyponatremia- monitor Na  -urine studies suggestive of SIADH  -NPO at midnight on Sunday    #Hematochezia  -1 episode today  -trend CBC  -pt is a [employer]- no blood transfusions to be given  -if pt continues to have further episodes of bleeding she will consider colonoscopy    d/w HS 5
patient seen and examined at bedside. pt states last night she woke up at 2 AM not feeling well and felt better after drinking some hot tea.     #Pancreatic mass  -ERCP planned for Monday for palliative stenting due to severe pruritis from hyperbilirubinemia  -started on salt tabs for hyponatremia- Na still low at 126, increased dose to 2g TID  -repeat BMP at midnight- if Na not at goal of 130 will give hypertonic saline  -urine studies suggestive of SIADH  -NPO at midnight    #Hematochezia  -1 episode on 4/27, no further episodes per pt  -trend CBC  -pt is a Muslim- no blood transfusions to be given  -if pt continues to have further episodes of bleeding, she will consider colonoscopy    d/w HS 5
patient seen and examined at bedside. pt states her itching is better today, it is still there but tolerable. she is undecided about getting a percutaneous biliary drain but feels it is not necessary at this point since she is feeling better.    #Pancreatic mass  -ERCP aborted due to stenosis of the duodenal bulb  -pt defers percutaneous biliary drain placement at this time, states her priority is to go home first and then she will think about getting the drain at a later date  -trend liver enzymes- pt with pruritis due to hyperbilirubinemia  -planning for d/c with home hospice    #Hyponatremia 2/2 SIADH  -Na stable, pt refusing salt tabs- can discontinue    #Hematochezia  -1 episode on 4/27, no further episodes per pt  -trend CBC  -pt is a [employer]- no blood transfusions to be given  -if pt continues to have further episodes of bleeding, she will consider colonoscopy    d/c planning    d/w HS 5
patient seen and examined at bedside. pt states her itching is better today, it is still there but tolerable. she is undecided about getting a percutaneous biliary drain but feels it is not necessary at this point since she is feeling better.    #Pancreatic mass  -ERCP aborted due to stenosis of the duodenal bulb  -IR consulted for perc biliary drain- pt undecided about procedure, will also d/w pt's family  -trend liver enzymes- pt with pruritis due to hyperbilirubinemia  -if no plan for perc biliary drain- will start d/c planning with likely home hospice services    #Hyponatremia 2/2 SIADH  -c/w salt tabs, monitor Na    #Hematochezia  -1 episode on 4/27, no further episodes per pt  -trend CBC  -pt is a Religion- no blood transfusions to be given  -if pt continues to have further episodes of bleeding, she will consider colonoscopy    d/w HS 5
patient seen and examined at bedside. pt states she feels very good in the morning. wants to go for her procedure today. ERCP attempted, however procedure was aborted due to stenosis of the duodenal bulb.    #Pancreatic mass  -ERCP aborted due to stenosis of the duodenal bulb  -IR consulted for perc biliary drain  -trend liver enzymes- pt with pruritis due to hyperbilirubinemia    #Hyponatremia 2/2 SIADH  -c/w salt tabs, monitor Na    #Hematochezia  -1 episode on 4/27, no further episodes per pt  -trend CBC  -pt is a [employer]- no blood transfusions to be given  -if pt continues to have further episodes of bleeding, she will consider colonoscopy    d/w HS 5

## 2024-05-01 NOTE — PROVIDER CONTACT NOTE (OTHER) - BACKGROUND
Malignant neoplasm of pancreas
Malignant neoplasm of pancreas, PVD, HTN, Cholestasis
Malignant neoplasm of pancreas, PVD, HTN, Cholestasis
PVD, malignant neoplasm of pancreas, Cholestasis, Hyponatremia
pt admitted for malignant neoplasm of pancreas mass

## 2024-05-02 ENCOUNTER — TRANSCRIPTION ENCOUNTER (OUTPATIENT)
Age: 89
End: 2024-05-02

## 2024-05-02 VITALS
OXYGEN SATURATION: 100 % | TEMPERATURE: 97 F | DIASTOLIC BLOOD PRESSURE: 65 MMHG | HEART RATE: 69 BPM | SYSTOLIC BLOOD PRESSURE: 151 MMHG | RESPIRATION RATE: 18 BRPM

## 2024-05-02 PROCEDURE — 99239 HOSP IP/OBS DSCHRG MGMT >30: CPT | Mod: GC

## 2024-05-02 RX ORDER — CALAMINE AND ZINC OXIDE AND PHENOL 160; 10 MG/ML; MG/ML
1 LOTION TOPICAL
Qty: 3 | Refills: 0
Start: 2024-05-02 | End: 2024-05-31

## 2024-05-02 RX ORDER — CHOLESTYRAMINE 4 G/9G
4 POWDER, FOR SUSPENSION ORAL
Qty: 2 | Refills: 3
Start: 2024-05-02 | End: 2025-04-26

## 2024-05-02 RX ORDER — PREGABALIN 225 MG/1
1 CAPSULE ORAL
Qty: 90 | Refills: 0
Start: 2024-05-02 | End: 2024-07-30

## 2024-05-02 RX ORDER — THIAMINE MONONITRATE (VIT B1) 100 MG
1 TABLET ORAL
Qty: 90 | Refills: 0
Start: 2024-05-02 | End: 2024-07-30

## 2024-05-02 RX ORDER — FOLIC ACID 0.8 MG
1 TABLET ORAL
Qty: 90 | Refills: 0
Start: 2024-05-02 | End: 2024-07-30

## 2024-05-02 RX ORDER — MIRTAZAPINE 45 MG/1
1 TABLET, ORALLY DISINTEGRATING ORAL
Qty: 30 | Refills: 0
Start: 2024-05-02 | End: 2024-05-31

## 2024-05-02 RX ORDER — LANOLIN ALCOHOL/MO/W.PET/CERES
1 CREAM (GRAM) TOPICAL
Qty: 90 | Refills: 0
Start: 2024-05-02 | End: 2024-07-30

## 2024-05-02 RX ADMIN — CHOLESTYRAMINE 4 GRAM(S): 4 POWDER, FOR SUSPENSION ORAL at 08:49

## 2024-05-02 RX ADMIN — Medication 100 MILLIGRAM(S): at 11:27

## 2024-05-02 RX ADMIN — Medication 1 MILLIGRAM(S): at 11:27

## 2024-05-02 RX ADMIN — PREGABALIN 1000 MICROGRAM(S): 225 CAPSULE ORAL at 11:27

## 2024-05-02 RX ADMIN — CHLORHEXIDINE GLUCONATE 1 APPLICATION(S): 213 SOLUTION TOPICAL at 05:24

## 2024-05-02 NOTE — PROGRESS NOTE ADULT - PROBLEM SELECTOR PLAN 9
On Diovan and Natrilix at home  - cont with home valsartan

## 2024-05-02 NOTE — PROGRESS NOTE ADULT - PROBLEM SELECTOR PLAN 2
Patient with downtrending hgb, likely in the setting of chronic disease. Patient with last bright red BM on 4/27.  - No blood products; patient is [employer].  - GI following, recs appreciated  - Heme consulted, recs appreciated  - F/u iron studies, B12, folate, retic count - indicative of anemia of chronic disease  - Daily folate supplementation, no epo due to malignancy  - D/c lovenox, anti-HTNs  - Stool count

## 2024-05-02 NOTE — PROGRESS NOTE ADULT - PROBLEM SELECTOR PLAN 1
Presents with generalized malaise, weight loss, jaundice and itchiness. Found to have 1.4cm pancreatic mass on CT imaging with marked pancreatic and biliary ductal dilation, concern for adenocarcinoma. Also with mass-like nonspecific fullness of pancreatic head.  - Patient denies pulmonary symptoms regarding chest involvement  - Lipase elevated to 371, however CT with nonspecific findings and patient without significant pain currently  - GOC initiated with patient and family, amenable to symptom-relief and possible biopsy, pt does not want treatment for cancer, amenable to hospice care  - GI consulted, recs appreciated  - Unable to place biliary stent on 4/29, patient declining percutaneous biliary drain  - Pending initiation of home hospice services.

## 2024-05-02 NOTE — MEDICAL STUDENT PROGRESS NOTE(EDUCATION) - SUBJECTIVE AND OBJECTIVE BOX
Progress Note  04-23-24 (1d)  89F with PMH of HTN, HLD, [place], and peripheral vascular disease presenting to the ED for elevated LFTs over the past 3 weeks with symptoms of painless jaundice and itchiness, found to have elevated Bilirubin and Alk Phos with CT findings of CBD dilatation iso pancreatic mass, admitted for further work-up and management    Subjective / Interval Events :  - No acute events overnight.  - Pt seen and examined at bedside.   - Patient states she feels ok but continues to feel tired and had difficulty sleeping last night. She noted some left hand pain this morning but states it went away after she tried "warming up" her hand. She denies any CP/SOB/N/V/D.  Additional ROS (if any): see above, otherwise negative     MEDICATIONS  (STANDING):  cefTRIAXone   IVPB 1000 milliGRAM(s) IV Intermittent every 24 hours  cholestyramine Powder (Sugar-Free) 4 Gram(s) Oral two times a day  enoxaparin Injectable 40 milliGRAM(s) SubCutaneous every 24 hours  sodium chloride 0.9%. 500 milliLiter(s) (75 mL/Hr) IV Continuous <Continuous>  valsartan 160 milliGRAM(s) Oral daily    MEDICATIONS  (PRN):  melatonin 3 milliGRAM(s) Oral at bedtime PRN Insomnia    PHYSICAL EXAM:  Vital Signs Last 24 Hrs  T(C): 36.7 (24 Apr 2024 05:30), Max: 36.9 (23 Apr 2024 13:50)  T(F): 98 (24 Apr 2024 05:30), Max: 98.5 (23 Apr 2024 13:50)  HR: 61 (24 Apr 2024 05:30) (59 - 88)  BP: 126/69 (24 Apr 2024 05:30) (126/69 - 171/85)  RR: 16 (24 Apr 2024 05:30) (16 - 20)  SpO2: 100% (24 Apr 2024 05:30) (97% - 100%)    Parameters below as of 24 Apr 2024 05:30  Patient On (Oxygen Delivery Method): room air        General: NAD, tired appearing  HEENT: EOMi, + scleral icterus  CV: RRR, normal S1 and S2, +systolic murmur  Lungs: normal respiratory effort, CTAB  Abd: normoactive BS, soft, nontender, nondistended  Ext: no edema, warm, well perfused  Pysch: AAOx3, appropriate affect   Neuro: grossly non-focal, moving all extremities spontaneously   Skin: no rashes or lesions       LABS:                          10.4   6.20  )-----------( 297      ( 24 Apr 2024 06:06 )             28.1       WBC Trend: 6.20<--, 5.55<--  Hb Trend: 10.4<--, 10.2<--    04-24    127<L>  |  93<L>  |  9   ----------------------------<  110<H>  3.4<L>   |  20<L>  |  0.35<L>    Ca    8.9      24 Apr 2024 06:06  Phos  2.8     04-24  Mg     1.70     04-24    TPro  7.3  /  Alb  3.3  /  TBili  19.3<H>  /  DBili  x   /  AST  53<H>  /  ALT  29  /  AlkPhos  338<H>  04-24    PT/INR - ( 24 Apr 2024 06:06 )   PT: 14.9 sec;   INR: 1.33 ratio         PTT - ( 24 Apr 2024 06:06 )  PTT:34.5 sec      Urinalysis Basic - ( 24 Apr 2024 06:06 )    Color: x / Appearance: x / SG: x / pH: x  Gluc: 110 mg/dL / Ketone: x  / Bili: x / Urobili: x   Blood: x / Protein: x / Nitrite: x   Leuk Esterase: x / RBC: x / WBC x   Sq Epi: x / Non Sq Epi: x / Bacteria: x        Blood Gas Venous Comprehensive Result: Performed In Lab (04-23-24 @ 14:16)      RADIOLOGY & ADDITIONAL TESTS: Reviewed  - No new images
Progress Note  04-23-24 (3d)  Patient is a 89y old  Female who presents with a chief complaint of Pancreatic mass, abnormal lab-work (26 Apr 2024 07:26)    Subjective / Interval Events :  - No acute events overnight.  - Pt seen and examined at bedside.   - Patient states she is generally well but endorses severe pruritis.    Additional ROS (if any): see above, otherwise negative     MEDICATIONS  (STANDING):  calamine/zinc oxide Lotion 1 Application(s) Topical daily  chlorhexidine 2% Cloths 1 Application(s) Topical <User Schedule>  cholestyramine Powder (Sugar-Free) 4 Gram(s) Oral two times a day  enoxaparin Injectable 40 milliGRAM(s) SubCutaneous every 24 hours  sodium chloride 0.9%. 1000 milliLiter(s) (100 mL/Hr) IV Continuous <Continuous>  sodium chloride 0.9%. 500 milliLiter(s) (75 mL/Hr) IV Continuous <Continuous>  valsartan 160 milliGRAM(s) Oral daily    MEDICATIONS  (PRN):  melatonin 3 milliGRAM(s) Oral at bedtime PRN Insomnia    I&O's Summary    25 Apr 2024 07:01  -  26 Apr 2024 07:00  --------------------------------------------------------  IN: 1080 mL / OUT: 1400 mL / NET: -320 mL        PHYSICAL EXAM:  Vital Signs Last 24 Hrs  T(C): 36.5 (26 Apr 2024 05:35), Max: 36.7 (25 Apr 2024 22:49)  T(F): 97.7 (26 Apr 2024 05:35), Max: 98.1 (25 Apr 2024 22:49)  HR: 63 (26 Apr 2024 05:35) (63 - 77)  BP: 141/74 (26 Apr 2024 05:35) (141/74 - 159/65)  RR: 18 (26 Apr 2024 05:35) (18 - 18)  SpO2: 100% (26 Apr 2024 05:35) (97% - 100%)    Parameters below as of 26 Apr 2024 05:35  Patient On (Oxygen Delivery Method): room air    General: NAD, non-toxic appearing   HEENT: EOMi, + scleral icterus  CV: RRR, normal S1 and S2, systolic murmur  Lungs: normal respiratory effort, CTAB  Abd: soft, nontender, nondistended  Ext: no edema, warm, well perfused  Pysch: AAOx3, appropriate affect   Neuro: grossly non-focal, moving all extremities spontaneously   Skin: no rashes or lesions       LABS:                          8.8    5.82  )-----------( 265      ( 26 Apr 2024 03:00 )             27.0       WBC Trend: 5.82<--, 5.81<--, 6.20<--  Hb Trend: 8.8<--, 10.4<--, 10.4<--, 10.2<--    04-26    123<L>  |  93<L>  |  9   ----------------------------<  126<H>  3.5   |  18<L>  |  0.35<L>    Ca    8.9      26 Apr 2024 03:00  Phos  2.8     04-26  Mg     1.60     04-26    TPro  7.2  /  Alb  3.0<L>  /  TBili  18.9<H>  /  DBili  15.6<H>  /  AST  57<H>  /  ALT  33  /  AlkPhos  337<H>  04-26    PT/INR - ( 26 Apr 2024 03:00 )   PT: 16.0 sec;   INR: 1.43 ratio         PTT - ( 26 Apr 2024 03:00 )  PTT:39.0 sec      Urinalysis Basic - ( 26 Apr 2024 03:00 )    Color: x / Appearance: x / SG: x / pH: x  Gluc: 126 mg/dL / Ketone: x  / Bili: x / Urobili: x   Blood: x / Protein: x / Nitrite: x   Leuk Esterase: x / RBC: x / WBC x   Sq Epi: x / Non Sq Epi: x / Bacteria: x        Culture - Urine (collected 24 Apr 2024 06:00)  Source: Clean Catch Clean Catch (Midstream)  Preliminary Report (25 Apr 2024 22:52):    >100,000 CFU/ml Escherichia coli          RADIOLOGY & ADDITIONAL TESTS: Reviewed  < from: TTE W or WO Ultrasound Enhancing Agent (04.25.24 @ 10:28) >  CONCLUSIONS:      1. Left ventricular systolic function is normal with an ejection fraction of 63 % by Meeks's method of disks.   2. Normal left ventricular diastolic function.   3. Normal right ventricular cavity size, with normal wall thickness, and normal systolic function.   4. Mild to moderate mitral regurgitation.   5. Normal left and right atrial size.   6. Mild left ventricular hypertrophy.   7. There is moderate calcification of the aortic valve leaflets. Moderate to severe aortic stenosis.    < end of copied text >  
Progress Note  04-23-24 (2d)  89F with PMH of HTN, HLD, [place], and peripheral vascular disease presenting to the ED for elevated LFTs over the past 3 weeks with symptoms of painless jaundice and itchiness, found to have elevated Bilirubin and Alk Phos with CT findings of CBD dilatation iso pancreatic mass, admitted for further work-up and management    Subjective / Interval Events :  - No acute events overnight.  - Pt seen and examined at bedside.   - Pt states she is generally comfortable but has right hip pain following CT scan  Additional ROS (if any): see above, otherwise negative     MEDICATIONS  (STANDING):  calamine/zinc oxide Lotion 1 Application(s) Topical daily  cefTRIAXone   IVPB 1000 milliGRAM(s) IV Intermittent every 24 hours  chlorhexidine 2% Cloths 1 Application(s) Topical <User Schedule>  cholestyramine Powder (Sugar-Free) 4 Gram(s) Oral two times a day  enoxaparin Injectable 40 milliGRAM(s) SubCutaneous every 24 hours  sodium chloride 0.9%. 500 milliLiter(s) (75 mL/Hr) IV Continuous <Continuous>  valsartan 160 milliGRAM(s) Oral daily    MEDICATIONS  (PRN):  melatonin 3 milliGRAM(s) Oral at bedtime PRN Insomnia    I&O's Summary    24 Apr 2024 07:01  -  25 Apr 2024 07:00  --------------------------------------------------------  IN: 980 mL / OUT: 500 mL / NET: 480 mL        PHYSICAL EXAM:  Vital Signs Last 24 Hrs  T(C): 36.6 (25 Apr 2024 05:21), Max: 36.6 (25 Apr 2024 05:21)  T(F): 97.8 (25 Apr 2024 05:21), Max: 97.8 (25 Apr 2024 05:21)  HR: 60 (25 Apr 2024 05:21) (60 - 63)  BP: 118/60 (25 Apr 2024 05:21) (118/60 - 143/63)  BP(mean): --  RR: 18 (25 Apr 2024 05:21) (18 - 18)  SpO2: 100% (25 Apr 2024 05:21) (100% - 100%)    Parameters below as of 25 Apr 2024 05:21  Patient On (Oxygen Delivery Method): room air        General: NAD, non-toxic appearing   HEENT: EOMi, no scleral icterus  CV: RRR, normal S1 and S2  Lungs: normal respiratory effort, CTAB  Abd: soft, nontender, nondistended  Ext: no edema, warm, well perfused  Pysch: AAOx3, appropriate affect   Neuro: grossly non-focal, moving all extremities spontaneously   Skin: no rashes or lesions       LABS:                          10.4   6.20  )-----------( 297      ( 24 Apr 2024 06:06 )             28.1       WBC Trend: 6.20<--, 5.55<--  Hb Trend: 10.4<--, 10.2<--    04-25    129<L>  |  96<L>  |  9   ----------------------------<  114<H>  4.4   |  20<L>  |  0.39<L>    Ca    9.1      25 Apr 2024 05:40  Phos  2.7     04-25  Mg     1.70     04-25    TPro  7.4  /  Alb  3.1<L>  /  TBili  19.0<H>  /  DBili  15.8<H>  /  AST  55<H>  /  ALT  29  /  AlkPhos  345<H>  04-25    PT/INR - ( 25 Apr 2024 05:40 )   PT: 16.0 sec;   INR: 1.44 ratio         PTT - ( 25 Apr 2024 05:40 )  PTT:36.8 sec      Urinalysis Basic - ( 25 Apr 2024 05:40 )    Color: x / Appearance: x / SG: x / pH: x  Gluc: 114 mg/dL / Ketone: x  / Bili: x / Urobili: x   Blood: x / Protein: x / Nitrite: x   Leuk Esterase: x / RBC: x / WBC x   Sq Epi: x / Non Sq Epi: x / Bacteria: x            RADIOLOGY & ADDITIONAL TESTS: Reviewed  - No new images
Progress Note  04-23-24 (8d)  Patient is a 89y old  Female who presents with a chief complaint of Pancreatic mass, abnormal lab-work (01 May 2024 07:17)    Subjective / Interval Events :  - No acute events overnight.  - Pt seen and examined at bedside.   - Pt states she is feeling ok this morning and that the pruritis has improved. She states she would likely be amenable to the percutaneous biliary drain but states she no longer wants to be in the hospital. She states she would like to go home and return for the procedure. She denies any chest pain/sob, N/V/D, or bloody bowel movements. She states she is tolerating PO intake well.     Additional ROS (if any): see above, otherwise negative     MEDICATIONS  (STANDING):  calamine/zinc oxide Lotion 1 Application(s) Topical daily  chlorhexidine 2% Cloths 1 Application(s) Topical <User Schedule>  cholestyramine Powder (Sugar-Free) 4 Gram(s) Oral two times a day  cyanocobalamin 1000 MICROGram(s) Oral daily  folic acid 1 milliGRAM(s) Oral daily  mirtazapine 7.5 milliGRAM(s) Oral at bedtime  sodium chloride 2 Gram(s) Oral three times a day  thiamine 100 milliGRAM(s) Oral daily    MEDICATIONS  (PRN):  melatonin 3 milliGRAM(s) Oral at bedtime PRN Insomnia    PHYSICAL EXAM:  Vital Signs Last 24 Hrs  T(C): 36.8 (01 May 2024 05:44), Max: 36.8 (30 Apr 2024 13:08)  T(F): 98.3 (01 May 2024 05:44), Max: 98.3 (01 May 2024 05:44)  HR: 75 (01 May 2024 05:44) (70 - 80)  BP: 150/70 (01 May 2024 05:44) (139/62 - 150/70)  RR: 17 (01 May 2024 05:44) (17 - 18)  SpO2: 100% (01 May 2024 05:44) (100% - 100%)    Parameters below as of 01 May 2024 05:44  Patient On (Oxygen Delivery Method): room air        General: NAD, non-toxic appearing   HEENT: EOMi, + scleral icterus  CV: RRR, normal S1 and S2, +systolic murmur   Lungs: normal respiratory effort, CTAB  Abd: soft, nontender, nondistended  Ext: no edema, warm, well perfused  Pysch: AAOx3, appropriate affect   Neuro: grossly non-focal, moving all extremities spontaneously   Skin: no rashes or lesions       LABS:    WBC Trend: 6.03<--, 6.55<--, 5.56<--  Hb Trend: 9.0<--, 8.9<--, 10.0<--, 10.3<--, 8.8<--    04-29    132<L>  |  100  |  8   ----------------------------<  126<H>  3.5   |  20<L>  |  0.34<L>    Ca    8.8      29 Apr 2024 10:52        Urinalysis Basic - ( 29 Apr 2024 10:52 )    Color: x / Appearance: x / SG: x / pH: x  Gluc: 126 mg/dL / Ketone: x  / Bili: x / Urobili: x   Blood: x / Protein: x / Nitrite: x   Leuk Esterase: x / RBC: x / WBC x   Sq Epi: x / Non Sq Epi: x / Bacteria: x            RADIOLOGY & ADDITIONAL TESTS: Reviewed  - No new images
Progress Note  04-23-24 (9d)  89F with PMH of HTN, HLD, [place], and peripheral vascular disease presenting to the ED for elevated LFTs over the past 3 weeks with symptoms of painless jaundice and itchiness, found to have elevated Bilirubin and Alk Phos with CT findings of CBD dilatation iso pancreatic mass. S/o unsuccessful biliary stent placement, deferring percutaneous biliary drain, pending initiation of home hospice.    Subjective / Interval Events :  - No acute events overnight.  - Pt seen and examined at bedside.   - Pt status unchanged. Wants to go home     Additional ROS (if any): see above, otherwise negative     MEDICATIONS  (STANDING):  calamine/zinc oxide Lotion 1 Application(s) Topical daily  chlorhexidine 2% Cloths 1 Application(s) Topical <User Schedule>  cholestyramine Powder (Sugar-Free) 4 Gram(s) Oral two times a day  cyanocobalamin 1000 MICROGram(s) Oral daily  folic acid 1 milliGRAM(s) Oral daily  mirtazapine 7.5 milliGRAM(s) Oral at bedtime  thiamine 100 milliGRAM(s) Oral daily    MEDICATIONS  (PRN):  melatonin 3 milliGRAM(s) Oral at bedtime PRN Insomnia    I&O's Summary    01 May 2024 07:01  -  02 May 2024 07:00  --------------------------------------------------------  IN: 1000 mL / OUT: 1200 mL / NET: -200 mL    PHYSICAL EXAM:  Vital Signs Last 24 Hrs  T(C): 36.7 (02 May 2024 05:21), Max: 37 (01 May 2024 21:57)  T(F): 98.1 (02 May 2024 05:21), Max: 98.6 (01 May 2024 21:57)  HR: 71 (02 May 2024 05:21) (67 - 72)  BP: 151/71 (02 May 2024 05:21) (118/60 - 151/71)  RR: 18 (02 May 2024 05:21) (17 - 18)  SpO2: 100% (02 May 2024 05:21) (96% - 100%)    Parameters below as of 02 May 2024 05:21  Patient On (Oxygen Delivery Method): room air    General: NAD, non-toxic appearing   HEENT: EOMi, + scleral icterus  CV: RRR, normal S1 and S2  Lungs: normal respiratory effort, CTAB  Abd: soft, nontender, nondistended  Ext: no edema, warm, well perfused  Pysch: AAOx3, appropriate affect   Neuro: grossly non-focal, moving all extremities spontaneously   Skin: no rashes or lesions       LABS:  No new labs    RADIOLOGY & ADDITIONAL TESTS: Reviewed  - No new images

## 2024-05-02 NOTE — DISCHARGE NOTE NURSING/CASE MANAGEMENT/SOCIAL WORK - NSDCFUADDAPPT_GEN_ALL_CORE_FT
APPTS ARE READY TO BE MADE: [X] YES    Best Family or Patient Contact (if needed):  Additional Information about above appointments (if needed):  1: Please follow up with [facility] after discharge ([phone])  2: Please schedule follow-up with Interventional Radiology when needed  3:     Other comments or requests:

## 2024-05-02 NOTE — PROGRESS NOTE ADULT - PROBLEM SELECTOR PLAN 4
Likely iso cholestasis given above. No rashes or lesions visualized.  - Can cont with antihistamine to assess if improvement  - Trial Cholestyramine BID  - Limit benadryl due to sedating effects  - Calamine lotion  - Mirtazepine 7.5 qD

## 2024-05-02 NOTE — MEDICAL STUDENT PROGRESS NOTE(EDUCATION) - ASSESSMENT
89F with PMH of HTN, HLD, [place], and peripheral vascular disease presenting to the ED for elevated LFTs over the past 3 weeks with symptoms of painless jaundice and itchiness, found to have elevated Bilirubin and Alk Phos with CT findings of CBD dilatation iso pancreatic mass. S/o unsuccessful biliary stent placement, deferring percutaneous biliary drain, pending initiation of home hospice.       Problem/Plan - 1:  ·  Problem: Pancreatic mass.   ·  Plan: Presents with generalized malaise, weight loss, jaundice and itchiness. Found to have 1.4cm pancreatic mass on CT imaging with marked pancreatic and biliary ductal dilation, concern for adenocarcinoma. Also with mass-like nonspecific fullness of pancreatic head.  - Patient denies pulmonary symptoms regarding chest involvement  - Lipase elevated to 371, however CT with nonspecific findings and patient without significant pain currently  - GOC initiated with patient and family, amenable to symptom-relief and possible biopsy, pt does not want treatment for cancer, amenable to hospice care  - GI consulted, recs appreciated  - Unable to place biliary stent on 4/29, patient declining percutaneous biliary drain  - Pending initiation of home hospice services.     Problem/Plan - 2:  ·  Problem: Anemia.   ·  Plan: Patient with downtrending hgb, likely in the setting of chronic disease. Patient with last bright red BM on 4/27.  - No blood products; patient is [employer].  - GI following, recs appreciated  - Heme consulted, recs appreciated  - F/u iron studies, B12, folate, retic count - indicative of anemia of chronic disease  - Daily folate supplementation, no epo due to malignancy  - D/c lovenox, anti-HTNs  - Stool count.     Problem/Plan - 3:  ·  Problem: Cholestasis.   ·  Plan: Presents with pruritis, jaundice, and elevated Bilirubin, Alk Phos, and AST, likely in setting of obstruction/compression by pancreatic mass  - CT imaging findings as above  - R factor 0.3, cholestatic  - No leukocytosis, no abdominal pain nor tenderness, no fevers/chills lately, no diarrhea nor signs/symptoms of possible colitis mentioned on CT  - sequelae of chronic cholecystitis likely iso above CT findings  - hepatitis panel negative   - S/p unsuccessful biliary stent placement (4/29) - patient declining percutaneous biliary drain.     Problem/Plan - 4:  ·  Problem: Pruritus.   ·  Plan: Likely iso cholestasis given above. No rashes or lesions visualized.  - Can cont with antihistamine to assess if improvement  - Trial Cholestyramine BID  - Limit benadryl due to sedating effects  - Calamine lotion  - Mirtazepine 7.5 qD.     Problem/Plan - 5:  ·  Problem: Hyponatremia.   ·  Plan: Patient hyponatremic to 129 on admission, as low as 123 on 4/26. Likely in the setting of malignancy, decreased PO intake, vs SIADH.  - Serum osm: 266, urine osm: 637, urine Na: 147.  - TSH wnl, f/u AM cortisol  - Optimize glucose control  - Fluid restriction to 1L, d/c DASH diet  - Caution not to overcorrect >6-8 within 24h period.  - Salt tabs d/c     Problem/Plan - 6:  ·  Problem: Hypokalemia.   ·  Plan: K 3.2 on admission as well as mild hyponatremia on admission, likely iso poor PO intake given poor appetite and weight loss  - cont to trend electrolytes and replete PRN.     Problem/Plan - 7:  ·  Problem: Aortic stenosis.   ·  Plan: CT findings extensive aortic valve leaflet calcifications, systolic murmur appreciated on exam  - denies any significant change or decrease in functional status but baseline functional status is low given her age  - no significant swelling nor SOB at rest  - f/u TTE - mod to severe AS, EF of 63%  - Cardiology consult for cardiac clearance, recs appreciated.     Problem/Plan - 8:  ·  Problem: UTI (urinary tract infection).   ·  Plan: UA with WBC and many bacteria. Endorses burning with urination. RESOLVED  - CT abd/pelvis with findings of mild thickening of the bladder base with prominence of the proximal urethra  - Ceftriaxone for simple cystitis.  - Ctx course complete     Problem/Plan - 9:  ·  Problem: Hypertension.   ·  Plan: On Diovan and Natrilix at home  - cont with home valsartan.     Problem/Plan - 10:  ·  Problem: Need for prophylactic measure.   ·  Plan; DVT ppx: SCDs  Diet: Regular, no coffee/fish  Ethics: DNR/intubate per discussion overnight (unclear if time of night affected discussion). Was previously DNR/DNI per family members.

## 2024-05-02 NOTE — DISCHARGE NOTE NURSING/CASE MANAGEMENT/SOCIAL WORK - PATIENT PORTAL LINK FT
You can access the FollowMyHealth Patient Portal offered by [facility] by registering at the following website: [email or web address]. By joining [employer]’s FollowMyHealth portal, you will also be able to view your health information using other applications (apps) compatible with our system.

## 2024-05-02 NOTE — PROGRESS NOTE ADULT - ASSESSMENT
89F with PMH of HTN, HLD, [place], and peripheral vascular disease presenting to the ED for elevated LFTs over the past 3 weeks with symptoms of painless jaundice and itchiness, found to have elevated Bilirubin and Alk Phos with CT findings of CBD dilatation iso pancreatic mass. S/o unsuccessful biliary stent placement, deferring percutaneous biliary drain, pending initiation of home hospice.

## 2024-05-02 NOTE — PROGRESS NOTE ADULT - SUBJECTIVE AND OBJECTIVE BOX
Cardiovascular Disease Progress Note  DATE OF SERVICE: 05-02-24 @ 08:08    Overnight events: No acute events overnight.    The patient is in no distress.   She denies chest pain or SOB.   Otherwise review of systems negative    Objective Findings:  T(C): 36.7 (05-02-24 @ 05:21), Max: 37 (05-01-24 @ 21:57)  HR: 71 (05-02-24 @ 05:21) (67 - 72)  BP: 151/71 (05-02-24 @ 05:21) (118/60 - 151/71)  RR: 18 (05-02-24 @ 05:21) (17 - 18)  SpO2: 100% (05-02-24 @ 05:21) (96% - 100%)  Wt(kg): --  Daily     Daily       Physical Exam:  Gen: NAD; Patient resting comfortably  HEENT: EOMI, Normocephalic/ atraumatic  CV: RRR, normal S1 + S2, no m/r/g  Lungs:  Normal respiratory effort; clear to auscultation bilaterally  Abd: soft, non-tender; bowel sounds present  Ext: No edema; warm and well perfused    Telemetry: n/a    Laboratory Data:    No recent labs          Inpatient Medications:  MEDICATIONS  (STANDING):  calamine/zinc oxide Lotion 1 Application(s) Topical daily  chlorhexidine 2% Cloths 1 Application(s) Topical <User Schedule>  cholestyramine Powder (Sugar-Free) 4 Gram(s) Oral two times a day  cyanocobalamin 1000 MICROGram(s) Oral daily  folic acid 1 milliGRAM(s) Oral daily  mirtazapine 7.5 milliGRAM(s) Oral at bedtime  thiamine 100 milliGRAM(s) Oral daily      Assessment: 89 year old woman with HTN, HLD, aortic stenosis, and peripheral vascular disease presents with painless jaundice     Plan of Care:      #Aortic stenosis-  Conservative management as per patient and family wishes.     #HTN  - BP acceptable on current regimen    #HLD  - Statin stopped  in setting of transaminitis       #ACP (advance care planning)-  CPT 49927  Advanced care planning was discussed with the patient.  Advance care planning forms were discussed.   Plan for conservative cardiac management as per her wishes.  30 additional minutes spent addressing advance care plans.    Over 55 minutes spent on total encounter; more than 50% of the visit was spent counseling and/or coordinating care by the attending physician.      Jamie Bella MD [facility]  Cardiovascular Disease  [phone]

## 2024-05-02 NOTE — PROGRESS NOTE ADULT - PROVIDER SPECIALTY LIST ADULT
Cardiology
Gastroenterology
Anesthesia
Cardiology
Internal Medicine
Cardiology
Cardiology
Internal Medicine
Palliative Care
Internal Medicine
Palliative Care
Internal Medicine
yes

## 2024-05-02 NOTE — PROGRESS NOTE ADULT - PROBLEM SELECTOR PROBLEM 1
Pancreatic mass

## 2024-05-02 NOTE — PROGRESS NOTE ADULT - PROBLEM SELECTOR PLAN 3
Presents with pruritis, jaundice, and elevated Bilirubin, Alk Phos, and AST, likely in setting of obstruction/compression by pancreatic mass  - CT imaging findings as above  - R factor 0.3, cholestatic  - No leukocytosis, no abdominal pain nor tenderness, no fevers/chills lately, no diarrhea nor signs/symptoms of possible colitis mentioned on CT  - sequelae of chronic cholecystitis likely iso above CT findings  - hepatitis panel negative   - S/p unsuccessful biliary stent placement (4/29) - patient declining percutaneous biliary drain.

## 2024-05-02 NOTE — PROGRESS NOTE ADULT - REASON FOR ADMISSION
Pancreatic mass, abnormal lab-work

## 2024-05-02 NOTE — PROGRESS NOTE ADULT - SUBJECTIVE AND OBJECTIVE BOX
Patient is a 89y old  Female who presents with a chief complaint of Pancreatic mass, abnormal lab-work    SUBJECTIVE / OVERNIGHT EVENTS: Patient seen and examined at bedside. No overnight events.    MEDICATIONS  (STANDING):  calamine/zinc oxide Lotion 1 Application(s) Topical daily  chlorhexidine 2% Cloths 1 Application(s) Topical <User Schedule>  cholestyramine Powder (Sugar-Free) 4 Gram(s) Oral two times a day  cyanocobalamin 1000 MICROGram(s) Oral daily  folic acid 1 milliGRAM(s) Oral daily  mirtazapine 7.5 milliGRAM(s) Oral at bedtime  thiamine 100 milliGRAM(s) Oral daily    MEDICATIONS  (PRN):  melatonin 3 milliGRAM(s) Oral at bedtime PRN Insomnia    Vital Signs Last 24 Hrs  T(C): 36.7 (02 May 2024 05:21), Max: 37 (01 May 2024 21:57)  T(F): 98.1 (02 May 2024 05:21), Max: 98.6 (01 May 2024 21:57)  HR: 71 (02 May 2024 05:21) (67 - 72)  BP: 151/71 (02 May 2024 05:21) (118/60 - 151/71)  BP(mean): --  RR: 18 (02 May 2024 05:21) (17 - 18)  SpO2: 100% (02 May 2024 05:21) (96% - 100%)    Parameters below as of 02 May 2024 05:21  Patient On (Oxygen Delivery Method): room air    CAPILLARY BLOOD GLUCOSE    I&O's Summary    01 May 2024 07:01  -  02 May 2024 07:00  --------------------------------------------------------  IN: 1000 mL / OUT: 800 mL / NET: 200 mL    PHYSICAL EXAM:  GENERAL: NAD, well-developed  HEAD:  Atraumatic, Normocephalic  EYES: +Scleral icterus. EOMI, PERRLA,  NECK: Supple, No JVD  CHEST/LUNG: Clear to auscultation bilaterally; No wheeze  HEART: Regular rate and rhythm; No murmurs, rubs, or gallops  ABDOMEN: Soft, Nontender, Nondistended; Bowel sounds present  EXTREMITIES:  2+ Peripheral Pulses, No clubbing, cyanosis, or edema  PSYCH: AAOx3  NEUROLOGY: non-focal  SKIN: No rashes or lesions    LABS:    RADIOLOGY & ADDITIONAL TESTS:    Imaging Personally Reviewed:    Consultant(s) Notes Reviewed:      Care Discussed with Consultants/Other Providers:    Josep Patel MD, Internal Medicine Resident

## 2024-05-28 NOTE — PROGRESS NOTE ADULT - PROBLEM SELECTOR PLAN 7
Appointment and order(s) canceled   CT findings extensive aortic valve leaflet calcifications, systolic murmur appreciated on exam  - denies any significant change or decrease in functional status but baseline functional status is low given her age  - no significant swelling nor SOB at rest  - f/u TTE - mod to severe AS, EF of 63%  - Cardiology consult for cardiac clearance, recs appreciated.

## 2025-05-01 NOTE — PHYSICAL THERAPY INITIAL EVALUATION ADULT - REHAB POTENTIAL, PT EVAL
Progress Note( Dr. Rosenthal)  4/30/2025  Subjective:   Admit Date: 4/24/2025  PCP: Raji Her MD    Admitted For :  Altered mental status and confusion     Consulted For: Better control of blood glucose     Interval History: Feels somewhat better this morning.  Has had blood glucose today  Better    Denies any chest pains,   Mild  SOB .   Denies nausea or vomiting.  Eating better this morning  No new bowel or bladder symptoms.     No intake or output data in the 24 hours ending 04/30/25 2258      DATA    CBC:   Recent Labs     04/28/25 0239 04/29/25 0258 04/30/25 0245   WBC 5.9 3.8* 3.3*   HGB 7.2* 7.6* 7.4*    CMP:  Recent Labs     04/28/25 0239 04/29/25 0258 04/30/25 0245    137 135*   K 4.2 4.3 4.3    109 109   CO2 20* 19* 18*   BUN 26* 24* 24*   CREATININE 2.1* 2.0* 1.7*   CALCIUM 7.8* 8.6 8.6     Lipids:   Lab Results   Component Value Date/Time    CHOL 114 04/21/2025 08:56 AM    HDL 67 04/21/2025 08:56 AM    TRIG 60 04/21/2025 08:56 AM     Glucose:  Recent Labs     04/29/25 1956 04/30/25  0630 04/30/25  1050   POCGLU 267* 87 176*     [id number]:  Lab Results   Component Value Date/Time    LABA1C 5.4 04/21/2025 08:57 AM     High Sensitivity TSH:   Lab Results   Component Value Date/Time    TSHHS 1.350 08/22/2024 01:47 PM     Free T3: No results found for: \"FT3\"  Free T4:  Lab Results   Component Value Date/Time    T4FREE 1.9 04/21/2025 08:56 AM       CT CHEST ABDOMEN PELVIS WO CONTRAST Additional Contrast? None   Final Result      XR CHEST PORTABLE   Final Result           Scheduled Medicines   Medications:        Infusions:           Objective:   Vitals: BP (!) 130/50   Pulse 88   Temp 99.6 °F (37.6 °C) (Oral)   Resp 17   Ht 1.613 m (5' 3.5\")   Wt 62.4 kg (137 lb 9.1 oz)   SpO2 95%   BMI 23.99 kg/m²   General appearance: alert and cooperative with exam  Neck: no JVD or bruit  Thyroid : Normal lobes   Lungs: Has Vesicular Breath sounds.  Wheezing and rales but mostly  via  Heart:  regular rate and rhythm  Abdomen: soft, non-tender; bowel sounds normal; no masses,  no organomegaly  Musculoskeletal: Normal  Extremities: extremities normal, , no edema  Neurologic:  Awake, alert, oriented to name, place and time.  Cranial nerves II-XII are grossly intact.  Motor weakness.  Sensory neuropathy and gait is abormal.  Unstable  Assessment:     Patient Active Problem List:     Acquired hypothyroidism     Depression     Diabetic neuropathy (HCC)     DM (diabetes mellitus) (HCC)     HTN (hypertension)     Hyperlipidemia     Osteopenia     Personal history of breast cancer     Allergic rhinitis     ANTONINA (acute kidney injury)     GI bleed     Cirrhosis of liver with ascites (HCC)     Secondary esophageal varices without bleeding (HCC)     Anemia     Syncope, near     Cirrhosis of liver without ascites (HCC)     Abdominal pain, epigastric     Hepatic encephalopathy (HCC)      Plan:     Reviewed POC blood glucose . Labs and X ray results   Reviewed Current Medicines   On meal/ Correction bolus Humalog/ Basal Lantus Insulin regime  Monitor Blood glucose frequently   Modified  the dose of Insulin/ other medicines as needed   Will follow     .     JO-ANN Rosenthal MD,    good, to achieve stated therapy goals

## (undated) DEVICE — ORGANIZER MIO MEDICAL DEVICE DISP

## (undated) DEVICE — INJ SYS RAP REFIL

## (undated) DEVICE — PACK IV START WITH CHG

## (undated) DEVICE — SOL IRR POUR NS 0.9% 500ML

## (undated) DEVICE — DVC AUTO CAP RX LOKG

## (undated) DEVICE — CONTAINER FORMALIN 10% 20ML

## (undated) DEVICE — TUBING SUCTION NONCONDUCTIVE 6MM X 12FT

## (undated) DEVICE — OMNIPAQUE 300  30ML

## (undated) DEVICE — NDL HYPO SAFE 22G X 1" (BLACK)

## (undated) DEVICE — OLYMPUS DISTAL COVER ENDOSCOPE

## (undated) DEVICE — DRSG BANDAID 0.75X3"

## (undated) DEVICE — DRSG CURITY GAUZE SPONGE 4 X 4" 12-PLY NON-STERILE

## (undated) DEVICE — BITE BLOCK ADULT 20 X 27MM (GREEN)

## (undated) DEVICE — SYR LUER LOK 10CC

## (undated) DEVICE — BASIN EMESIS 10IN GRADUATED MAUVE

## (undated) DEVICE — CATH IV SAFE BC 22G X 1" (BLUE)

## (undated) DEVICE — DENTURE CUP PINK

## (undated) DEVICE — GOWN LG

## (undated) DEVICE — DRSG 2X2

## (undated) DEVICE — UNDERPAD LINEN SAVER 17 X 24"

## (undated) DEVICE — SOL INJ NS 0.9% 500ML 1-PORT

## (undated) DEVICE — ELCTR ECG CONDUCTIVE ADHESIVE

## (undated) DEVICE — SALIVA EJECTOR (BLUE)

## (undated) DEVICE — SENSOR O2 FINGER ADULT

## (undated) DEVICE — SYR LUER LOK 3CC